# Patient Record
Sex: FEMALE | Race: WHITE | NOT HISPANIC OR LATINO | Employment: OTHER | URBAN - METROPOLITAN AREA
[De-identification: names, ages, dates, MRNs, and addresses within clinical notes are randomized per-mention and may not be internally consistent; named-entity substitution may affect disease eponyms.]

---

## 2017-01-05 ENCOUNTER — COMMUNICATION - HEALTHEAST (OUTPATIENT)
Dept: FAMILY MEDICINE | Facility: CLINIC | Age: 62
End: 2017-01-05

## 2017-01-23 ENCOUNTER — COMMUNICATION - HEALTHEAST (OUTPATIENT)
Dept: FAMILY MEDICINE | Facility: CLINIC | Age: 62
End: 2017-01-23

## 2017-01-23 DIAGNOSIS — F32.A DEPRESSION: ICD-10-CM

## 2017-03-01 ENCOUNTER — COMMUNICATION - HEALTHEAST (OUTPATIENT)
Dept: FAMILY MEDICINE | Facility: CLINIC | Age: 62
End: 2017-03-01

## 2017-03-01 DIAGNOSIS — F32.A DEPRESSION: ICD-10-CM

## 2017-03-13 ENCOUNTER — OFFICE VISIT - HEALTHEAST (OUTPATIENT)
Dept: FAMILY MEDICINE | Facility: CLINIC | Age: 62
End: 2017-03-13

## 2017-03-13 DIAGNOSIS — H02.59 LAXITY OF EYELID: ICD-10-CM

## 2017-03-13 DIAGNOSIS — E78.5 HYPERLIPIDEMIA: ICD-10-CM

## 2017-03-13 DIAGNOSIS — Z01.818 PREOP EXAMINATION: ICD-10-CM

## 2017-03-13 LAB
CHOLEST SERPL-MCNC: 293 MG/DL
FASTING STATUS PATIENT QL REPORTED: NO
HDLC SERPL-MCNC: 67 MG/DL
LDLC SERPL CALC-MCNC: 170 MG/DL
TRIGL SERPL-MCNC: 280 MG/DL

## 2017-03-13 ASSESSMENT — MIFFLIN-ST. JEOR: SCORE: 1174.11

## 2017-03-15 ENCOUNTER — COMMUNICATION - HEALTHEAST (OUTPATIENT)
Dept: FAMILY MEDICINE | Facility: CLINIC | Age: 62
End: 2017-03-15

## 2017-04-05 ENCOUNTER — COMMUNICATION - HEALTHEAST (OUTPATIENT)
Dept: FAMILY MEDICINE | Facility: CLINIC | Age: 62
End: 2017-04-05

## 2017-04-11 ENCOUNTER — COMMUNICATION - HEALTHEAST (OUTPATIENT)
Dept: FAMILY MEDICINE | Facility: CLINIC | Age: 62
End: 2017-04-11

## 2017-04-11 DIAGNOSIS — I10 ESSENTIAL HYPERTENSION, BENIGN: ICD-10-CM

## 2017-04-11 DIAGNOSIS — F32.A DEPRESSION: ICD-10-CM

## 2017-04-24 ENCOUNTER — OFFICE VISIT - HEALTHEAST (OUTPATIENT)
Dept: FAMILY MEDICINE | Facility: CLINIC | Age: 62
End: 2017-04-24

## 2017-04-24 DIAGNOSIS — G25.0 TREMOR, ESSENTIAL: ICD-10-CM

## 2017-04-24 DIAGNOSIS — E78.2 MIXED HYPERLIPIDEMIA: ICD-10-CM

## 2017-08-24 ENCOUNTER — HOSPITAL ENCOUNTER (OUTPATIENT)
Dept: MAMMOGRAPHY | Facility: HOSPITAL | Age: 62
Discharge: HOME | End: 2017-08-24
Attending: FAMILY MEDICINE

## 2017-08-24 DIAGNOSIS — Z12.31 VISIT FOR SCREENING MAMMOGRAM: ICD-10-CM

## 2017-09-12 ENCOUNTER — COMMUNICATION - HEALTHEAST (OUTPATIENT)
Dept: FAMILY MEDICINE | Facility: CLINIC | Age: 62
End: 2017-09-12

## 2017-09-12 DIAGNOSIS — I10 ESSENTIAL HYPERTENSION, BENIGN: ICD-10-CM

## 2017-09-29 ENCOUNTER — COMMUNICATION - HEALTHEAST (OUTPATIENT)
Dept: FAMILY MEDICINE | Facility: CLINIC | Age: 62
End: 2017-09-29

## 2017-09-29 DIAGNOSIS — F32.A DEPRESSION: ICD-10-CM

## 2017-10-02 ENCOUNTER — RECORDS - HEALTHEAST (OUTPATIENT)
Dept: ADMINISTRATIVE | Facility: OTHER | Age: 62
End: 2017-10-02

## 2017-10-09 ENCOUNTER — RECORDS - HEALTHEAST (OUTPATIENT)
Dept: ADMINISTRATIVE | Facility: OTHER | Age: 62
End: 2017-10-09

## 2017-11-09 ENCOUNTER — COMMUNICATION - HEALTHEAST (OUTPATIENT)
Dept: FAMILY MEDICINE | Facility: CLINIC | Age: 62
End: 2017-11-09

## 2017-11-09 DIAGNOSIS — K29.70 GASTRITIS: ICD-10-CM

## 2017-11-22 ENCOUNTER — COMMUNICATION - HEALTHEAST (OUTPATIENT)
Dept: FAMILY MEDICINE | Facility: CLINIC | Age: 62
End: 2017-11-22

## 2018-01-03 ENCOUNTER — OFFICE VISIT - HEALTHEAST (OUTPATIENT)
Dept: FAMILY MEDICINE | Facility: CLINIC | Age: 63
End: 2018-01-03

## 2018-01-03 DIAGNOSIS — I10 ESSENTIAL HYPERTENSION, BENIGN: ICD-10-CM

## 2018-01-03 DIAGNOSIS — E78.5 HYPERLIPIDEMIA: ICD-10-CM

## 2018-01-03 LAB
ALBUMIN SERPL-MCNC: 3.9 G/DL (ref 3.5–5)
ALP SERPL-CCNC: 89 U/L (ref 45–120)
ALT SERPL W P-5'-P-CCNC: 44 U/L (ref 0–45)
ANION GAP SERPL CALCULATED.3IONS-SCNC: 11 MMOL/L (ref 5–18)
AST SERPL W P-5'-P-CCNC: 36 U/L (ref 0–40)
BILIRUB SERPL-MCNC: 0.6 MG/DL (ref 0–1)
BUN SERPL-MCNC: 16 MG/DL (ref 8–22)
CALCIUM SERPL-MCNC: 9.4 MG/DL (ref 8.5–10.5)
CHLORIDE BLD-SCNC: 103 MMOL/L (ref 98–107)
CHOLEST SERPL-MCNC: 213 MG/DL
CO2 SERPL-SCNC: 25 MMOL/L (ref 22–31)
CREAT SERPL-MCNC: 0.71 MG/DL (ref 0.6–1.1)
FASTING STATUS PATIENT QL REPORTED: YES
GFR SERPL CREATININE-BSD FRML MDRD: >60 ML/MIN/1.73M2
GLUCOSE BLD-MCNC: 106 MG/DL (ref 70–125)
HDLC SERPL-MCNC: 82 MG/DL
LDLC SERPL CALC-MCNC: 102 MG/DL
POTASSIUM BLD-SCNC: 4.6 MMOL/L (ref 3.5–5)
PROT SERPL-MCNC: 6.7 G/DL (ref 6–8)
SODIUM SERPL-SCNC: 139 MMOL/L (ref 136–145)
TRIGL SERPL-MCNC: 146 MG/DL

## 2018-01-03 ASSESSMENT — MIFFLIN-ST. JEOR: SCORE: 1142.36

## 2018-01-09 ENCOUNTER — COMMUNICATION - HEALTHEAST (OUTPATIENT)
Dept: FAMILY MEDICINE | Facility: CLINIC | Age: 63
End: 2018-01-09

## 2018-02-28 ENCOUNTER — OFFICE VISIT - HEALTHEAST (OUTPATIENT)
Dept: FAMILY MEDICINE | Facility: CLINIC | Age: 63
End: 2018-02-28

## 2018-02-28 DIAGNOSIS — E78.2 MIXED HYPERLIPIDEMIA: ICD-10-CM

## 2018-02-28 DIAGNOSIS — Z01.818 PREOP EXAMINATION: ICD-10-CM

## 2018-02-28 DIAGNOSIS — I10 ESSENTIAL HYPERTENSION, BENIGN: ICD-10-CM

## 2018-02-28 DIAGNOSIS — E66.9 OBESITY: ICD-10-CM

## 2018-02-28 LAB
ANION GAP SERPL CALCULATED.3IONS-SCNC: 9 MMOL/L (ref 5–18)
ATRIAL RATE - MUSE: 53 BPM
BUN SERPL-MCNC: 17 MG/DL (ref 8–22)
CALCIUM SERPL-MCNC: 9.5 MG/DL (ref 8.5–10.5)
CHLORIDE BLD-SCNC: 103 MMOL/L (ref 98–107)
CO2 SERPL-SCNC: 27 MMOL/L (ref 22–31)
CREAT SERPL-MCNC: 0.7 MG/DL (ref 0.6–1.1)
DIASTOLIC BLOOD PRESSURE - MUSE: NORMAL MMHG
ERYTHROCYTE [DISTWIDTH] IN BLOOD BY AUTOMATED COUNT: 12.5 % (ref 11–14.5)
GFR SERPL CREATININE-BSD FRML MDRD: >60 ML/MIN/1.73M2
GLUCOSE BLD-MCNC: 97 MG/DL (ref 70–125)
HCT VFR BLD AUTO: 40 % (ref 35–47)
HGB BLD-MCNC: 13.4 G/DL (ref 12–16)
INTERPRETATION ECG - MUSE: NORMAL
MCH RBC QN AUTO: 29.6 PG (ref 27–34)
MCHC RBC AUTO-ENTMCNC: 33.5 G/DL (ref 32–36)
MCV RBC AUTO: 88 FL (ref 80–100)
P AXIS - MUSE: 46 DEGREES
PLATELET # BLD AUTO: 303 THOU/UL (ref 140–440)
PMV BLD AUTO: 7.3 FL (ref 7–10)
POTASSIUM BLD-SCNC: 4.2 MMOL/L (ref 3.5–5)
PR INTERVAL - MUSE: 186 MS
QRS DURATION - MUSE: 94 MS
QT - MUSE: 494 MS
QTC - MUSE: 463 MS
R AXIS - MUSE: 12 DEGREES
RBC # BLD AUTO: 4.53 MILL/UL (ref 3.8–5.4)
SODIUM SERPL-SCNC: 139 MMOL/L (ref 136–145)
SYSTOLIC BLOOD PRESSURE - MUSE: NORMAL MMHG
T AXIS - MUSE: 6 DEGREES
VENTRICULAR RATE- MUSE: 53 BPM
WBC: 6.1 THOU/UL (ref 4–11)

## 2018-02-28 ASSESSMENT — MIFFLIN-ST. JEOR: SCORE: 1137.82

## 2018-05-08 ENCOUNTER — COMMUNICATION - HEALTHEAST (OUTPATIENT)
Dept: FAMILY MEDICINE | Facility: CLINIC | Age: 63
End: 2018-05-08

## 2018-05-08 DIAGNOSIS — K29.70 GASTRITIS: ICD-10-CM

## 2018-06-20 ENCOUNTER — OFFICE VISIT - HEALTHEAST (OUTPATIENT)
Dept: FAMILY MEDICINE | Facility: CLINIC | Age: 63
End: 2018-06-20

## 2018-06-20 DIAGNOSIS — R53.82 CHRONIC FATIGUE: ICD-10-CM

## 2018-06-20 LAB
ALBUMIN SERPL-MCNC: 4 G/DL (ref 3.5–5)
ALP SERPL-CCNC: 77 U/L (ref 45–120)
ALT SERPL W P-5'-P-CCNC: 45 U/L (ref 0–45)
ANION GAP SERPL CALCULATED.3IONS-SCNC: 14 MMOL/L (ref 5–18)
AST SERPL W P-5'-P-CCNC: 43 U/L (ref 0–40)
BILIRUB SERPL-MCNC: 0.3 MG/DL (ref 0–1)
BUN SERPL-MCNC: 15 MG/DL (ref 8–22)
CALCIUM SERPL-MCNC: 10.3 MG/DL (ref 8.5–10.5)
CHLORIDE BLD-SCNC: 106 MMOL/L (ref 98–107)
CO2 SERPL-SCNC: 22 MMOL/L (ref 22–31)
CREAT SERPL-MCNC: 0.73 MG/DL (ref 0.6–1.1)
ERYTHROCYTE [DISTWIDTH] IN BLOOD BY AUTOMATED COUNT: 11.2 % (ref 11–14.5)
GFR SERPL CREATININE-BSD FRML MDRD: >60 ML/MIN/1.73M2
GLUCOSE BLD-MCNC: 124 MG/DL (ref 70–125)
HCT VFR BLD AUTO: 42.3 % (ref 35–47)
HGB BLD-MCNC: 13.9 G/DL (ref 12–16)
MCH RBC QN AUTO: 29.4 PG (ref 27–34)
MCHC RBC AUTO-ENTMCNC: 32.9 G/DL (ref 32–36)
MCV RBC AUTO: 89 FL (ref 80–100)
PLATELET # BLD AUTO: 279 THOU/UL (ref 140–440)
PMV BLD AUTO: 7.4 FL (ref 7–10)
POTASSIUM BLD-SCNC: 4.2 MMOL/L (ref 3.5–5)
PROT SERPL-MCNC: 6.7 G/DL (ref 6–8)
RBC # BLD AUTO: 4.73 MILL/UL (ref 3.8–5.4)
SODIUM SERPL-SCNC: 142 MMOL/L (ref 136–145)
TSH SERPL DL<=0.005 MIU/L-ACNC: 1.45 UIU/ML (ref 0.3–5)
VIT B12 SERPL-MCNC: 628 PG/ML (ref 213–816)
WBC: 5.8 THOU/UL (ref 4–11)

## 2018-06-20 ASSESSMENT — MIFFLIN-ST. JEOR: SCORE: 1128.75

## 2018-06-21 LAB — 25(OH)D3 SERPL-MCNC: 29.7 NG/ML (ref 30–80)

## 2018-06-25 ENCOUNTER — COMMUNICATION - HEALTHEAST (OUTPATIENT)
Dept: FAMILY MEDICINE | Facility: CLINIC | Age: 63
End: 2018-06-25

## 2018-07-11 ENCOUNTER — HOSPITAL ENCOUNTER (OUTPATIENT)
Dept: MAMMOGRAPHY | Facility: CLINIC | Age: 63
Discharge: HOME | End: 2018-07-11
Attending: FAMILY MEDICINE

## 2018-07-11 DIAGNOSIS — Z12.31 SCREENING MAMMOGRAM, ENCOUNTER FOR: ICD-10-CM

## 2018-07-20 ENCOUNTER — RECORDS - HEALTHEAST (OUTPATIENT)
Dept: ADMINISTRATIVE | Facility: OTHER | Age: 63
End: 2018-07-20

## 2018-08-10 ENCOUNTER — COMMUNICATION - HEALTHEAST (OUTPATIENT)
Dept: FAMILY MEDICINE | Facility: CLINIC | Age: 63
End: 2018-08-10

## 2018-08-10 DIAGNOSIS — I10 ESSENTIAL HYPERTENSION, BENIGN: ICD-10-CM

## 2018-10-30 ENCOUNTER — MEDICAL CORRESPONDENCE (OUTPATIENT)
Dept: HEALTH INFORMATION MANAGEMENT | Facility: CLINIC | Age: 63
End: 2018-10-30

## 2018-10-30 ENCOUNTER — TRANSFERRED RECORDS (OUTPATIENT)
Dept: HEALTH INFORMATION MANAGEMENT | Facility: CLINIC | Age: 63
End: 2018-10-30

## 2018-10-30 ENCOUNTER — OFFICE VISIT - HEALTHEAST (OUTPATIENT)
Dept: FAMILY MEDICINE | Facility: CLINIC | Age: 63
End: 2018-10-30

## 2018-10-30 DIAGNOSIS — M19.90 ARTHRITIS: ICD-10-CM

## 2018-10-30 LAB
CHOLEST SERPL-MCNC: 195 MG/DL
CHOLEST SERPL-MCNC: 195 MG/DL
ERYTHROCYTE [SEDIMENTATION RATE] IN BLOOD BY WESTERGREN METHOD: 6 MM/HR (ref 0–20)
FASTING STATUS PATIENT QL REPORTED: NO
HDLC SERPL-MCNC: 67 MG/DL
HDLC SERPL-MCNC: 67 MG/DL
LDLC SERPL CALC-MCNC: 76 MG/DL
LDLC SERPL CALC-MCNC: 76 MG/DL
RHEUMATOID FACT SERPL-ACNC: <15 IU/ML (ref 0–30)
TRIGL SERPL-MCNC: 262 MG/DL
TRIGL SERPL-MCNC: 262 MG/DL

## 2018-10-30 ASSESSMENT — MIFFLIN-ST. JEOR: SCORE: 1124.22

## 2018-10-31 ENCOUNTER — TRANSFERRED RECORDS (OUTPATIENT)
Dept: HEALTH INFORMATION MANAGEMENT | Facility: CLINIC | Age: 63
End: 2018-10-31

## 2018-10-31 LAB — B BURGDOR IGG+IGM SER QL: 0.07 INDEX VALUE

## 2018-11-01 LAB — ANA SER QL: 0.4 U

## 2018-11-02 ENCOUNTER — TRANSFERRED RECORDS (OUTPATIENT)
Dept: HEALTH INFORMATION MANAGEMENT | Facility: CLINIC | Age: 63
End: 2018-11-02

## 2018-11-04 ENCOUNTER — COMMUNICATION - HEALTHEAST (OUTPATIENT)
Dept: FAMILY MEDICINE | Facility: CLINIC | Age: 63
End: 2018-11-04

## 2018-11-04 DIAGNOSIS — K29.70 GASTRITIS: ICD-10-CM

## 2018-11-06 ENCOUNTER — COMMUNICATION - HEALTHEAST (OUTPATIENT)
Dept: FAMILY MEDICINE | Facility: CLINIC | Age: 63
End: 2018-11-06

## 2019-01-01 ENCOUNTER — COMMUNICATION - HEALTHEAST (OUTPATIENT)
Dept: FAMILY MEDICINE | Facility: CLINIC | Age: 64
End: 2019-01-01

## 2019-01-01 DIAGNOSIS — F32.A DEPRESSION: ICD-10-CM

## 2019-01-25 ENCOUNTER — COMMUNICATION - HEALTHEAST (OUTPATIENT)
Dept: FAMILY MEDICINE | Facility: CLINIC | Age: 64
End: 2019-01-25

## 2019-01-28 ENCOUNTER — COMMUNICATION - HEALTHEAST (OUTPATIENT)
Dept: FAMILY MEDICINE | Facility: CLINIC | Age: 64
End: 2019-01-28

## 2019-01-28 DIAGNOSIS — E78.2 MIXED HYPERLIPIDEMIA: ICD-10-CM

## 2019-02-07 ENCOUNTER — OFFICE VISIT - HEALTHEAST (OUTPATIENT)
Dept: FAMILY MEDICINE | Facility: CLINIC | Age: 64
End: 2019-02-07

## 2019-02-07 DIAGNOSIS — F32.1 CURRENT MODERATE EPISODE OF MAJOR DEPRESSIVE DISORDER WITHOUT PRIOR EPISODE (H): ICD-10-CM

## 2019-02-07 DIAGNOSIS — F41.9 ANXIETY: ICD-10-CM

## 2019-02-07 DIAGNOSIS — I10 ESSENTIAL HYPERTENSION, BENIGN: ICD-10-CM

## 2019-02-07 DIAGNOSIS — E78.2 MIXED HYPERLIPIDEMIA: ICD-10-CM

## 2019-02-07 DIAGNOSIS — G25.0 BENIGN FAMILIAL TREMOR: ICD-10-CM

## 2019-02-07 DIAGNOSIS — Z01.818 PRE-OP EXAM: ICD-10-CM

## 2019-02-07 ASSESSMENT — MIFFLIN-ST. JEOR: SCORE: 1136.69

## 2019-02-08 ENCOUNTER — COMMUNICATION - HEALTHEAST (OUTPATIENT)
Dept: FAMILY MEDICINE | Facility: CLINIC | Age: 64
End: 2019-02-08

## 2019-04-22 ENCOUNTER — COMMUNICATION - HEALTHEAST (OUTPATIENT)
Dept: FAMILY MEDICINE | Facility: CLINIC | Age: 64
End: 2019-04-22

## 2019-04-22 DIAGNOSIS — G25.0 BENIGN FAMILIAL TREMOR: ICD-10-CM

## 2019-05-21 ENCOUNTER — COMMUNICATION - HEALTHEAST (OUTPATIENT)
Dept: FAMILY MEDICINE | Facility: CLINIC | Age: 64
End: 2019-05-21

## 2019-05-21 DIAGNOSIS — I10 ESSENTIAL HYPERTENSION, BENIGN: ICD-10-CM

## 2019-05-21 DIAGNOSIS — E78.2 MIXED HYPERLIPIDEMIA: ICD-10-CM

## 2019-08-22 ENCOUNTER — COMMUNICATION - HEALTHEAST (OUTPATIENT)
Dept: FAMILY MEDICINE | Facility: CLINIC | Age: 64
End: 2019-08-22

## 2019-08-22 DIAGNOSIS — F32.A DEPRESSION: ICD-10-CM

## 2019-10-02 ENCOUNTER — OFFICE VISIT - HEALTHEAST (OUTPATIENT)
Dept: FAMILY MEDICINE | Facility: CLINIC | Age: 64
End: 2019-10-02

## 2019-10-02 DIAGNOSIS — Z12.11 SCREEN FOR COLON CANCER: ICD-10-CM

## 2019-10-02 DIAGNOSIS — I10 ESSENTIAL HYPERTENSION, BENIGN: ICD-10-CM

## 2019-10-02 DIAGNOSIS — F32.1 CURRENT MODERATE EPISODE OF MAJOR DEPRESSIVE DISORDER WITHOUT PRIOR EPISODE (H): ICD-10-CM

## 2019-10-02 DIAGNOSIS — Z79.899 ENCOUNTER FOR MONITORING ACE-INHIBITOR THERAPY: ICD-10-CM

## 2019-10-02 DIAGNOSIS — Z01.818 PRE-OP EXAM: ICD-10-CM

## 2019-10-02 DIAGNOSIS — Z51.81 ENCOUNTER FOR MONITORING ACE-INHIBITOR THERAPY: ICD-10-CM

## 2019-10-02 DIAGNOSIS — Z23 NEED FOR IMMUNIZATION AGAINST INFLUENZA: ICD-10-CM

## 2019-10-02 DIAGNOSIS — F41.9 ANXIETY: ICD-10-CM

## 2019-10-02 DIAGNOSIS — R53.82 CHRONIC FATIGUE: ICD-10-CM

## 2019-10-02 LAB
ANION GAP SERPL CALCULATED.3IONS-SCNC: 10 MMOL/L (ref 5–18)
BUN SERPL-MCNC: 21 MG/DL (ref 8–22)
CALCIUM SERPL-MCNC: 9.3 MG/DL (ref 8.5–10.5)
CHLORIDE BLD-SCNC: 108 MMOL/L (ref 98–107)
CO2 SERPL-SCNC: 22 MMOL/L (ref 22–31)
CREAT SERPL-MCNC: 0.71 MG/DL (ref 0.6–1.1)
ERYTHROCYTE [DISTWIDTH] IN BLOOD BY AUTOMATED COUNT: 10.9 % (ref 11–14.5)
GFR SERPL CREATININE-BSD FRML MDRD: >60 ML/MIN/1.73M2
GLUCOSE BLD-MCNC: 104 MG/DL (ref 70–125)
HCT VFR BLD AUTO: 39.2 % (ref 35–47)
HGB BLD-MCNC: 13 G/DL (ref 12–16)
MCH RBC QN AUTO: 30.3 PG (ref 27–34)
MCHC RBC AUTO-ENTMCNC: 33.3 G/DL (ref 32–36)
MCV RBC AUTO: 91 FL (ref 80–100)
PLATELET # BLD AUTO: 266 THOU/UL (ref 140–440)
PMV BLD AUTO: 7.7 FL (ref 7–10)
POTASSIUM BLD-SCNC: 4.5 MMOL/L (ref 3.5–5)
RBC # BLD AUTO: 4.29 MILL/UL (ref 3.8–5.4)
SODIUM SERPL-SCNC: 140 MMOL/L (ref 136–145)
TSH SERPL DL<=0.005 MIU/L-ACNC: 0.85 UIU/ML (ref 0.3–5)
WBC: 5 THOU/UL (ref 4–11)

## 2019-10-02 ASSESSMENT — MIFFLIN-ST. JEOR: SCORE: 1140.09

## 2019-10-02 ASSESSMENT — PATIENT HEALTH QUESTIONNAIRE - PHQ9: SUM OF ALL RESPONSES TO PHQ QUESTIONS 1-9: 3

## 2019-10-14 ENCOUNTER — COMMUNICATION - HEALTHEAST (OUTPATIENT)
Dept: FAMILY MEDICINE | Facility: CLINIC | Age: 64
End: 2019-10-14

## 2019-10-14 DIAGNOSIS — I10 ESSENTIAL HYPERTENSION, BENIGN: ICD-10-CM

## 2019-10-23 ENCOUNTER — RECORDS - HEALTHEAST (OUTPATIENT)
Dept: ADMINISTRATIVE | Facility: OTHER | Age: 64
End: 2019-10-23

## 2019-10-23 ENCOUNTER — HOSPITAL ENCOUNTER (OUTPATIENT)
Dept: MAMMOGRAPHY | Facility: CLINIC | Age: 64
Discharge: HOME | End: 2019-10-23
Attending: FAMILY MEDICINE

## 2019-10-23 DIAGNOSIS — Z12.31 VISIT FOR SCREENING MAMMOGRAM: ICD-10-CM

## 2019-11-03 ENCOUNTER — COMMUNICATION - HEALTHEAST (OUTPATIENT)
Dept: FAMILY MEDICINE | Facility: CLINIC | Age: 64
End: 2019-11-03

## 2019-11-03 DIAGNOSIS — K29.70 GASTRITIS: ICD-10-CM

## 2019-12-04 ENCOUNTER — COMMUNICATION - HEALTHEAST (OUTPATIENT)
Dept: FAMILY MEDICINE | Facility: CLINIC | Age: 64
End: 2019-12-04

## 2019-12-04 DIAGNOSIS — I10 ESSENTIAL HYPERTENSION, BENIGN: ICD-10-CM

## 2019-12-12 ENCOUNTER — OFFICE VISIT - HEALTHEAST (OUTPATIENT)
Dept: FAMILY MEDICINE | Facility: CLINIC | Age: 64
End: 2019-12-12

## 2019-12-12 DIAGNOSIS — Z82.49 FAMILY HISTORY OF ISCHEMIC HEART DISEASE: ICD-10-CM

## 2019-12-12 DIAGNOSIS — R23.2 FLUSHING: ICD-10-CM

## 2019-12-12 DIAGNOSIS — R00.2 PALPITATIONS: ICD-10-CM

## 2019-12-12 DIAGNOSIS — I10 ESSENTIAL HYPERTENSION, BENIGN: ICD-10-CM

## 2019-12-12 ASSESSMENT — MIFFLIN-ST. JEOR: SCORE: 1143.49

## 2020-01-03 ENCOUNTER — HOSPITAL ENCOUNTER (OUTPATIENT)
Dept: CARDIOLOGY | Facility: HOSPITAL | Age: 65
Discharge: HOME | End: 2020-01-03
Attending: FAMILY MEDICINE

## 2020-01-03 DIAGNOSIS — R00.2 PALPITATIONS: ICD-10-CM

## 2020-01-03 LAB
CV STRESS CURRENT BP HE: NORMAL
CV STRESS CURRENT HR HE: 104
CV STRESS CURRENT HR HE: 108
CV STRESS CURRENT HR HE: 109
CV STRESS CURRENT HR HE: 110
CV STRESS CURRENT HR HE: 112
CV STRESS CURRENT HR HE: 124
CV STRESS CURRENT HR HE: 125
CV STRESS CURRENT HR HE: 132
CV STRESS CURRENT HR HE: 133
CV STRESS CURRENT HR HE: 133
CV STRESS CURRENT HR HE: 134
CV STRESS CURRENT HR HE: 135
CV STRESS CURRENT HR HE: 149
CV STRESS CURRENT HR HE: 153
CV STRESS CURRENT HR HE: 71
CV STRESS CURRENT HR HE: 72
CV STRESS CURRENT HR HE: 75
CV STRESS CURRENT HR HE: 79
CV STRESS CURRENT HR HE: 85
CV STRESS CURRENT HR HE: 88
CV STRESS CURRENT HR HE: 90
CV STRESS CURRENT HR HE: 97
CV STRESS DEVIATION TIME HE: NORMAL
CV STRESS ECHO PERCENT HR HE: NORMAL
CV STRESS EXERCISE STAGE HE: NORMAL
CV STRESS FINAL RESTING BP HE: NORMAL
CV STRESS FINAL RESTING HR HE: 75
CV STRESS MAX HR HE: 155
CV STRESS MAX TREADMILL GRADE HE: 14
CV STRESS MAX TREADMILL SPEED HE: 3.4
CV STRESS PEAK DIA BP HE: NORMAL
CV STRESS PEAK SYS BP HE: NORMAL
CV STRESS PHASE HE: NORMAL
CV STRESS PROTOCOL HE: NORMAL
CV STRESS RESTING PT POSITION HE: NORMAL
CV STRESS RESTING PT POSITION HE: NORMAL
CV STRESS ST DEVIATION AMOUNT HE: NORMAL
CV STRESS ST DEVIATION ELEVATION HE: NORMAL
CV STRESS ST EVELATION AMOUNT HE: NORMAL
CV STRESS TEST TYPE HE: NORMAL
CV STRESS TOTAL STAGE TIME MIN 1 HE: NORMAL
ECHO EJECTION FRACTION ESTIMATED: 55 %
RATE PRESSURE PRODUCT: NORMAL
STRESS ECHO BASELINE DIASTOLIC HE: 80
STRESS ECHO BASELINE HR: 72
STRESS ECHO BASELINE SYSTOLIC BP: 140
STRESS ECHO CALCULATED PERCENT HR: 99 %
STRESS ECHO LAST STRESS DIASTOLIC BP: 80
STRESS ECHO LAST STRESS HR: 153
STRESS ECHO LAST STRESS SYSTOLIC BP: 170
STRESS ECHO POST ESTIMATED WORKLOAD: 8.7
STRESS ECHO POST EXERCISE DUR MIN: 7
STRESS ECHO POST EXERCISE DUR SEC: 14
STRESS ECHO TARGET HR: 157

## 2020-01-06 ENCOUNTER — COMMUNICATION - HEALTHEAST (OUTPATIENT)
Dept: FAMILY MEDICINE | Facility: CLINIC | Age: 65
End: 2020-01-06

## 2020-01-15 ENCOUNTER — RECORDS - HEALTHEAST (OUTPATIENT)
Dept: ADMINISTRATIVE | Facility: OTHER | Age: 65
End: 2020-01-15

## 2020-01-17 ENCOUNTER — RECORDS - HEALTHEAST (OUTPATIENT)
Dept: ADMINISTRATIVE | Facility: OTHER | Age: 65
End: 2020-01-17

## 2020-02-03 ENCOUNTER — COMMUNICATION - HEALTHEAST (OUTPATIENT)
Dept: FAMILY MEDICINE | Facility: CLINIC | Age: 65
End: 2020-02-03

## 2020-02-03 DIAGNOSIS — E78.2 MIXED HYPERLIPIDEMIA: ICD-10-CM

## 2020-05-18 ENCOUNTER — OFFICE VISIT - HEALTHEAST (OUTPATIENT)
Dept: FAMILY MEDICINE | Facility: CLINIC | Age: 65
End: 2020-05-18

## 2020-05-18 DIAGNOSIS — I10 ESSENTIAL HYPERTENSION: ICD-10-CM

## 2020-05-18 DIAGNOSIS — M79.89 ARM SWELLING: ICD-10-CM

## 2020-05-22 ENCOUNTER — COMMUNICATION - HEALTHEAST (OUTPATIENT)
Dept: FAMILY MEDICINE | Facility: CLINIC | Age: 65
End: 2020-05-22

## 2020-05-27 ENCOUNTER — OFFICE VISIT - HEALTHEAST (OUTPATIENT)
Dept: FAMILY MEDICINE | Facility: CLINIC | Age: 65
End: 2020-05-27

## 2020-05-27 DIAGNOSIS — I10 ESSENTIAL HYPERTENSION, BENIGN: ICD-10-CM

## 2020-05-27 DIAGNOSIS — K21.9 GASTROESOPHAGEAL REFLUX DISEASE, ESOPHAGITIS PRESENCE NOT SPECIFIED: ICD-10-CM

## 2020-06-12 ENCOUNTER — OFFICE VISIT - HEALTHEAST (OUTPATIENT)
Dept: FAMILY MEDICINE | Facility: CLINIC | Age: 65
End: 2020-06-12

## 2020-06-12 DIAGNOSIS — R13.10 SWALLOWING DYSFUNCTION: ICD-10-CM

## 2020-06-16 ENCOUNTER — RECORDS - HEALTHEAST (OUTPATIENT)
Dept: ADMINISTRATIVE | Facility: OTHER | Age: 65
End: 2020-06-16

## 2020-06-22 ENCOUNTER — HOSPITAL ENCOUNTER (OUTPATIENT)
Dept: RADIOLOGY | Facility: HOSPITAL | Age: 65
Discharge: HOME | End: 2020-06-22
Attending: OTOLARYNGOLOGY

## 2020-06-22 ENCOUNTER — COMMUNICATION - HEALTHEAST (OUTPATIENT)
Dept: FAMILY MEDICINE | Facility: CLINIC | Age: 65
End: 2020-06-22

## 2020-06-22 DIAGNOSIS — K21.9 ESOPHAGEAL REFLUX: ICD-10-CM

## 2020-06-22 DIAGNOSIS — R13.10 DYSPHAGIA, UNSPECIFIED TYPE: ICD-10-CM

## 2020-06-22 DIAGNOSIS — R09.A2 FEELING OF FOREIGN BODY IN THROAT: ICD-10-CM

## 2020-06-22 DIAGNOSIS — R07.0 PAIN IN THROAT: ICD-10-CM

## 2020-06-22 DIAGNOSIS — I10 ESSENTIAL HYPERTENSION: ICD-10-CM

## 2020-07-16 ENCOUNTER — RECORDS - HEALTHEAST (OUTPATIENT)
Dept: ADMINISTRATIVE | Facility: OTHER | Age: 65
End: 2020-07-16

## 2020-07-24 ENCOUNTER — RECORDS - HEALTHEAST (OUTPATIENT)
Dept: ADMINISTRATIVE | Facility: OTHER | Age: 65
End: 2020-07-24

## 2020-08-14 ENCOUNTER — RECORDS - HEALTHEAST (OUTPATIENT)
Dept: ADMINISTRATIVE | Facility: OTHER | Age: 65
End: 2020-08-14

## 2020-08-26 ENCOUNTER — COMMUNICATION - HEALTHEAST (OUTPATIENT)
Dept: FAMILY MEDICINE | Facility: CLINIC | Age: 65
End: 2020-08-26

## 2020-08-26 DIAGNOSIS — F32.1 CURRENT MODERATE EPISODE OF MAJOR DEPRESSIVE DISORDER WITHOUT PRIOR EPISODE (H): ICD-10-CM

## 2020-09-03 ENCOUNTER — OFFICE VISIT - HEALTHEAST (OUTPATIENT)
Dept: INTERNAL MEDICINE | Facility: CLINIC | Age: 65
End: 2020-09-03

## 2020-09-03 DIAGNOSIS — E78.2 MIXED HYPERLIPIDEMIA: ICD-10-CM

## 2020-09-03 DIAGNOSIS — Z00.00 HEALTHCARE MAINTENANCE: ICD-10-CM

## 2020-09-03 DIAGNOSIS — Z00.00 HEALTH CARE MAINTENANCE: ICD-10-CM

## 2020-09-03 DIAGNOSIS — F41.9 ANXIETY: ICD-10-CM

## 2020-09-03 DIAGNOSIS — G25.0 BENIGN FAMILIAL TREMOR: ICD-10-CM

## 2020-09-03 DIAGNOSIS — M54.12 BRACHIAL NEURITIS OR RADICULITIS: ICD-10-CM

## 2020-09-03 DIAGNOSIS — I10 ESSENTIAL HYPERTENSION, BENIGN: ICD-10-CM

## 2020-09-03 DIAGNOSIS — E04.1 THYROID NODULE: ICD-10-CM

## 2020-09-03 DIAGNOSIS — Z00.00 ROUTINE HISTORY AND PHYSICAL EXAMINATION OF ADULT: ICD-10-CM

## 2020-09-03 DIAGNOSIS — H81.10 BENIGN PAROXYSMAL POSITIONAL VERTIGO, UNSPECIFIED LATERALITY: ICD-10-CM

## 2020-09-03 LAB
ALBUMIN SERPL-MCNC: 4.4 G/DL (ref 3.5–5)
ALP SERPL-CCNC: 74 U/L (ref 45–120)
ALT SERPL W P-5'-P-CCNC: 41 U/L (ref 0–45)
ANION GAP SERPL CALCULATED.3IONS-SCNC: 14 MMOL/L (ref 5–18)
AST SERPL W P-5'-P-CCNC: 34 U/L (ref 0–40)
BILIRUB SERPL-MCNC: 0.4 MG/DL (ref 0–1)
BUN SERPL-MCNC: 19 MG/DL (ref 8–22)
CALCIUM SERPL-MCNC: 9.8 MG/DL (ref 8.5–10.5)
CHLORIDE BLD-SCNC: 103 MMOL/L (ref 98–107)
CHOLEST SERPL-MCNC: 239 MG/DL
CO2 SERPL-SCNC: 23 MMOL/L (ref 22–31)
CREAT SERPL-MCNC: 0.72 MG/DL (ref 0.6–1.1)
FASTING STATUS PATIENT QL REPORTED: NO
GFR SERPL CREATININE-BSD FRML MDRD: >60 ML/MIN/1.73M2
GLUCOSE BLD-MCNC: 92 MG/DL (ref 70–125)
HDLC SERPL-MCNC: 70 MG/DL
LDLC SERPL CALC-MCNC: 106 MG/DL
POTASSIUM BLD-SCNC: 4.8 MMOL/L (ref 3.5–5)
PROT SERPL-MCNC: 7.1 G/DL (ref 6–8)
SODIUM SERPL-SCNC: 140 MMOL/L (ref 136–145)
TRIGL SERPL-MCNC: 316 MG/DL
TSH SERPL DL<=0.005 MIU/L-ACNC: 1.13 UIU/ML (ref 0.3–5)

## 2020-09-03 RX ORDER — OMEPRAZOLE 40 MG/1
40 CAPSULE, DELAYED RELEASE ORAL
Status: SHIPPED | COMMUNITY
Start: 2020-09-03

## 2020-09-03 RX ORDER — PRIMIDONE 50 MG/1
50 TABLET ORAL AT BEDTIME
Qty: 90 TABLET | Refills: 3 | Status: SHIPPED | OUTPATIENT
Start: 2020-09-03 | End: 2021-10-15

## 2020-09-03 ASSESSMENT — MIFFLIN-ST. JEOR: SCORE: 1124.22

## 2020-09-04 ENCOUNTER — COMMUNICATION - HEALTHEAST (OUTPATIENT)
Dept: INTERNAL MEDICINE | Facility: CLINIC | Age: 65
End: 2020-09-04

## 2020-09-04 DIAGNOSIS — F41.9 ANXIETY: ICD-10-CM

## 2020-09-04 LAB
25(OH)D3 SERPL-MCNC: 32.9 NG/ML (ref 30–80)
25(OH)D3 SERPL-MCNC: 32.9 NG/ML (ref 30–80)

## 2020-09-04 RX ORDER — ALPRAZOLAM 0.25 MG
TABLET ORAL
Qty: 5 TABLET | Refills: 1 | Status: SHIPPED | OUTPATIENT
Start: 2020-09-04 | End: 2022-10-26

## 2020-09-07 ENCOUNTER — APPOINTMENT (OUTPATIENT)
Dept: GENERAL RADIOLOGY | Facility: CLINIC | Age: 65
End: 2020-09-07
Attending: PHYSICIAN ASSISTANT
Payer: COMMERCIAL

## 2020-09-07 ENCOUNTER — RECORDS - HEALTHEAST (OUTPATIENT)
Dept: ADMINISTRATIVE | Facility: OTHER | Age: 65
End: 2020-09-07

## 2020-09-07 ENCOUNTER — HOSPITAL ENCOUNTER (EMERGENCY)
Facility: CLINIC | Age: 65
Discharge: HOME | End: 2020-09-07
Attending: PHYSICIAN ASSISTANT | Admitting: PHYSICIAN ASSISTANT
Payer: COMMERCIAL

## 2020-09-07 VITALS
TEMPERATURE: 98 F | OXYGEN SATURATION: 99 % | SYSTOLIC BLOOD PRESSURE: 134 MMHG | HEART RATE: 71 BPM | DIASTOLIC BLOOD PRESSURE: 82 MMHG | RESPIRATION RATE: 16 BRPM | WEIGHT: 145 LBS

## 2020-09-07 DIAGNOSIS — M25.571 PAIN IN JOINT INVOLVING ANKLE AND FOOT, RIGHT: ICD-10-CM

## 2020-09-07 PROCEDURE — G0463 HOSPITAL OUTPT CLINIC VISIT: HCPCS | Performed by: PHYSICIAN ASSISTANT

## 2020-09-07 PROCEDURE — 73610 X-RAY EXAM OF ANKLE: CPT | Mod: RT

## 2020-09-07 PROCEDURE — 99213 OFFICE O/P EST LOW 20 MIN: CPT | Mod: Z6 | Performed by: PHYSICIAN ASSISTANT

## 2020-09-07 PROCEDURE — 73630 X-RAY EXAM OF FOOT: CPT | Mod: RT

## 2020-09-07 ASSESSMENT — ENCOUNTER SYMPTOMS: CONSTITUTIONAL NEGATIVE: 1

## 2020-09-07 NOTE — ED AVS SNAPSHOT
[facility] Emergency Department  [address] [address]  Phone:  [phone]  Fax:  [phone]                                    Lakisha Cooper   MRN: [id number]    Department:  [facility] Emergency Department   Date of Visit:  9/7/2020           After Visit Summary Signature Page    I have received my discharge instructions, and my questions have been answered. I have discussed any challenges I see with this plan with the nurse or doctor.    ..........................................................................................................................................  Patient/Patient Representative Signature      ..........................................................................................................................................  Patient Representative Print Name and Relationship to Patient    ..................................................               ................................................  Date                                   Time    ..........................................................................................................................................  Reviewed by Signature/Title    ...................................................              ..............................................  Date                                               Time          22EPIC Rev 08/18

## 2020-09-07 NOTE — ED PROVIDER NOTES
History     Chief Complaint   Patient presents with     Ankle Pain     right ankle misstepped on stairs. Painful.      HPI  Lakisha Cooper is a 65 year old female who presents with complaints of right foot and ankle pain since she misstepped on the stairs last night.  Patient describes persistent pain throughout her anterior foot and ankle since that time that is worse with weightbearing.  She has not noticed any overlying skin changes of the area nor any swelling.      Allergies:  No Known Allergies    Problem List:    There are no active problems to display for this patient.       Past Medical History:    No past medical history on file.    Past Surgical History:    No past surgical history on file.    Family History:    No family history on file.    Social History:  Marital Status:   [2]  Social History     Tobacco Use     Smoking status: Not on file   Substance Use Topics     Alcohol use: Not on file     Drug use: Not on file        Medications:    No current outpatient medications on file.        Review of Systems   Constitutional: Negative.    Musculoskeletal:        Right foot and ankle pain   Skin: Negative.    All other systems reviewed and are negative.      Physical Exam   BP: 134/82  Pulse: 71  Temp: 98  F (36.7  C)  Resp: 16  Weight: 65.8 kg (145 lb)  SpO2: 99 %      Physical Exam  Constitutional:       General: She is not in acute distress.     Appearance: Normal appearance. She is not ill-appearing, toxic-appearing or diaphoretic.   HENT:      Head: Normocephalic and atraumatic.   Cardiovascular:      Pulses: Normal pulses.   Pulmonary:      Effort: Pulmonary effort is normal.   Musculoskeletal:      Right ankle: She exhibits normal range of motion, no swelling, no ecchymosis, no deformity, no laceration and normal pulse. Tenderness. AITFL tenderness found. No lateral malleolus, no medial malleolus, no CF ligament, no posterior TFL, no head of 5th metatarsal and no proximal fibula  tenderness found.      Right foot: Normal range of motion and normal capillary refill. Tenderness present. No swelling.   Skin:     General: Skin is warm and dry.      Capillary Refill: Capillary refill takes less than 2 seconds.   Neurological:      General: No focal deficit present.      Mental Status: She is alert.      Sensory: Sensation is intact.      Motor: Motor function is intact.         ED Course        Procedures      Results for orders placed or performed during the hospital encounter of 09/07/20 (from the past 24 hour(s))   Ankle XR, G/E 3 views, right    Narrative    EXAM: XR ANKLE RT G/E 3 VW, XR FOOT RT G/E 3 VW  LOCATION: [facility]  DATE/TIME: 9/7/2020 12:19 PM    INDICATION: Pain, injury.  COMPARISON: None.      Impression    IMPRESSION: No foot or ankle fracture. Joint spaces are maintained. Ankle mortise is intact.   Foot  XR, G/E 3 views, right    Narrative    EXAM: XR ANKLE RT G/E 3 VW, XR FOOT RT G/E 3 VW  LOCATION: [facility]  DATE/TIME: 9/7/2020 12:19 PM    INDICATION: Pain, injury.  COMPARISON: None.      Impression    IMPRESSION: No foot or ankle fracture. Joint spaces are maintained. Ankle mortise is intact.       Medications - No data to display    Assessments & Plan (with Medical Decision Making)     Pt is a 65 year old female who presents with complaints of right foot and ankle pain since she misstepped on the stairs last night.  Patient describes persistent pain throughout her anterior foot and ankle since that time that is worse with weightbearing.      Pt is afebrile on arrival.  Exam as above.  X-rays of right foot and ankle were negative for fracture or acute pathology.  Discussed results with patient.  Encouraged symptomatic treatments at home.  Return precautions were reviewed.  Hand-outs were provided.    Patient was instructed to follow-up with PCP if no improvement in 3-5 days for continued care and management or sooner if new or worsening  symptoms.  She is to return to the ED for persistent and/or worsening symptoms.  Patient expressed understanding of the diagnosis and plan and was discharged home in good condition.    I have reviewed the nursing notes.    I have reviewed the findings, diagnosis, plan and need for follow up with the patient.    There are no discharge medications for this patient.      Final diagnoses:   Pain in joint involving ankle and foot, right       9/7/2020   [facility] EMERGENCY DEPARTMENT      Disclaimer:  This note consists of symbols derived from keyboarding, dictation and/or voice recognition software.  As a result, there may be errors in the script that have gone undetected.  Please consider this when interpreting information found in this chart.     Chio Geller PA-C  09/07/20 [id number]

## 2020-09-10 ENCOUNTER — HOSPITAL ENCOUNTER (OUTPATIENT)
Dept: ULTRASOUND IMAGING | Facility: HOSPITAL | Age: 65
Discharge: HOME | End: 2020-09-10
Attending: INTERNAL MEDICINE

## 2020-09-10 DIAGNOSIS — E04.1 THYROID NODULE: ICD-10-CM

## 2020-09-24 ENCOUNTER — VIRTUAL VISIT (OUTPATIENT)
Dept: FAMILY MEDICINE | Facility: OTHER | Age: 65
End: 2020-09-24

## 2020-09-24 DIAGNOSIS — Z20.822 SUSPECTED 2019 NOVEL CORONAVIRUS INFECTION: Primary | ICD-10-CM

## 2020-09-24 NOTE — PROGRESS NOTES
"Date: 2020 12:38:29  Clinician: Anoop Jean  Clinician NPI: [id number]  Patient: Lakisha Cooper  Patient : 1955  Patient Address: [address] [place] [address]  Patient Phone: [phone]  Visit Protocol: URI  Patient Summary:  Lakisha is a 65 year old ( : 1955 ) female who initiated a [facility] Visit for COVID-19 (Coronavirus) evaluation and screening. When asked the question \"Please sign me up to receive news, health information and promotions. \", Lakisha responded \"No\".    Lakisha states her symptoms started gradually 3-4 days ago.   Her symptoms consist of a cough, myalgia, malaise, and a sore throat.   Symptom details     Cough: Lakisha coughs a few times an hour and her cough is not more bothersome at night. Phlegm does not come into her throat when she coughs. She does not believe her cough is caused by post-nasal drip.     Sore throat: Lakisha reports having mild throat pain (1-3 on a 10 point pain scale), does not have exudate on her tonsils, and can swallow liquids. The lymph nodes in her neck are not enlarged. A rash has not appeared on the skin since the sore throat started.      Lakisha denies having nasal congestion, headache, ear pain, anosmia, vomiting, rhinitis, nausea, wheezing, enlarged lymph nodes, facial pain or pressure, fever, chills, teeth pain, ageusia, and diarrhea. She also denies double sickening (worsening symptoms after initial improvement), taking antibiotic medication in the past month, and having recent facial or sinus surgery in the past 60 days. She is not experiencing dyspnea.   Precipitating events  Within the past week, Lakisha has not been exposed to someone with strep throat. She has not recently been exposed to someone with influenza. Lakisha has been in close contact with the following high risk individuals: adults 65 or older, people with asthma, heart disease or diabetes, and immunocompromised people.   Pertinent COVID-19 (Coronavirus) " information  In the past 14 days, Lakisha has not worked in a congregate living setting.   She either works or volunteers as a healthcare worker or a , or works or volunteers in a healthcare facility. 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 She lives with a healthcare worker.   Lakisha has not had a close contact with a laboratory-confirmed COVID-19 patient within 14 days of symptom onset.   Since December 2019, Lakisha and has not had upper respiratory infection or influenza-like illness. Has not been diagnosed with lab-confirmed COVID-19 test   Pertinent medical history  Lakisha does not get yeast infections when she takes antibiotics.   Lakisha does not need a return to work/school note.   Weight: 145 lbs   Lakisha does not smoke or use smokeless tobacco.   Weight: 145 lbs    MEDICATIONS: lisinopril oral, ALLERGIES: NKDA  Clinician Response:  Dear Lakisha,   Your symptoms show that you may have coronavirus (COVID-19). This illness can cause fever, cough and trouble breathing. Many people get a mild case and get better on their own. Some people can get very sick.  What should I do?  We would like to test you for this virus.   1. Please call [phone] to schedule your visit. Explain that you were [facility] to have a COVID-19 test. Be ready to share your [facility] visit ID number.  The following will serve as your written order for this COVID Test, ordered by me, for the indication of suspected COVID [Z20.828]: The test will be ordered in [employer], our electronic health record, after you are scheduled. It will show as ordered and authorized by Luiz Granado MD.  Order: COVID-19 (Coronavirus) PCR for SYMPTOMATIC testing from [facility].      2. When it's time for your COVID test:  Stay at least 6 feet away  "from others. (If someone will drive you to your test, stay in the backseat, as far away from the  as you can.)   Cover your mouth and nose with a mask, tissue or washcloth.  Go straight to the testing site. Don't make any stops on the way there or back.      3.Starting now: Stay home and away from others (self-isolate) until:   You've had no fever---and no medicine that reduces fever---for one full day (24 hours). And...   Your other symptoms have gotten better. For example, your cough or breathing has improved. And...   At least 10 days have passed since your symptoms started.       During this time, don't leave the house except for testing or medical care.   Stay in your own room, even for meals. Use your own bathroom if you can.   Stay away from others in your home. No hugging, kissing or shaking hands. No visitors.  Don't go to work, school or anywhere else.    Clean \"high touch\" surfaces often (doorknobs, counters, handles, etc.). Use a household cleaning spray or wipes. You'll find a full list of  on the EPA website: www.epa.gov/pesticide-registration/list-n-disinfectants-use-against-sars-cov-2.   Cover your mouth and nose with a mask, tissue or washcloth to avoid spreading germs.  Wash your hands and face often. Use soap and water.  Caregivers in these groups are at risk for severe illness due to COVID-19:  o People 65 years and older  o People who live in a nursing home or long-term care facility  o People with chronic disease (lung, heart, cancer, diabetes, kidney, liver, immunologic)  o People who have a weakened immune system, including those who:   Are in cancer treatment  Take medicine that weakens the immune system, such as corticosteroids  Had a bone marrow or organ transplant  Have an immune deficiency  Have poorly controlled HIV or AIDS  Are obese (body mass index of 40 or higher)  Smoke regularly   o Caregivers should wear gloves while washing dishes, handling laundry and cleaning " bedrooms and bathrooms.  o Use caution when washing and drying laundry: Don't shake dirty laundry, and use the warmest water setting that you can.  o For more tips, go to www.cdc.gov/coronavirus/2019-ncov/downloads/10Things.pdf.    4.Sign up for [employer]. We know it's scary to hear that you might have COVID-19. We want to track your symptoms to make sure you're okay over the next 2 weeks. Please look for an email from [employer]---this is a free, online program that we'll use to keep in touch. To sign up, follow the link in the email. Learn more at [email or web address]  How can I take care of myself?   Get lots of rest. Drink extra fluids (unless a doctor has told you not to).   Take Tylenol (acetaminophen) for fever or pain. If you have liver or kidney problems, ask your family doctor if it's okay to take Tylenol.   Adults can take either:    650 mg (two 325 mg pills) every 4 to 6 hours, or...   1,000 mg (two 500 mg pills) every 8 hours as needed.    Note: Don't take more than 3,000 mg in one day. Acetaminophen is found in many medicines (both prescribed and over-the-counter medicines). Read all labels to be sure you don't take too much.   For children, check the Tylenol bottle for the right dose. The dose is based on the child's age or weight.    If you have other health problems (like cancer, heart failure, an organ transplant or severe kidney disease): Call your specialty clinic if you don't feel better in the next 2 days.       Know when to call 911. Emergency warning signs include:    Trouble breathing or shortness of breath Pain or pressure in the chest that doesn't go away Feeling confused like you haven't felt before, or not being able to wake up Bluish-colored lips or face.  Where can I get more information?    [facility] -- About COVID-19: [email or web address]   CDC -- What to Do If You're Sick: www.cdc.gov/coronavirus/2019-ncov/about/steps-when-sick.html   [facility] --  Ending Home Isolation: www.cdc.gov/coronavirus/2019-ncov/hcp/disposition-in-home-patients.html   CDC -- Caring for Someone: www.cdc.gov/coronavirus/2019-ncov/if-you-are-sick/care-for-someone.html   [facility] -- Interim Guidance for Hospital Discharge to Home: www.[facility].[place]./diseases/coronavirus/hcp/hospdischarge.pdf   [facility] clinical trials (COVID-19 research studies): clinicalaffairs.[facility]/n-clinical-trials    Below are the COVID-19 hotlines at the [place] Department of Health ([facility]). Interpreters are available.    For health questions: Call 039-102-5816 or 1-198.964.7377 (7 a.m. to 7 p.m.) For questions about schools and childcare: Call 635-453-5311 or 1-321.836.3838 (7 a.m. to 7 p.m.)    Diagnosis: Other malaise  Diagnosis ICD: R53.81

## 2020-09-25 DIAGNOSIS — Z20.822 SUSPECTED 2019 NOVEL CORONAVIRUS INFECTION: ICD-10-CM

## 2020-09-25 PROCEDURE — U0003 INFECTIOUS AGENT DETECTION BY NUCLEIC ACID (DNA OR RNA); SEVERE ACUTE RESPIRATORY SYNDROME CORONAVIRUS 2 (SARS-COV-2) (CORONAVIRUS DISEASE [COVID-19]), AMPLIFIED PROBE TECHNIQUE, MAKING USE OF HIGH THROUGHPUT TECHNOLOGIES AS DESCRIBED BY CMS-2020-01-R: HCPCS | Performed by: FAMILY MEDICINE

## 2020-09-26 LAB
SARS-COV-2 RNA SPEC QL NAA+PROBE: NOT DETECTED
SPECIMEN SOURCE: NORMAL

## 2020-11-12 ENCOUNTER — HOSPITAL ENCOUNTER (OUTPATIENT)
Dept: MAMMOGRAPHY | Facility: CLINIC | Age: 65
Discharge: HOME | End: 2020-11-12
Attending: INTERNAL MEDICINE

## 2020-11-12 DIAGNOSIS — Z12.31 VISIT FOR SCREENING MAMMOGRAM: ICD-10-CM

## 2020-11-23 ENCOUNTER — COMMUNICATION - HEALTHEAST (OUTPATIENT)
Dept: FAMILY MEDICINE | Facility: CLINIC | Age: 65
End: 2020-11-23

## 2020-11-23 DIAGNOSIS — I10 ESSENTIAL HYPERTENSION, BENIGN: ICD-10-CM

## 2020-11-23 DIAGNOSIS — I10 ESSENTIAL HYPERTENSION: ICD-10-CM

## 2020-11-23 RX ORDER — LISINOPRIL 10 MG/1
20 TABLET ORAL DAILY
Qty: 180 TABLET | Refills: 2 | Status: SHIPPED | OUTPATIENT
Start: 2020-11-23 | End: 2021-08-17

## 2020-11-23 RX ORDER — HYDROCHLOROTHIAZIDE 12.5 MG/1
12.5 TABLET ORAL DAILY
Qty: 90 TABLET | Refills: 2 | Status: SHIPPED | OUTPATIENT
Start: 2020-11-23 | End: 2021-08-17

## 2021-01-03 ENCOUNTER — HEALTH MAINTENANCE LETTER (OUTPATIENT)
Age: 66
End: 2021-01-03

## 2021-01-21 ENCOUNTER — RECORDS - HEALTHEAST (OUTPATIENT)
Dept: ADMINISTRATIVE | Facility: OTHER | Age: 66
End: 2021-01-21

## 2021-01-21 ENCOUNTER — COMMUNICATION - HEALTHEAST (OUTPATIENT)
Dept: FAMILY MEDICINE | Facility: CLINIC | Age: 66
End: 2021-01-21

## 2021-01-21 DIAGNOSIS — E78.2 MIXED HYPERLIPIDEMIA: ICD-10-CM

## 2021-01-22 RX ORDER — ATORVASTATIN CALCIUM 20 MG/1
20 TABLET, FILM COATED ORAL DAILY
Qty: 30 TABLET | Refills: 5 | Status: SHIPPED | OUTPATIENT
Start: 2021-01-22 | End: 2021-09-26

## 2021-02-10 ENCOUNTER — RECORDS - HEALTHEAST (OUTPATIENT)
Dept: ADMINISTRATIVE | Facility: OTHER | Age: 66
End: 2021-02-10

## 2021-05-21 ENCOUNTER — COMMUNICATION - HEALTHEAST (OUTPATIENT)
Dept: FAMILY MEDICINE | Facility: CLINIC | Age: 66
End: 2021-05-21

## 2021-05-21 DIAGNOSIS — F32.1 CURRENT MODERATE EPISODE OF MAJOR DEPRESSIVE DISORDER WITHOUT PRIOR EPISODE (H): ICD-10-CM

## 2021-05-23 RX ORDER — FLUOXETINE 20 MG/1
40 TABLET, FILM COATED ORAL DAILY
Qty: 180 TABLET | Refills: 0 | Status: SHIPPED | OUTPATIENT
Start: 2021-05-23 | End: 2022-10-26

## 2021-05-24 ENCOUNTER — RECORDS - HEALTHEAST (OUTPATIENT)
Dept: ADMINISTRATIVE | Facility: CLINIC | Age: 66
End: 2021-05-24

## 2021-05-25 ENCOUNTER — RECORDS - HEALTHEAST (OUTPATIENT)
Dept: ADMINISTRATIVE | Facility: CLINIC | Age: 66
End: 2021-05-25

## 2021-05-26 ENCOUNTER — RECORDS - HEALTHEAST (OUTPATIENT)
Dept: ADMINISTRATIVE | Facility: CLINIC | Age: 66
End: 2021-05-26

## 2021-05-26 ASSESSMENT — PATIENT HEALTH QUESTIONNAIRE - PHQ9: SUM OF ALL RESPONSES TO PHQ QUESTIONS 1-9: 3

## 2021-05-27 ENCOUNTER — RECORDS - HEALTHEAST (OUTPATIENT)
Dept: ADMINISTRATIVE | Facility: CLINIC | Age: 66
End: 2021-05-27

## 2021-05-27 VITALS — SYSTOLIC BLOOD PRESSURE: 136 MMHG | DIASTOLIC BLOOD PRESSURE: 86 MMHG

## 2021-05-27 VITALS — DIASTOLIC BLOOD PRESSURE: 101 MMHG | SYSTOLIC BLOOD PRESSURE: 166 MMHG

## 2021-05-28 ENCOUNTER — RECORDS - HEALTHEAST (OUTPATIENT)
Dept: ADMINISTRATIVE | Facility: CLINIC | Age: 66
End: 2021-05-28

## 2021-05-28 NOTE — TELEPHONE ENCOUNTER
RN cannot approve Refill Request    RN can NOT refill this medication med is not covered by policy/route to provider.    Дмитрий Caldwell, [facility] Triage/Med Refill 4/23/2019    Requested Prescriptions   Pending Prescriptions Disp Refills     primidone (MYSOLINE) 50 MG tablet [Pharmacy Med Name: PRIMIDONE 50 MG TABLET] 90 tablet 0     Sig: Take 1 tablet (50 mg total) by mouth at bedtime.       There is no refill protocol information for this order

## 2021-05-29 NOTE — TELEPHONE ENCOUNTER
Refill Approved    Rx renewed per Medication Renewal Policy. Medication was last renewed on 2/7/19.    Lucrecia Dhillon, [facility] Connection Triage/Med Refill 5/22/2019     Requested Prescriptions   Pending Prescriptions Disp Refills     atorvastatin (LIPITOR) 10 MG tablet [Pharmacy Med Name: ATORVASTATIN CALCIUM 10 MG TABLET] 90 tablet 0     Sig: Take 1 tablet (10 mg total) by mouth at bedtime.       Statins Refill Protocol (Hmg CoA Reductase Inhibitors) Passed - 5/21/2019 11:15 AM        Passed - PCP or prescribing provider visit in past 12 months      Last office visit with prescriber/PCP: Visit date not found OR same dept: 10/30/2018 Boris Sabillon MD OR same specialty: 10/30/2018 Boris Sabillon MD  Last physical: 2/7/2019 Last MTM visit: Visit date not found   Next visit within 3 mo: Visit date not found  Next physical within 3 mo: Visit date not found  Prescriber OR PCP: Comfort Zeng MD  Last diagnosis associated with med order: 1. Mixed hyperlipidemia  - atorvastatin (LIPITOR) 10 MG tablet [Pharmacy Med Name: ATORVASTATIN CALCIUM 10 MG TABLET]; Take 1 tablet (10 mg total) by mouth at bedtime.  Dispense: 90 tablet; Refill: 0    2. Benign Essential Hypertension  - metoprolol succinate (TOPROL-XL) 25 MG [Pharmacy Med Name: METOPROLOL SUCCINATE 25 MG TAB ER 24H]; Take 1 tablet (25 mg total) by mouth daily.  Dispense: 90 tablet; Refill: 2    If protocol passes may refill for 12 months if within 3 months of last provider visit (or a total of 15 months).             metoprolol succinate (TOPROL-XL) 25 MG [Pharmacy Med Name: METOPROLOL SUCCINATE 25 MG TAB ER 24H] 90 tablet 2     Sig: Take 1 tablet (25 mg total) by mouth daily.       Beta-Blockers Refill Protocol Passed - 5/21/2019 11:15 AM        Passed - PCP or prescribing provider visit in past 12 months or next 3 months     Last office visit with prescriber/PCP: Visit date not found OR same dept: 10/30/2018 Boris Sabillon MD OR same specialty: 10/30/2018  Boris Sabillon MD  Last physical: 2/7/2019 Last MTM visit: Visit date not found   Next visit within 3 mo: Visit date not found  Next physical within 3 mo: Visit date not found  Prescriber OR PCP: Comfort Zeng MD  Last diagnosis associated with med order: 1. Mixed hyperlipidemia  - atorvastatin (LIPITOR) 10 MG tablet [Pharmacy Med Name: ATORVASTATIN CALCIUM 10 MG TABLET]; Take 1 tablet (10 mg total) by mouth at bedtime.  Dispense: 90 tablet; Refill: 0    2. Benign Essential Hypertension  - metoprolol succinate (TOPROL-XL) 25 MG [Pharmacy Med Name: METOPROLOL SUCCINATE 25 MG TAB ER 24H]; Take 1 tablet (25 mg total) by mouth daily.  Dispense: 90 tablet; Refill: 2    If protocol passes may refill for 12 months if within 3 months of last provider visit (or a total of 15 months).             Passed - Blood pressure filed in past 12 months     BP Readings from Last 1 Encounters:   02/07/19 126/74

## 2021-05-30 ENCOUNTER — RECORDS - HEALTHEAST (OUTPATIENT)
Dept: ADMINISTRATIVE | Facility: CLINIC | Age: 66
End: 2021-05-30

## 2021-05-30 VITALS — BODY MASS INDEX: 30.47 KG/M2 | HEIGHT: 60 IN

## 2021-05-30 VITALS — HEIGHT: 60 IN | WEIGHT: 156 LBS | BODY MASS INDEX: 30.63 KG/M2

## 2021-05-31 ENCOUNTER — RECORDS - HEALTHEAST (OUTPATIENT)
Dept: ADMINISTRATIVE | Facility: CLINIC | Age: 66
End: 2021-05-31

## 2021-05-31 VITALS — WEIGHT: 149 LBS | BODY MASS INDEX: 29.25 KG/M2 | HEIGHT: 60 IN

## 2021-05-31 NOTE — TELEPHONE ENCOUNTER
RN cannot approve Refill Request    RN can NOT refill this medication pt stated is taking 20 mg daily.     Lucrecia Dhillon, [facility] Connection Triage/Med Refill 8/22/2019    Requested Prescriptions   Pending Prescriptions Disp Refills     FLUoxetine (PROZAC) 20 MG capsule 180 capsule 1     Sig: TAKE 1 CAPSULE BY MOUTH TWICE A DAY       SSRI Refill Protocol  Passed - 8/22/2019  9:45 AM        Passed - PCP or prescribing provider visit in last year     Last office visit with prescriber/PCP: Visit date not found OR same dept: 10/30/2018 Boris Sabillon MD OR same specialty: 10/30/2018 Boris Sabillon MD  Last physical: 2/7/2019 Last MTM visit: Visit date not found   Next visit within 3 mo: Visit date not found  Next physical within 3 mo: Visit date not found  Prescriber OR PCP: Comfort Zeng MD  Last diagnosis associated with med order: 1. Depression  - FLUoxetine (PROZAC) 20 MG capsule; TAKE 1 CAPSULE BY MOUTH TWICE A DAY  Dispense: 180 capsule; Refill: 1    If protocol passes may refill for 12 months if within 3 months of last provider visit (or a total of 15 months).

## 2021-06-01 ENCOUNTER — RECORDS - HEALTHEAST (OUTPATIENT)
Dept: ADMINISTRATIVE | Facility: CLINIC | Age: 66
End: 2021-06-01

## 2021-06-01 VITALS — WEIGHT: 146 LBS | HEIGHT: 60 IN | BODY MASS INDEX: 28.66 KG/M2

## 2021-06-01 VITALS — HEIGHT: 60 IN | WEIGHT: 148 LBS | BODY MASS INDEX: 29.06 KG/M2

## 2021-06-01 NOTE — PROGRESS NOTES
Preoperative Exam    Scheduled Procedure: Cataract lens removal and replacement of right eye  Surgery Date:  10/14/19  Surgery Location: [facility], [place], fax [phone]    Surgeon:  Dr Lucrecia Calvo    Assessment/Plan:     Lakisha was seen today for pre-op exam.    Pre-op exam    Surgical Procedure Risk: Low (reported cardiac risk generally < 1%)  Have you had prior anesthesia?: No  Have you or any family members had a previous anesthesia reaction:  No  Do you or any family members have a history of a clotting or bleeding disorder?: No  Cardiac Risk Assessment: no increased risk for major cardiac complications    APPROVAL GIVEN to proceed with proposed procedure, without further diagnostic evaluation    Please Note:  none    Functional Status: Independent  Patient plans to recover at home with family.     Subjective:      Lakisha Cooper is a 64 y.o. female who presents for a preoperative consultation.      All other systems reviewed and are negative, other than those listed in the HPI.    Pertinent History  Do you have difficulty breathing or chest pain after walking up a flight of stairs: No  History of obstructive sleep apnea: no  Steroid use in the last 6 months: No  Frequent Aspirin/NSAID use: No  Prior Blood Transfusion: No  Prior Blood Transfusion Reaction: No  If for some reason prior to, during or after the procedure, if it is medically indicated, would you be willing to have a blood transfusion?:  There is no transfusion refusal.    Current Outpatient Medications   Medication Sig Dispense Refill     ALPRAZolam (XANAX) 0.25 MG tablet TAKE ONE TABLET BY MOUTH ONE TIME DAILY AT BEDTIME IF NEEDED FOR SLEEP 30 tablet 0     artificial tears,hypromellose, (ISOPTO TEARS) 0.5 % ophthalmic solution Apply 1 drop to eye.       atorvastatin (LIPITOR) 10 MG tablet Take 1 tablet (10 mg total) by mouth at bedtime. 90 tablet 2     FLUoxetine (PROZAC) 20 MG capsule TAKE 1 CAPSULE BY MOUTH DAILY 90 capsule  3     ibuprofen (ADVIL,MOTRIN) 600 MG tablet        metoprolol succinate (TOPROL XL) 50 MG 24 hr tablet Take 1 tablet (50 mg total) by mouth daily. 90 tablet 3     pantoprazole (PROTONIX) 40 MG tablet TAKE 1 TABLET DAILY 90 tablet 3     primidone (MYSOLINE) 50 MG tablet Take 1 tablet (50 mg total) by mouth at bedtime. 90 tablet 2     No current facility-administered medications for this visit.         No Known Allergies    Patient Active Problem List   Diagnosis     Hyperlipidemia     Osteoarthritis Of The Knee     Cervical Radiculopathy     Current moderate episode of major depressive disorder without prior episode (H)     Benign Essential Hypertension     Benign familial tremor     Gastritis     Anxiety       Past Medical History:   Diagnosis Date     Anxiety and depression        Past Surgical History:   Procedure Laterality Date     BREAST BIOPSY Right     yrs ago  benign     BREAST BIOPSY Right      HYSTERECTOMY       OOPHORECTOMY       IL EXCIS THYROID DUCT CYST/SINUS      Description: Thyroglossal Duct Cyst Excision;  Recorded: 09/07/2008;     IL OPEN RX DISTAL RADIUS FX, EXTRA-ARTICULAR      Description: Open Treatment Of Fracture Of Distal Radius;  Recorded: 09/07/2008;     REDUCTION MAMMAPLASTY  2012       Social History     Socioeconomic History     Marital status:      Spouse name: Not on file     Number of children: Not on file     Years of education: Not on file     Highest education level: Not on file   Occupational History     Not on file   Social Needs     Financial resource strain: Not on file     Food insecurity:     Worry: Not on file     Inability: Not on file     Transportation needs:     Medical: Not on file     Non-medical: Not on file   Tobacco Use     Smoking status: Never Smoker     Smokeless tobacco: Never Used   Substance and Sexual Activity     Alcohol use: Not on file     Drug use: Not on file     Sexual activity: Not on file   Lifestyle     Physical activity:     Days per week:  Not on file     Minutes per session: Not on file     Stress: Not on file   Relationships     Social connections:     Talks on phone: Not on file     Gets together: Not on file     Attends Rastafarian service: Not on file     Active member of club or organization: Not on file     Attends meetings of clubs or organizations: Not on file     Relationship status: Not on file     Intimate partner violence:     Fear of current or ex partner: Not on file     Emotionally abused: Not on file     Physically abused: Not on file     Forced sexual activity: Not on file   Other Topics Concern     Not on file   Social History Narrative     Not on file       Patient Care Team:  Comfort Zeng MD as PCP - General (Family Medicine)  Comfort Zeng MD as Assigned PCP          Objective:     Vitals:    10/02/19 0935   BP: 126/80   Pulse: 64   Resp: 16   Temp: 97.9  F (36.6  C)   TempSrc: Oral   SpO2: 95%   Weight: 148 lb 8 oz (67.4 kg)   Height: 5' (1.524 m)         Physical Exam:  Gen: The patient appears well, in NAD.    HEENT: PERRL, EOMI. Oral mucosa is moist and pink, normal dentition.    Neck: supple. No adenopathy or thyromegaly.    Resp: Lungs are clear, good air entry, no wheezes, rhonchi or rales.    CV: S1 and S2 normal, no murmurs, regular rate and rhythm.    Musculoskeletal: Normal gait and strength.     Neuro: no gross focal deficits, alert and oriented    Psych: affect is bright and appropriate      There are no Patient Instructions on file for this visit.      Labs:  Recent Results (from the past 48 hour(s))   Basic Metabolic Panel    Collection Time: 10/02/19 10:14 AM   Result Value Ref Range    Sodium 140 136 - 145 mmol/L    Potassium 4.5 3.5 - 5.0 mmol/L    Chloride 108 (H) 98 - 107 mmol/L    CO2 22 22 - 31 mmol/L    Anion Gap, Calculation 10 5 - 18 mmol/L    Glucose 104 70 - 125 mg/dL    Calcium 9.3 8.5 - 10.5 mg/dL    BUN 21 8 - 22 mg/dL    Creatinine 0.71 0.60 - 1.10 mg/dL    GFR MDRD Af Amer >60 >60 mL/min/1.73m2    GFR  MDRD Non Af Amer >60 >60 mL/min/1.73m2   HM2(CBC w/o Differential)    Collection Time: 10/02/19 10:14 AM   Result Value Ref Range    WBC 5.0 4.0 - 11.0 thou/uL    RBC 4.29 3.80 - 5.40 mill/uL    Hemoglobin 13.0 12.0 - 16.0 g/dL    Hematocrit 39.2 35.0 - 47.0 %    MCV 91 80 - 100 fL    MCH 30.3 27.0 - 34.0 pg    MCHC 33.3 32.0 - 36.0 g/dL    RDW 10.9 (L) 11.0 - 14.5 %    Platelets 266 140 - 440 thou/uL    MPV 7.7 7.0 - 10.0 fL   Thyroid Stimulating Hormone (TSH)    Collection Time: 10/02/19 10:14 AM   Result Value Ref Range    TSH 0.85 0.30 - 5.00 uIU/mL        Immunization History   Administered Date(s) Administered     DT (pediatric) 06/13/2005     Hep A, Adult IM (19yr & older) 06/13/2005     Hep A, historic 01/19/2010     Influenza, inj, historic,unspecified 10/01/2013, 10/01/2018     Influenza,D6C4-32,Pandemic,split,IM 12/03/2009     Td,adult,historic,unspecified 06/13/2005     Tdap 01/08/2015     Typhoid, Inj, Inactive 01/08/2015           Electronically signed by Comfort Zeng MD 10/02/19 9:38 AM

## 2021-06-01 NOTE — PATIENT INSTRUCTIONS - HE
Start lisinopril 10 mg/day.     Decrease metoprolol to 25 mg per day for the next week. Then, stop it.     Come back for a blood pressure check in 2 weeks.

## 2021-06-02 VITALS — HEIGHT: 60 IN | BODY MASS INDEX: 28.47 KG/M2 | WEIGHT: 145 LBS

## 2021-06-02 VITALS — WEIGHT: 147.75 LBS | HEIGHT: 60 IN | BODY MASS INDEX: 29.01 KG/M2

## 2021-06-02 NOTE — TELEPHONE ENCOUNTER
Refill Approved    Rx renewed per Medication Renewal Policy. Medication was last renewed on 11/5/18.    Patricia Avitia, [facility] Connection Triage/Med Refill 11/3/2019     Requested Prescriptions   Pending Prescriptions Disp Refills     pantoprazole (PROTONIX) 40 MG tablet 90 tablet 3     Sig: TAKE 1 TABLET DAILY       GI Medications Refill Protocol Passed - 11/3/2019  7:29 PM        Passed - PCP or prescribing provider visit in last 12 or next 3 months.     Last office visit with prescriber/PCP: Visit date not found OR same dept: Visit date not found OR same specialty: 10/30/2018 Boris Sabillon MD  Last physical: 10/2/2019 Last MTM visit: Visit date not found   Next visit within 3 mo: Visit date not found  Next physical within 3 mo: Visit date not found  Prescriber OR PCP: Comfort Zeng MD  Last diagnosis associated with med order: 1. Gastritis  - pantoprazole (PROTONIX) 40 MG tablet; TAKE 1 TABLET DAILY  Dispense: 90 tablet; Refill: 3    If protocol passes may refill for 12 months if within 3 months of last provider visit (or a total of 15 months).

## 2021-06-02 NOTE — PROGRESS NOTES
"KEIKO Cooper is a 64 y.o. female here for a pre-op exam for cataract surgery (see separate note), but has a few other concerns to bring up as well.    She has felt fatigued recently, almost falling asleep while stopped at a stoplight at times.  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  She is wondering if her metoprolol is making it too low and if she can change to a different blood pressure medication.    Her depression is stable, though she is dealing with the stress of her mother passing away at the end of August and caring for her father who has dementia, as well as stress at work.  Additionally, her dog  last weekend. She feels anxious frequently. She would like a small refill of her 0.25 mg Xanax tabs. She takes \"a bite\" every so often. States she goes through about 4 0.25 mg Xanax pills every 6 months.     She has been told in the past by people she slept in the same room with that she seemed to stop breathing. She does not snore, as far as she knows. She had a sleep study ordered by [facility] but it wasn't covered by her insurance so she never had it.      Past Medical History:   Diagnosis Date     Anxiety and depression      Current Outpatient Medications on File Prior to Visit   Medication Sig Dispense Refill     artificial tears,hypromellose, (ISOPTO TEARS) 0.5 % ophthalmic solution Apply 1 drop to eye.       atorvastatin (LIPITOR) 10 MG tablet Take 1 tablet (10 mg total) by mouth at bedtime. 90 tablet 2     FLUoxetine (PROZAC) 20 MG capsule TAKE 1 CAPSULE BY MOUTH DAILY 90 capsule 3     ibuprofen (ADVIL,MOTRIN) 600 MG tablet        metoprolol succinate (TOPROL XL) 50 MG 24 hr tablet Take 1 tablet (50 mg total) by mouth daily. 90 tablet 3     pantoprazole (PROTONIX) 40 MG tablet TAKE 1 TABLET DAILY 90 tablet 3     primidone (MYSOLINE) 50 MG tablet Take 1 tablet (50 mg total) by mouth at bedtime. 90 tablet 2     No current " facility-administered medications on file prior to visit.        Past medical and social history reviewed with no changes.   ?  ?  O  /80   Pulse 64   Temp 97.9  F (36.6  C) (Oral)   Resp 16   Ht 5' (1.524 m)   Wt 148 lb 8 oz (67.4 kg)   SpO2 95% Comment: ra  BMI 29.00 kg/m     Vitals reviewed. Nursing note reviewed.  General Appearance: Pleasant and alert, in no acute distress  HEENT: mucous membranes moist  CV: RRR, no murmur, rubs, gallops  Resp: No respiratory distress. Clear to auscultation bilaterally. No wheezes, rales, rhonchi  Abd: Soft, nontender, nondistended, bowel sounds present. No masses.  Ext: No peripheral edema, good distal perfusion  Skin: warm, dry, intact, no rash noted  Neuro: no focal deficits, CNs II-XII normal.   Psych: mood and affect are normal.    A/P  Lakisha was seen today for pre-op exam.    Diagnoses and all orders for this visit:    Pre-op exam: see separate note from today's encounter.     Screen for colon cancer: referred for colonoscopy    Need for immunization against influenza  -     Influenza, Seasonal Quad, PF =/> 6months    Current moderate episode of major depressive disorder without prior episode (H): refilled fluoxetine  -     PHQ9 Depression Screen  -     FLUoxetine (PROZAC) 20 MG tablet; Take 2 tablets (40 mg total) by mouth daily.    Benign Essential Hypertension: because she is bradycardic often (though not today), and fatigued, we will try tapering off metoprolol, decreasing to 25 mg/day for the next week and then stopping. Will add lisinopril 10 mg and see her back in 2 weeks to check BP. Increase dose if needed.   -     lisinopril (PRINIVIL,ZESTRIL) 10 MG tablet; Take 1 tablet (10 mg total) by mouth daily.    Encounter for monitoring ACE-inhibitor therapy: getting baseline creatinine  -     Basic Metabolic Panel    Chronic fatigue  -     HM2(CBC w/o Differential)  -     Thyroid Stimulating Hormone (TSH)    Anxiety: prescribed #5 0.25 mg Xanax pills.   -      ALPRAZolam (XANAX) 0.25 MG tablet; TAKE 1/2 TABLET BY MOUTH daily as needed.         Return in about 2 weeks (around 10/16/2019) for blood pressure check.    Options for treatment and follow-up care were reviewed with the patient and/or guardian. Lakisha Cooper and/or guardian engaged in the decision making process and verbalized understanding of the options discussed and agreed with the final plan.    Comfort Zeng MD

## 2021-06-02 NOTE — TELEPHONE ENCOUNTER
RN cannot approve Refill Request    RN can NOT refill this medication new medication prescribed 10/2/2019 with POC to follow-up in 2 weeks. Pt is due for follow-up visit.     Last office visit: 10/2/2019      Buzz Cota, [facility] Connection Triage/Med Refill 10/15/2019    Requested Prescriptions   Pending Prescriptions Disp Refills     lisinopril (PRINIVIL,ZESTRIL) 10 MG tablet [Pharmacy Med Name: LISINOPRIL 10 MG TABLET] 30 tablet 0     Sig: Take 1 tablet (10 mg total) by mouth daily.       Ace Inhibitors Refill Protocol Passed - 10/14/2019  1:10 PM        Passed - PCP or prescribing provider visit in past 12 months       Last office visit with prescriber/PCP: Visit date not found OR same dept: 10/30/2018 Boris Sabillon MD OR same specialty: 10/30/2018 Boris Sabillon MD  Last physical: 10/2/2019 Last MTM visit: Visit date not found   Next visit within 3 mo: Visit date not found  Next physical within 3 mo: Visit date not found  Prescriber OR PCP: Comfort Zeng MD  Last diagnosis associated with med order: 1. Benign Essential Hypertension  - lisinopril (PRINIVIL,ZESTRIL) 10 MG tablet [Pharmacy Med Name: LISINOPRIL 10 MG TABLET]; Take 1 tablet (10 mg total) by mouth daily.  Dispense: 30 tablet; Refill: 0    If protocol passes may refill for 12 months if within 3 months of last provider visit (or a total of 15 months).             Passed - Serum Potassium in past 12 months     Lab Results   Component Value Date    Potassium 4.5 10/02/2019             Passed - Blood pressure filed in past 12 months     BP Readings from Last 1 Encounters:   10/02/19 126/80             Passed - Serum Creatinine in past 12 months     Creatinine   Date Value Ref Range Status   10/02/2019 0.71 0.60 - 1.10 mg/dL Final

## 2021-06-03 VITALS
SYSTOLIC BLOOD PRESSURE: 126 MMHG | OXYGEN SATURATION: 95 % | RESPIRATION RATE: 16 BRPM | WEIGHT: 148.5 LBS | DIASTOLIC BLOOD PRESSURE: 80 MMHG | HEIGHT: 60 IN | TEMPERATURE: 97.9 F | HEART RATE: 64 BPM | BODY MASS INDEX: 29.16 KG/M2

## 2021-06-04 ENCOUNTER — RECORDS - HEALTHEAST (OUTPATIENT)
Dept: ADMINISTRATIVE | Facility: CLINIC | Age: 66
End: 2021-06-04

## 2021-06-04 VITALS
BODY MASS INDEX: 29.3 KG/M2 | TEMPERATURE: 97.9 F | HEIGHT: 60 IN | SYSTOLIC BLOOD PRESSURE: 126 MMHG | WEIGHT: 149.25 LBS | OXYGEN SATURATION: 95 % | HEART RATE: 75 BPM | DIASTOLIC BLOOD PRESSURE: 82 MMHG | RESPIRATION RATE: 16 BRPM

## 2021-06-04 VITALS
OXYGEN SATURATION: 97 % | HEIGHT: 60 IN | DIASTOLIC BLOOD PRESSURE: 80 MMHG | WEIGHT: 145 LBS | SYSTOLIC BLOOD PRESSURE: 132 MMHG | BODY MASS INDEX: 28.47 KG/M2 | HEART RATE: 87 BPM

## 2021-06-04 NOTE — TELEPHONE ENCOUNTER
Refill Approved    Rx renewed per Medication Renewal Policy. Medication was last renewed on 10/1/19.    Beth Lundberg, [facility] Connection Triage/Med Refill 12/4/2019     Requested Prescriptions   Pending Prescriptions Disp Refills     lisinopril (PRINIVIL,ZESTRIL) 10 MG tablet [Pharmacy Med Name: LISINOPRIL 10 MG TABLET] 30 tablet 0     Sig: Take 1 tablet (10 mg total) by mouth daily.       Ace Inhibitors Refill Protocol Passed - 12/4/2019  8:00 AM        Passed - PCP or prescribing provider visit in past 12 months       Last office visit with prescriber/PCP: Visit date not found OR same dept: Visit date not found OR same specialty: 10/30/2018 Boris Sabillon MD  Last physical: 10/2/2019 Last MTM visit: Visit date not found   Next visit within 3 mo: Visit date not found  Next physical within 3 mo: Visit date not found  Prescriber OR PCP: Comfort Zeng MD  Last diagnosis associated with med order: 1. Benign Essential Hypertension  - lisinopril (PRINIVIL,ZESTRIL) 10 MG tablet [Pharmacy Med Name: LISINOPRIL 10 MG TABLET]; Take 1 tablet (10 mg total) by mouth daily.  Dispense: 30 tablet; Refill: 0    If protocol passes may refill for 12 months if within 3 months of last provider visit (or a total of 15 months).             Passed - Serum Potassium in past 12 months     Lab Results   Component Value Date    Potassium 4.5 10/02/2019             Passed - Blood pressure filed in past 12 months     BP Readings from Last 1 Encounters:   10/02/19 126/80             Passed - Serum Creatinine in past 12 months     Creatinine   Date Value Ref Range Status   10/02/2019 0.71 0.60 - 1.10 mg/dL Final

## 2021-06-04 NOTE — PROGRESS NOTES
KEIKO Cooper is a 64 y.o. female here for follow up on blood pressure. She had been on metoprolol for her BP and essential hand tremors but felt that it was making her heart rate quite low (40s-50s) and making her fatigued.     She tapered off the metoprolol and started lisinopril 10 mg/day. Her BP does run high sometimes- has had some systolic readings in the 150s-160s/90s.  She often either takes it at work or when she is at her father's house caring for him. She thinks the BP is higher when she is stressed. She does feel less fatigued being off the metoprolol but sometimes her hand tremors are worse than they used to be.     Taking primidone in the AM. This and CBD oil help her tremor, she thinks.     She has another concern about excessive sweating, just of her face and neck. This seems to come out of nowhere, not necessarily with exertion.  She worries if this has to do with her heart because she had a history of heart disease with her mother and her mother's relatives.   Past Medical History:   Diagnosis Date     Anxiety and depression      Current Outpatient Medications on File Prior to Visit   Medication Sig Dispense Refill     ALPRAZolam (XANAX) 0.25 MG tablet TAKE 1/2 TABLET BY MOUTH daily as needed. 5 tablet 0     artificial tears,hypromellose, (ISOPTO TEARS) 0.5 % ophthalmic solution Apply 1 drop to eye.       atorvastatin (LIPITOR) 10 MG tablet Take 1 tablet (10 mg total) by mouth at bedtime. 90 tablet 2     FLUoxetine (PROZAC) 20 MG tablet Take 2 tablets (40 mg total) by mouth daily. 180 tablet 3     ibuprofen (ADVIL,MOTRIN) 600 MG tablet        ketorolac (ACULAR) 0.5 % ophthalmic solution Instill 1 drop into right eye four times a day  2     lisinopril (PRINIVIL,ZESTRIL) 10 MG tablet Take 1 tablet (10 mg total) by mouth daily. 90 tablet 3     moxifloxacin (VIGAMOX) 0.5 % ophthalmic solution 1 drop Right Eye 4 times a day Start 1 day prior to surgery and use until bottle is finished  1      "pantoprazole (PROTONIX) 40 MG tablet TAKE 1 TABLET DAILY 90 tablet 3     prednisoLONE acetate (PRED-FORTE) 1 % ophthalmic suspension 1 drop Right Eye 4 times a day  2     primidone (MYSOLINE) 50 MG tablet Take 1 tablet (50 mg total) by mouth at bedtime. 90 tablet 2     [DISCONTINUED] FLUoxetine (PROZAC) 20 MG capsule TAKE 1 CAPSULE BY MOUTH DAILY 90 capsule 3     metoprolol succinate (TOPROL XL) 50 MG 24 hr tablet Take 1 tablet (50 mg total) by mouth daily. 90 tablet 3     No current facility-administered medications on file prior to visit.        Past medical and social history reviewed with no changes.   ?  ROS:  CV: No chest pain or shortness of breath.  She does experience palpitations sometimes.  Psych: feels \"Stressed\" but stable.   ?  O  /82   Pulse 75   Temp 97.9  F (36.6  C) (Oral)   Resp 16   Ht 5' (1.524 m)   Wt 149 lb 4 oz (67.7 kg)   SpO2 95% Comment: ra  BMI 29.15 kg/m     Vitals reviewed. Nursing note reviewed.  General Appearance: Pleasant and alert, in no acute distress  HEENT: mucous membranes moist. No bruits heard.   CV: RRR, no murmur, rubs, gallops  Resp: No respiratory distress. Clear to auscultation bilaterally. No wheezes, rales, rhonchi  Skin: warm, dry, intact, no rash noted  Neuro: no focal deficits, CNs II-XII normal.   Psych: mood and affect are normal.    A/P  Lakisha was seen today for follow-up.    Diagnoses and all orders for this visit:    Benign Essential Hypertension: she will try increasing her lisinopril to 20 mg/day to see if this improves her blood pressure readings.  Her blood pressure is at goal today but she does have higher readings, as above.   -     lisinopril (PRINIVIL,ZESTRIL) 10 MG tablet; Take 2 tablets (20 mg total) by mouth daily.    Flushing: She has never checked her blood pressure during periods where she feels very hot and I encouraged her to do so.  She had a hysterectomy and oophorectomy years ago and was on Climara for 10 years but did not have " hot flashes when she stopped it A few years ago.    Palpitations: will check stress Echo given these symptoms and her family history of heart disease.   -     Echo Stress Exercise; Future    Family history of ischemic heart disease         Return in about 10 months (around 10/12/2020) for Annual physical.    Options for treatment and follow-up care were reviewed with the patient and/or guardian. Lakisha Cooper and/or guardian engaged in the decision making process and verbalized understanding of the options discussed and agreed with the final plan.    Comfort Zeng MD

## 2021-06-05 NOTE — TELEPHONE ENCOUNTER
Refill Approved    Rx renewed per Medication Renewal Policy. Medication was last renewed on 5/22/19.    Beth Lundberg, [facility] Triage/Med Refill 2/3/2020     Requested Prescriptions   Pending Prescriptions Disp Refills     atorvastatin (LIPITOR) 10 MG tablet [Pharmacy Med Name: ATORVASTATIN CALCIUM 10 MG TABLET] 30 tablet 0     Sig: TAKE 1 TABLET BY MOUTH AT BEDTIME       Statins Refill Protocol (Hmg CoA Reductase Inhibitors) Passed - 2/3/2020  8:00 AM        Passed - PCP or prescribing provider visit in past 12 months      Last office visit with prescriber/PCP: 12/12/2019 Comfort Zeng MD OR same dept: 12/12/2019 Comfort Zeng MD OR same specialty: 12/12/2019 Comfort Zeng MD  Last physical: 10/2/2019 Last MTM visit: Visit date not found   Next visit within 3 mo: Visit date not found  Next physical within 3 mo: Visit date not found  Prescriber OR PCP: Comfort Zeng MD  Last diagnosis associated with med order: 1. Mixed hyperlipidemia  - atorvastatin (LIPITOR) 10 MG tablet [Pharmacy Med Name: ATORVASTATIN CALCIUM 10 MG TABLET]; TAKE 1 TABLET BY MOUTH AT BEDTIME  Dispense: 30 tablet; Refill: 0    If protocol passes may refill for 12 months if within 3 months of last provider visit (or a total of 15 months).

## 2021-06-08 NOTE — TELEPHONE ENCOUNTER
Pt is scheduled with me on 5/26 but my schedule at [facility] is incredibly full as the face-to-face OB provider and I am DB'd at the time of her visit.     Please switch her back to Dr. Polanco's schedule for next week to do another virtual visit. She did see Dr. Polanco on 5/18 so Dr. Polanco knows her history.     Thank you.     Comfort Zeng MD

## 2021-06-08 NOTE — PROGRESS NOTES
"Lakisha Cooper is a 65 y.o. female who is being evaluated via a billable telephone visit.      Assessment and plan  Lakisha was seen today for dysphagia.    Diagnoses and all orders for this visit:    Swallowing dysfunction  Failed trial of PPI.  Symptoms are unusual, sounds like it is beyond where we can visualize, patient is concerned that she has esophageal diverticuli.  See note below for more details.  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  Denies any palpable masses in or around her neck or throat.  With symptoms of excess salivation, metallic taste, acid reflux, difficulty swallowing, sensation of something being stuck in her throat, frequent regurgitation (sometimes whole pieces of food hours after she eats) that have been happening for over a month and failure of PPI for GERD, I recommend that she follow-up with her ENT for this new symptom.  She has been following with [facility] in [place], will place referral in case she needs them but she does not think that she will.  If ENT finds a negative work-up, will consider sending her to GI.  -     Ambulatory referral to ENT    Follow-up with PCP in 2-3 months for annual physical.    =============================================================    The patient has been notified of following:     \"This telephone visit will be conducted via a call between you and your physician/provider. We have found that certain health care needs can be provided without the need for a physical exam.  This service lets us provide the care you need with a short phone conversation.  If a prescription is necessary we can send it directly to your pharmacy.  If lab work is needed we can place an order for that and you can then stop by our lab to have the test done at a later time.    Telephone visits are billed at different rates depending on your insurance coverage. During this emergency period, for some insurers they " "may be billed the same as an in-person visit.  Please reach out to your insurance provider with any questions.    If during the course of the call the physician/provider feels a telephone visit is not appropriate, you will not be charged for this service.\"    Patient has given verbal consent to a Telephone visit? Yes    What phone number would you like to be contacted at?  [phone]    Patient would like to receive their AVS by AVS Preference: Karine.    Additional provider notes:     For over a month, patient has been having excess salivation and sensation of having tissue or something interfering with her ability to swallow. She often gags on her food or regurgitates whole pieces of food after a few hours. Metallic taste in her mouth that has been happening for over a month, failed trial of PPI.  Symptoms have been getting worse despite being on omeprazole twice per day.    Patient has a family history of thyroid issues, her TSH was normal in October 2019.  However she does state that her mom had normal thyroid function test but had a large goiter or mass on her thyroid prior to her death.  She herself denies any palpable masses in or around her neck/throat.  She has a history of a thyroglossal duct cyst, follows with otolaryngologist, has not seen in a few months, her doctor did retire.  We discussed that this would be the first step in evaluation, ENT work-up is negative we can consider sending her to GI for further work-up.    Phone call start time 10:42 AM  Phone call end time 11:00 AM  Phone call duration: 18 minutes    Bailey Polanco MD    "

## 2021-06-08 NOTE — PROGRESS NOTES
"Lakisha Cooper is a 65 y.o. female who is being evaluated via a billable telephone visit.      Assessment & Plan  Lakisha was seen today for hypertension.    Diagnoses and all orders for this visit:    Benign Essential Hypertension, controlled  Patient was on lisinopril 20 mg daily, started on hydrochlorothiazide 12.5 mg at last visit.  Blood pressure now controlled at 136/86.  With her other symptoms, see note, she is worried that the hydrochlorothiazide could be causing the metallic taste in her mouth.  She wonders if she should stop hydrochlorothiazide, but will trial omeprazole before making medication changes.  If the metallic taste continues despite control of her acid reflux, can consider discontinuing hydrochlorothiazide and replacing another medication.  Denies any symptoms of hypokalemia, declines potassium supplement.    Gastroesophageal reflux disease, esophagitis presence not specified  Patient has pantoprazole on her medication list, would like to switch to omeprazole that she bought over-the-counter.  Helping with her symptoms, will trial this for 1 to 3 months to see if it helps before making changes in her medications.    Follow-up in 1 month if symptoms persist, blood pressure check, medication check.    ==============================================================    The patient has been notified of following:     \"This telephone visit will be conducted via a call between you and your physician/provider. We have found that certain health care needs can be provided without the need for a physical exam.  This service lets us provide the care you need with a short phone conversation.  If a prescription is necessary we can send it directly to your pharmacy.  If lab work is needed we can place an order for that and you can then stop by our lab to have the test done at a later time.    Telephone visits are billed at different rates depending on your insurance coverage. During this emergency period, " "for some insurers they may be billed the same as an in-person visit.  Please reach out to your insurance provider with any questions.    If during the course of the call the physician/provider feels a telephone visit is not appropriate, you will not be charged for this service.\"    Patient has given verbal consent to a Telephone visit? Yes    What phone number would you like to be contacted at?  [phone]    Patient would like to receive their AVS by AVS Preference: Karine.    Additional provider notes:     HTN.   Blood pressure not well controlled on lisinopril 20 mg, discussed with patient that we could increase her lisinopril or start another medication.  We did discuss that with her blood pressure being so high, would likely need another medication.  She is currently on metoprolol just for blood pressure and not for heart disease or any history of heart attack.  We can consider increasing both lisinopril and hydrochlorothiazide, discontinuing metoprolol in the future.    Hand swelling.   Improved, thinks maybe it's her arthritis.  She has been off work a week and symptoms are improving.    Tingling tongue, metallic taste  She has had tongue tingling over past month and symptoms of increasing acid reflux.  She does have pantoprazole on her medication list and says she has been on this for a long time.  She started taking omeprazole once daily and that seems to help with the symptoms.  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  She has been off work for 1 week and still has same symptoms.  She started having a metallic taste in her mouth over the weekend and was concerned that it could be hydrochlorothiazide side effect because that is the only thing she has changed with her medications.  She has a really dry mouth to the point where she coughs and retches, otherwise feeling well.  She does not think this is COVID, denies any other symptoms " of shortness of breath, chest pain, nausea, diarrhea, fevers.  We did discuss that pantoprazole and omeprazole are similar medications and to simplify her medication regimen, she should choose just 1 and take it twice per day.  She will continue omeprazole twice daily.      Phone call start time: 8:20 AM  Phone call end time: 8:45 aM  Phone call duration: 25 minutes    Bailey Polanco MD

## 2021-06-08 NOTE — PROGRESS NOTES
"Lakisha Cooper is a 65 y.o. female who is being evaluated via a billable telephone visit.      Assessment and plan    Lakisha was seen today for hypertension and arm swelling.    Diagnoses and all orders for this visit:    Essential hypertension  Patient was bradycardic on metoprolol, his wean down to that and taking 50 mg daily, lisinopril 20 mg daily.  Blood pressure is not well controlled, discussed options with patient including maxing out on her other medications first but with blood pressure systolic in the 160s to 180s, will likely need an additional medication.  Also can consider weaning off of metoprolol and replacing with other medications, patient does not have any heart disease in her history but she does have tremors.  We will start hydrochlorothiazide, follow-up in 1 week for blood pressure recheck.  If improving or needing dose adjustment, can consider using combination pill once patient's blood pressure stabilized on a regimen.  -     hydroCHLOROthiazide (HYDRODIURIL) 12.5 MG tablet; Take 1 tablet (12.5 mg total) by mouth daily.    Arm swelling  Swelling after a night of sleep, resolves after she wakes up.  Compression is highest on differential, patient's blood pressure is also not well controlled.  Differential does include clot but unlikely with the intermittent symptoms; lymph obstruction.  She also has a history of cervical radiculopathy.  Recommend using a wrist brace at night to prevent compression, follow-up in 1 week.  If no improvement, can consider imaging.    Follow-up in 1 week for blood pressure recheck.  Started diuretic, consider BMP check or supplementing with potassium if symptomatic or concern for hypokalemia.  ===========================================    The patient has been notified of following:     \"This telephone visit will be conducted via a call between you and your physician/provider. We have found that certain health care needs can be provided without the need for a " "physical exam.  This service lets us provide the care you need with a short phone conversation.  If a prescription is necessary we can send it directly to your pharmacy.  If lab work is needed we can place an order for that and you can then stop by our lab to have the test done at a later time.    Telephone visits are billed at different rates depending on your insurance coverage. During this emergency period, for some insurers they may be billed the same as an in-person visit.  Please reach out to your insurance provider with any questions.    If during the course of the call the physician/provider feels a telephone visit is not appropriate, you will not be charged for this service.\"    Patient has given verbal consent to a Telephone visit? Yes      Patient would like to receive their AVS by AVS Preference: Karine.    Additional provider notes:     Arm swelling  Patient noticed swelling of her right hand about a week ago.  Happens when she wakes up, resolves in the morning.  She tried sleeping with her hand elevated but has not tried using a wrist brace.  Is high her hand sometimes feels tingly, never has any numbness or weakness.  Swelling always resolves after she wakes up.  She has some swelling beyond the wrist but always starts in her hand, feels like it swollen to the point where it is hard to make a complete fist.  Denies any history of clotting disorder.  She did start painting around her house a few weeks ago and thought possibly an overuse injury, but took about a week off from work and that did not show any improvement in symptoms.  She is right-handed.  Sometimes blood pressure is higher in her right arm when she is having the swelling, same pressures later in the day when the swelling has resolved.    Blood pressure.  Patient weaned to 50 mg of metoprolol daily, was having symptomatic bradycardia on higher doses.  She does not have a known history of heart disease but she does have tremors.  Started " lisinopril, now on 20 mg daily.  Her blood pressure systolic today is 166, diastolic 110.  This is the highest her blood pressure has ever been.  Denies any headaches, changes in vision, chest pain, shortness of breath.  Said her sister started on a medication that controlled her blood pressure very well, unsure what it is.  Patient has taking Lasix in the past before when she had high blood pressure with good results.  She requested to trial a diuretic if possible.    Phone call start time 10:08 AM  Phone call end time 10:30 AM    Phone call duration: 22 minutes    Bailey Polanco MD

## 2021-06-09 NOTE — TELEPHONE ENCOUNTER
Refill Approved    Rx renewed per Medication Renewal Policy. Medication was last renewed on 5/18/2020.    Malia Tamez, [facility] Connection Triage/Med Refill 6/22/2020     Requested Prescriptions   Pending Prescriptions Disp Refills     hydroCHLOROthiazide (HYDRODIURIL) 12.5 MG tablet [Pharmacy Med Name: hydrochlorothiazide 12.5 mg tablet] 12 tablet 1     Sig: Take 1 tablet (12.5 mg total) by mouth daily.       Diuretics/Combination Diuretics Refill Protocol  Passed - 6/22/2020  7:57 AM        Passed - Visit with PCP or prescribing provider visit in past 12 months     Last office visit with prescriber/PCP: Visit date not found OR same dept: 12/12/2019 Comfort Zeng MD OR same specialty: 12/12/2019 Comfort Zeng MD  Last physical: Visit date not found Last MTM visit: Visit date not found   Next visit within 3 mo: Visit date not found  Next physical within 3 mo: Visit date not found  Prescriber OR PCP: Bailey Polanco MD  Last diagnosis associated with med order: 1. Essential hypertension  - hydroCHLOROthiazide (HYDRODIURIL) 12.5 MG tablet [Pharmacy Med Name: hydrochlorothiazide 12.5 mg tablet]; Take 1 tablet (12.5 mg total) by mouth daily.  Dispense: 12 tablet; Refill: 1    If protocol passes may refill for 12 months if within 3 months of last provider visit (or a total of 15 months).             Passed - Serum Potassium in past 12 months      Lab Results   Component Value Date    Potassium 4.5 10/02/2019             Passed - Serum Sodium in past 12 months      Lab Results   Component Value Date    Sodium 140 10/02/2019             Passed - Blood pressure on file in past 12 months     BP Readings from Last 1 Encounters:   05/27/20 136/86             Passed - Serum Creatinine in past 12 months      Creatinine   Date Value Ref Range Status   10/02/2019 0.71 0.60 - 1.10 mg/dL Final

## 2021-06-09 NOTE — PROGRESS NOTES
"Speech Language/Pathology  Videofluoroscopic Swallow Study     Problem:  Patient Active Problem List   Diagnosis     Hyperlipidemia     Osteoarthritis Of The Knee     Cervical Radiculopathy     Current moderate episode of major depressive disorder without prior episode (H)     Benign Essential Hypertension     Benign familial tremor     Anxiety       Onset Date: 6/16/2020 (order date)  Reason for Evaluation: Assess for oropharyngeal dysphagia  Pertinent History: Suspected GERD. Patient reports that she has been taking Prilosec 2x/day for the past three weeks.  Current Diet: Regular textures and thin liquids  Baseline Diet: Regular textures and thin liquids    Assessment:    Patient demonstrated no oral dysphagia and no significant pharyngeal dysphagia. Patient's swallow function appears WNL for her age.    Patient is at minimal aspiration risk with all intake.    Rehab potential: N/A    Recommendations:    Plan: Continue current diet of Regular textures and thin liquids    Strategies: Patient to follow standard GERD precautions. She was provided with verbal and written education on these.    Speech Therapy is not indicated at this time.    Referrals: Patient to contact [facility] for any additional recommendations. She may benefit from a GI consult.    Reviewed history of swallow problem with patient and verbally explained roles of SLP and radiologist. Verbally explained process of VFSS prior to administration of barium. Verbally explained results and recommendations to patient. SLP answered questions and stated that a copy of this report will be faxed to patient's referring provider, Dr. Balderas of [facility]. Patient verbalized understanding.    Subjective:    Patient is a 64 y/o female referred due to concerns of possible oropharyngeal and/or esophageal dysphagia. Patient reports excessive gagging, retching, and \"mouth watering\" for the past two months. This occurs in the absence of oral intake. She also states, " "\"I'll eat something and, an hour later, a piece of food with come back up. I feel like there's a pocket in there [my throat].\" Patient has also noted extensive coughing after eating or drinking. She estimates that this has been an issue for the past week or so. Patient also participated in an esophagram during this visit. Please refer to separately dictated report for details.    Patient presents as pleasant and cooperative during this evaluation.   An  was not applicable    Patient was given thin and pureed consistencies of barium.    Objective:    Dentition/Oral hygiene: Patient's dentition is grossly intact. She appears to have a lower dental partial. Oral hygiene was good.    Oral motor function was not impaired.    Bolus prep and oral control were not impaired.     Anterior-Posterior transit was not impaired.    Premature spill beyond the base of the tongue into the valleculae did not occur with either consistency.    Tongue base retraction was not impaired.    Trace oral stasis was observed with puree. This appeared related to the sticky nature of the barium. It easily cleared with a spontaneous dry swallow.    Aspiration did not occur with either consistency.     Laryngeal penetration did not occur with either consistency.    Swallow response was mildly delayed with thin liquid. This resulted in pourover past the epiglottis and nearly to the pyriform sinuses.    Epiglottic movement was complete consistently across texture trials.    Pharyngeal stasis did not occur with either consistency.    Pharyngeal constriction was not impaired.    Hyolaryngeal elevation was not impaired. Hyolaryngeal excursion was not impaired.    Cricopharyngeal function was not impaired. Patient has a mildly prominent cricopharyngeus muscle. Cervical esophageal function was not impaired.    17 dysphagia minutes    KymberlyLINDSAY Steward MA, CCC-SLP  "

## 2021-06-09 NOTE — PROGRESS NOTES
Assessment/Plan:      Visit for Preoperative Exam.   Bilateral lid leg upper eyelids  Hyperlipidemia  Hypertension  Essential tremor    Patient approved for surgery with general or local anesthesia. Labs will be done as indicated. Above recommendations were reviewed with the patient. Copy of the pre-op was given to the patient to bring along on the day of surgery. Follow up as needed. Proceed with proposed surgery without additional clinical clarifications. No Cardiology consultation or non-invasive testing. Low Risk Surgery. No active cardiac conditions. BMP and hemogram are done today.  Results will be faxed to surgery prior to this procedure.  There are no contraindications to this procedure per my examination today.    Subjective:   The patient has been having noticeable crinkling of the upper lids bilaterally.  She's noticed some sagging of the lid as well.  She is now scheduled for repair of this.    Scheduled Procedure: Blepharoplasty upper eyelids  Surgery Date:  3/20/17  Surgery Location:  [facility]  Surgeon:  Dr. Romel Weems    Current Outpatient Prescriptions   Medication Sig Dispense Refill     ALPRAZolam (XANAX) 0.25 MG tablet TAKE ONE TABLET BY MOUTH DAILY AT BEDTIME IF NEEDED FOR SLEEP  30 tablet 0     cephalexin (KEFLEX) 500 MG capsule        estradiol (CLIMARA) 0.05 mg/24 hr        FLUoxetine (PROZAC) 20 MG capsule TAKE ONE CAPSULE BY MOUTH ONE TIME DAILY **due for physical and labs** 30 capsule 0     FLUoxetine (PROZAC) 20 MG tablet Take 1 tablet (20 mg total) by mouth daily. 90 tablet 0     ibuprofen (ADVIL,MOTRIN) 600 MG tablet        metoprolol succinate (TOPROL-XL) 25 MG TAKE 1 TABLET BY MOUTH DAILY. 30 tablet 2     oxyCODONE-acetaminophen (PERCOCET) 5-325 mg per tablet        pantoprazole (PROTONIX) 40 MG tablet TAKE 1 TABLET DAILY 90 tablet 3     promethazine (PHENERGAN) 12.5 MG tablet        No current facility-administered medications for this visit.        No Known  Allergies    Immunization History   Administered Date(s) Administered     DT (pediatric) 06/13/2005     Hep A, historic 01/19/2010     Influenza, inj, historic 10/01/2013     Td, historic 06/13/2005       Patient Active Problem List   Diagnosis     Hyperlipidemia     Osteoarthritis Of The Knee     Cervical Radiculopathy     Depression     Benign Essential Hypertension     Tremor     Gastritis       No past medical history on file.    Social History     Social History     Marital status:      Spouse name: N/A     Number of children: N/A     Years of education: N/A     Occupational History     Not on file.     Social History Main Topics     Smoking status: Never Smoker     Smokeless tobacco: Never Used     Alcohol use Not on file     Drug use: Not on file     Sexual activity: Not on file     Other Topics Concern     Not on file     Social History Narrative       Past Surgical History:   Procedure Laterality Date     BREAST BIOPSY Right      HYSTERECTOMY       IA EXCIS THYROID DUCT CYST/SINUS      Description: Thyroglossal Duct Cyst Excision;  Recorded: 09/07/2008;     IA OPEN RX DISTAL RADIUS FX, EXTRA-ARTICULAR      Description: Open Treatment Of Fracture Of Distal Radius;  Recorded: 09/07/2008;     REDUCTION MAMMAPLASTY  2012       History of Present Illness  Recent Health  Fever: no  Chills: no  Fatigue: no  Chest Pain: no  Cough: no  Dyspnea: no  Urinary Frequency: no  Nausea: no  Vomiting: no  Diarrhea: no  Abdominal Pain: no  Easy Bruising: no  Lower Extremity Swelling: no    Pertinent History  Prior Anesthesia: yes  Previous Anesthesia Reaction:  no  Diabetes: no  Cardiovascular Disease: no  Pulmonary Disease: no  Renal Disease: no  GI Disease: no  Sleep Apnea: no  Thromboembolic Problems: no  Clotting Disorder: no  Bleeding Disorder: no  Transfusion Reaction: no    Social history of there are no concerns regarding care after surgery    After surgery, the patient plans to recover at home with  family.    Review of Systems  Review of Systems   Constitutional: Negative.  Negative for fatigue and fever.   HENT: Negative.  Negative for congestion.    Eyes: Negative.  Negative for visual disturbance.   Respiratory: Negative.  Negative for cough and shortness of breath.    Cardiovascular: Negative.    Gastrointestinal: Negative.    Endocrine: Negative.    Genitourinary: Negative.    Musculoskeletal:        Complains of right knee and upper leg pain   Skin: Negative.    Allergic/Immunologic: Negative.    Neurological: Negative.    Hematological: Negative.    Psychiatric/Behavioral: Negative.              Objective:         Vitals:    03/13/17 1521   BP: 112/78   Pulse: (!) 58   Resp: 16   Temp: 97.6  F (36.4  C)   SpO2: 96%   Weight: 156 lb (70.8 kg)   Height: 5' (1.524 m)       Physical Exam:  Physical Exam   Constitutional: She is oriented to person, place, and time. She appears well-developed and well-nourished. No distress.   HENT:   Right Ear: External ear normal.   Left Ear: External ear normal.   Mouth/Throat: Oropharynx is clear and moist.   Eyes: Conjunctivae and EOM are normal. Pupils are equal, round, and reactive to light.   Neck: Normal range of motion. Neck supple. No JVD present. No thyromegaly present.   Cardiovascular: Normal rate, regular rhythm and normal heart sounds.    No murmur heard.  Pulmonary/Chest: Effort normal and breath sounds normal. No respiratory distress.   Abdominal: Soft. Bowel sounds are normal. She exhibits no mass. There is no tenderness.   Musculoskeletal: Normal range of motion. She exhibits no edema.   Mild tenderness noted over the femoral tendon of the right leg.   Lymphadenopathy:     She has no cervical adenopathy.   Neurological: She is alert and oriented to person, place, and time. She has normal reflexes.   Skin: Skin is warm and dry. No rash noted.   Psychiatric: She has a normal mood and affect.

## 2021-06-10 NOTE — PROGRESS NOTES
Subjective:   Lakisha comes in with 2 concerns.  She wants to discuss cholesterol levels.  She does have a history of hyperlipidemia.  She also has a strong family history of coronary artery disease.  Presently she denies any chest pains or shortness of breath.  She tries to eat a good, well-balanced, low-cholesterol diet.  She also is here for discussion of tremors.  She has a significant tremor in her hands.  This bothers her quite a bit.  There is a strong family history of this as well.  She has been taking metoprolol for control of heart rhythms.  This really does not help her tremor too much.  She had been on propranolol in the past but it caused significant bradycardia and she had to discontinue it.  She sometimes has a hard time writing her name due to the tremor.  Sometimes the tremor gets very significantly noticeable.  She denies extremity weakness.  She denies double vision.      Objective:  HEENT: Pupils equally round and reactive to light.  Pharynx clear.  Neck: No bruits heard.  Cardiac: There is a regular rhythm present.  No ectopy present.  No murmur present today.  Lungs: Lungs are totally clear.  Neurologic: Cranial nerves all are intact.  Motor strength appears normal in upper and lower extremities.  Gait was stable.  The patient did have a resting tremor in both hands with arms outstretched.  Pulses are strong in the upper extremities.  Sensory exam totally normal in hands and feet.      Assessment:  1.  Essential tremor.  2.  Hyperlipidemia      Plan:  The patient will be started on atorvastatin 10 mg daily.  She was instructed and side effects of this.  We will recheck a cholesterol in 3 months.  She will continue on a good low-cholesterol diet.  We also will start gabapentin 300 mg daily for 3 days then increase by 300 mg every 3 days until she is taking 300 mg 3 times daily.  If this is of no benefit the patient will see the neurologist for evaluation.

## 2021-06-11 NOTE — TELEPHONE ENCOUNTER
Refill Approved    Rx renewed per Medication Renewal Policy. Medication was last renewed on 10/02/2019.  Last office visit was 06/12/2020 with PCP office.    Rosanna Daly, [facility] Triage/Med Refill 8/29/2020     Requested Prescriptions   Pending Prescriptions Disp Refills     FLUoxetine (PROZAC) 20 MG tablet [Pharmacy Med Name: fluoxetine 20 mg tablet] 180 tablet 3     Sig: Take 2 tablets (40 mg total) by mouth daily.       SSRI Refill Protocol  Passed - 8/26/2020  8:00 AM        Passed - PCP or prescribing provider visit in last year     Last office visit with prescriber/PCP: 12/12/2019 Comfort Zeng MD OR same dept: 12/12/2019 Comfort Zeng MD OR same specialty: 12/12/2019 Comfort Zeng MD  Last physical: 10/2/2019 Last MTM visit: Visit date not found   Next visit within 3 mo: Visit date not found  Next physical within 3 mo: Visit date not found  Prescriber OR PCP: Comfort Zeng MD  Last diagnosis associated with med order: 1. Current moderate episode of major depressive disorder without prior episode (H)  - FLUoxetine (PROZAC) 20 MG tablet [Pharmacy Med Name: fluoxetine 20 mg tablet]; Take 2 tablets (40 mg total) by mouth daily.  Dispense: 180 tablet; Refill: 3    If protocol passes may refill for 12 months if within 3 months of last provider visit (or a total of 15 months).

## 2021-06-11 NOTE — PROGRESS NOTES
Office Visit -[facility]    Lakisha Cooper   65 y.o. female    Date of Visit: 9/3/2020         Assessment and Plan   1. Benign familial tremor  Inherited . Father also has it   - primidone (MYSOLINE) 50 MG tablet; Take 1 tablet (50 mg total) by mouth at bedtime.  Dispense: 90 tablet; Refill: 3    2. Mixed hyperlipidemia  On a statin    3. Anxiety  Under a lot of situational stress because of her work . I do feel at this point no dose adjustment at this point . If she continues to have breakthrough anxiety with physical symptoms then consider switching to another medication .   - ALPRAZolam (XANAX) 0.25 MG tablet; TAKE 1/2 TABLET BY MOUTH daily as needed.  Dispense: 5 tablet; Refill: 0    4. Benign Essential Hypertension  Well controlled     5. Cervical Radiculopathy      6. Healthcare maintenance  Reviewed and up to date . Colon 2020 clear next in ten years .  dexa due at age 66  Immunization shingrix due   Pap ( has had a hysterectomy )  mammo annual       7. Benign paroxysmal positional vertigo, unspecified laterality  Went to vertigo clinic . Now doing much better     8. Routine history and physical examination of adult  Is due for annual labs   - Lipid [place] FASTING  - Thyroid Stimulating Hormone (TSH)  - Vitamin D, Total (25-Hydroxy)  - Comprehensive Metabolic Panel  Colon 2020 next ten years   Mammogram annual   Pap s/p hysterecomty .                History of Present Illness   This 65 y.o. old   Chief Complaint   Patient presents with     [facility]     New pt - Wants to have just 1 physicain - check up    has had a flare up of her anxiety . She attributes to her work . And even though was planning to retire later is now taking earlier MCC . Her work atmosphere has changed even though she likes her colleagues . This has lead to lack of sleep and ruminations and physical symptoms of anxiety . She has no symptoms of suicidal ideation .     Review of Systems: A comprehensive review of  systems was negative except as noted.     Medications, Allergies and Problem List   Reviewed, reconciled and updated  Patient Active Problem List   Diagnosis     Hyperlipidemia     Osteoarthritis Of The Knee     Cervical Radiculopathy     Current moderate episode of major depressive disorder without prior episode (H)     Benign Essential Hypertension     Benign familial tremor     Anxiety        Physical Exam   General Appearance:       /80 (Patient Site: Left Arm, Patient Position: Sitting, Cuff Size: Adult Large)   Pulse 87   Ht 5' (1.524 m)   Wt 145 lb (65.8 kg)   SpO2 97%   BMI 28.32 kg/m      General appearance - alert, well appearing, and in no distress  Mental status - alert, oriented to person, place, and time  Neck - supple, no significant adenopathy  Lymphatics - no palpable lymphadenopathy, no hepatosplenomegaly  Chest - clear to auscultation, no wheezes, rales or rhonchi, symmetric air entry  Heart - normal rate, regular rhythm, normal S1, S2, no murmurs, rubs, clicks or gallops  Abdomen - soft, nontender, nondistended, no masses or organomegaly  Breasts - breasts appear normal, no suspicious masses, no skin or nipple changes or axillary nodes  Musculoskeletal - no joint tenderness, deformity or swelling  Extremities - peripheral pulses normal, no pedal edema, no clubbing or cyanosis  Skin - normal coloration and turgor, no rashes, no suspicious skin lesions noted                                                                                       Additional Information   Current Outpatient Medications   Medication Sig Dispense Refill     ALPRAZolam (XANAX) 0.25 MG tablet TAKE 1/2 TABLET BY MOUTH daily as needed. 5 tablet 0     artificial tears,hypromellose, (ISOPTO TEARS) 0.5 % ophthalmic solution Apply 1 drop to eye.       atorvastatin (LIPITOR) 10 MG tablet Take 1 tablet (10 mg total) by mouth at bedtime. (Patient taking differently: Take 20 mg by mouth at bedtime. ) 90 tablet 3      FLUoxetine (PROZAC) 20 MG tablet Take 2 tablets (40 mg total) by mouth daily. 180 tablet 2     hydroCHLOROthiazide (HYDRODIURIL) 12.5 MG tablet Take 1 tablet (12.5 mg total) by mouth daily. 30 tablet 5     lisinopril (PRINIVIL,ZESTRIL) 10 MG tablet Take 2 tablets (20 mg total) by mouth daily. 180 tablet 3     omeprazole (PRILOSEC) 40 MG capsule Take 40 mg by mouth 2 (two) times a day before meals.       primidone (MYSOLINE) 50 MG tablet Take 1 tablet (50 mg total) by mouth at bedtime. 90 tablet 2     No current facility-administered medications for this visit.      No Known Allergies  Social History     Social History Narrative     Not on file      reports that she has never smoked. She has never used smokeless tobacco. No history on file for alcohol and drug.   Past Medical History:   Diagnosis Date     Anxiety and depression      Bilateral sensorineural hearing loss 5/11/2017     Gastritis 6/22/2015     Left cervical radiculopathy 10/22/2012           Time:      Stephanie Allen MD

## 2021-06-11 NOTE — PATIENT INSTRUCTIONS - HE
shingrix booster has to be given at the pharmacy . 90 % effective .    Health maintenance :   Colonoscopy due 2030  Mammogram annual  dexa scan 2021   Pneumonia boosters start at age 65

## 2021-06-13 NOTE — TELEPHONE ENCOUNTER
Refill Approved    Rx renewed per Medication Renewal Policy. Medication was last renewed on 6/22/20.    Lucrecia Dhillon, [facility] Triage/Med Refill 11/23/2020     Requested Prescriptions   Pending Prescriptions Disp Refills     hydroCHLOROthiazide (HYDRODIURIL) 12.5 MG tablet [Pharmacy Med Name: hydrochlorothiazide 12.5 mg tablet] 30 tablet 5     Sig: Take 1 tablet (12.5 mg total) by mouth daily.       Diuretics/Combination Diuretics Refill Protocol  Passed - 11/23/2020  8:00 AM        Passed - Visit with PCP or prescribing provider visit in past 12 months     Last office visit with prescriber/PCP: Visit date not found OR same dept: 12/12/2019 Comfort Zeng MD OR same specialty: 12/12/2019 Comfort Zeng MD  Last physical: Visit date not found Last MTM visit: Visit date not found   Next visit within 3 mo: Visit date not found  Next physical within 3 mo: Visit date not found  Prescriber OR PCP: Bailey Polanco MD  Last diagnosis associated with med order: 1. Essential hypertension  - hydroCHLOROthiazide (HYDRODIURIL) 12.5 MG tablet [Pharmacy Med Name: hydrochlorothiazide 12.5 mg tablet]; Take 1 tablet (12.5 mg total) by mouth daily.  Dispense: 30 tablet; Refill: 5    If protocol passes may refill for 12 months if within 3 months of last provider visit (or a total of 15 months).             Passed - Serum Potassium in past 12 months      Lab Results   Component Value Date    Potassium 4.8 09/03/2020             Passed - Serum Sodium in past 12 months      Lab Results   Component Value Date    Sodium 140 09/03/2020             Passed - Blood pressure on file in past 12 months     BP Readings from Last 1 Encounters:   09/03/20 132/80             Passed - Serum Creatinine in past 12 months      Creatinine   Date Value Ref Range Status   09/03/2020 0.72 0.60 - 1.10 mg/dL Final

## 2021-06-14 NOTE — TELEPHONE ENCOUNTER
RN cannot approve Refill Request    RN can NOT refill this medication PCP to clarify dose. Last office visit: 12/12/2019 Comfort Zeng MD Last Physical: 10/2/2019 Last MTM visit: Visit date not found Last visit same specialty: 12/12/2019 Comfort Zeng MD.  Next visit within 3 mo: Visit date not found  Next physical within 3 mo: Visit date not found      Janell Guan, [facility] Triage/Med Refill 1/21/2021    Requested Prescriptions   Pending Prescriptions Disp Refills     atorvastatin (LIPITOR) 10 MG tablet [Pharmacy Med Name: atorvastatin 10 mg tablet] 90 tablet 3     Sig: Take 1 tablet (10 mg total) by mouth at bedtime.       Statins Refill Protocol (Hmg CoA Reductase Inhibitors) Passed - 1/21/2021  8:00 AM        Passed - PCP or prescribing provider visit in past 12 months      Last office visit with prescriber/PCP: 12/12/2019 Comfort Zeng MD OR same dept: Visit date not found OR same specialty: 12/12/2019 Comfort Zeng MD  Last physical: 10/2/2019 Last MTM visit: Visit date not found   Next visit within 3 mo: Visit date not found  Next physical within 3 mo: Visit date not found  Prescriber OR PCP: Comfort Zeng MD  Last diagnosis associated with med order: 1. Mixed hyperlipidemia  - atorvastatin (LIPITOR) 10 MG tablet [Pharmacy Med Name: atorvastatin 10 mg tablet]; Take 1 tablet (10 mg total) by mouth at bedtime.  Dispense: 90 tablet; Refill: 3    If protocol passes may refill for 12 months if within 3 months of last provider visit (or a total of 15 months).

## 2021-06-14 NOTE — TELEPHONE ENCOUNTER
Spoke to Lakisha.  She would agree to the increase of atorvastatin to 20 mg.  Please send new medication for her.

## 2021-06-15 NOTE — PROGRESS NOTES
Subjective:   Lakisha comes in today for check of her blood pressure and cholesterol.  She is feeling fairly well but has noticed that her blood pressures been higher when she checks it at home.  She has been under a lot more stress recently as they just sold their home in by a cabin which needs remodeling.  She also is caring for her elderly parents which is stressful.  She denies chest pain.  She denies dizziness or headaches.  She seems to follow a pretty good diet in terms of low cholesterol.  She has no side effects to any of her medications.  She is on primidone for her tremor and that seems to help her quite a bit.        Objective:  HEENT: Fundi are benign.  Pharynx clear.  Neck: No increased JVD noted.  No bruits heard.  Lungs: Lungs are clear.  Patient is in no respiratory distress.  Cardiac: There is a regular rhythm present.  No ectopy heard.  No murmur present.  Extremities: No edema noted today.        Assessment:  1.  Hypertension which appears to be in good control today.  2.  Hyperlipidemia  3.  Familial tremor      Plan:  Continue all present medications.  Comprehensive metabolic panel and lipid cascade are done today.  Patient will be called with abnormalities.  Follow-up here as needed.

## 2021-06-16 PROBLEM — Z00.00 HEALTH CARE MAINTENANCE: Status: ACTIVE | Noted: 2020-09-03

## 2021-06-16 PROBLEM — F41.9 ANXIETY: Status: ACTIVE | Noted: 2019-02-08

## 2021-06-16 NOTE — PROGRESS NOTES
Preoperative Exam    Scheduled Procedure: Liposuction    Surgery Date:  3/5/2018  Surgery Location: [facility]     Surgeon:  Dr. Romel Weems    Assessment/Plan:     1.  Preop H&P for liposuction surgery  2.  Hyperlipidemia  3.  History of hypertension  4.  Osteoarthritis knees    Surgical Procedure Risk: Low (reported cardiac risk generally < 1%)  Have you had prior anesthesia?: Yes  Have you or any family members had a previous anesthesia reaction:  No  Do you or any family members have a history of a clotting or bleeding disorder?: No  Cardiac Risk Assessment: no increased risk for major cardiac complications    Patient approved for surgery with general or local anesthesia.      Functional Status: Independent  Patient plans to recover at home with family.     Subjective:      Lakisha Cooper is a 62 y.o. female who presents for a preoperative consultation.  She has had concerns with fat deposition in her thighs.  She is now scheduled for liposuction of this area to remove this.  She does not complain of any recent fevers.  She has no significant congestion or cough.  She has never reacted to any anesthesia in the past.    All other systems reviewed and are negative, other than those listed in the HPI.    Pertinent History  Do you have difficulty breathing or chest pain after walking up a flight of stairs: No  History of obstructive sleep apnea: No  Steroid use in the last 6 months: No  Frequent Aspirin/NSAID use: No  Prior Blood Transfusion: No  Prior Blood Transfusion Reaction: No  If for some reason prior to, during or after the procedure, if it is medically indicated, would you be willing to have a blood transfusion?:  There is no transfusion refusal.    Current Outpatient Prescriptions   Medication Sig Dispense Refill     ALPRAZolam (XANAX) 0.25 MG tablet TAKE ONE TABLET BY MOUTH ONE TIME DAILY AT BEDTIME IF NEEDED FOR SLEEP 30 tablet 0     atorvastatin (LIPITOR) 10 MG tablet Take 1 tablet (10  mg total) by mouth at bedtime. 90 tablet 3     cephalexin (KEFLEX) 500 MG capsule        ibuprofen (ADVIL,MOTRIN) 600 MG tablet        metoprolol succinate (TOPROL-XL) 25 MG TAKE 1 TABLET BY MOUTH DAILY 90 tablet 1     pantoprazole (PROTONIX) 40 MG tablet TAKE 1 TABLET DAILY 90 tablet 1     primidone (MYSOLINE) 50 MG tablet Take 1 tablet (50 mg total) by mouth at bedtime. 90 tablet 3     estradiol (CLIMARA) 0.05 mg/24 hr        FLUoxetine (PROZAC) 20 MG capsule TAKE 1 CAPSULE BY MOUTH TWICE DAILY 60 capsule 5     gabapentin (NEURONTIN) 300 MG capsule Take 1 capsule (300 mg total) by mouth 3 (three) times a day. 90 capsule 3     oxyCODONE-acetaminophen (PERCOCET) 5-325 mg per tablet        promethazine (PHENERGAN) 12.5 MG tablet        No current facility-administered medications for this visit.         No Known Allergies    Patient Active Problem List   Diagnosis     Hyperlipidemia     Osteoarthritis Of The Knee     Cervical Radiculopathy     Depression     Benign Essential Hypertension     Tremor     Gastritis       No past medical history on file.    Past Surgical History:   Procedure Laterality Date     BREAST BIOPSY Right      HYSTERECTOMY       OOPHORECTOMY       DE EXCIS THYROID DUCT CYST/SINUS      Description: Thyroglossal Duct Cyst Excision;  Recorded: 09/07/2008;     DE OPEN RX DISTAL RADIUS FX, EXTRA-ARTICULAR      Description: Open Treatment Of Fracture Of Distal Radius;  Recorded: 09/07/2008;     REDUCTION MAMMAPLASTY  2012       Social History     Social History     Marital status:      Spouse name: N/A     Number of children: N/A     Years of education: N/A     Occupational History     Not on file.     Social History Main Topics     Smoking status: Never Smoker     Smokeless tobacco: Never Used     Alcohol use Not on file     Drug use: Not on file     Sexual activity: Not on file     Other Topics Concern     Not on file     Social History Narrative       Patient Care Team:  Boris Sabillon,  MD as PCP - General          Objective:     Vitals:    02/28/18 1425   BP: 138/80   Pulse: (!) 51   Resp: 16   Temp: 97.6  F (36.4  C)   TempSrc: Oral   SpO2: 94%   Weight: 148 lb (67.1 kg)   Height: 5' (1.524 m)         Physical Exam:  HEENT: Pupils equally round and reactive to light.  Fundi are benign.  Conjunctiva clear.  Both TMs are gray.  Nasal mucosa clear.  Sinuses nontender.  Pharynx is clear.  Neck: No lymphadenopathy or thyromegaly present.  No increased JVD noted.  Lungs: Lungs today are clear bilaterally.  Patient is in no respiratory distress.  Cardiac: There is a regular rhythm present.  No ectopy heard.  No murmur present.  Abdomen: Abdomen is soft and nontender.  Bowel sounds are active.  No hepatomegaly present.  No rebound, guarding or rigidity present.  Musculoskeletal: Lipomatous tissue noted over the lateral thighs bilaterally.  No edema noted in the legs.  Pulses strong in upper and lower extremities.    There are no Patient Instructions on file for this visit.    EKG: Please see EKG copy.  No acute changes noted on EKG.  No ischemia or injury noted.    Labs:  Labs pending at this time.  Results will be reviewed when available.    Immunization History   Administered Date(s) Administered     DT (pediatric) 06/13/2005     Hep A, Adult IM (19yr & older) 06/13/2005     Hep A, historic 01/19/2010     Influenza, inj, historic,unspecified 10/01/2013     Td,adult,historic,unspecified 06/13/2005     Tdap 01/08/2015     Typhoid, Inj, Inactive 01/08/2015           Electronically signed by Boris Sabillon MD 02/28/18 2:28 PM

## 2021-06-17 NOTE — TELEPHONE ENCOUNTER
Refill Approved    Rx renewed per Medication Renewal Policy. Medication was last renewed on 8/29/20, last OV 9/3/20.    Jenn Fine, [facility] Triage/Med Refill 5/23/2021     Requested Prescriptions   Pending Prescriptions Disp Refills     FLUoxetine (PROZAC) 20 MG tablet [Pharmacy Med Name: fluoxetine 20 mg tablet] 180 tablet 2     Sig: Take 2 tablets (40 mg total) by mouth daily.       SSRI Refill Protocol  Passed - 5/21/2021  8:00 AM        Passed - PCP or prescribing provider visit in last year     Last office visit with prescriber/PCP: 12/12/2019 Comfort Zeng MD OR same dept: Visit date not found OR same specialty: 12/12/2019 Comfort Zeng MD  Last physical: 10/2/2019 Last MTM visit: Visit date not found   Next visit within 3 mo: Visit date not found  Next physical within 3 mo: Visit date not found  Prescriber OR PCP: Comfort Zeng MD  Last diagnosis associated with med order: 1. Current moderate episode of major depressive disorder without prior episode (H)  - FLUoxetine (PROZAC) 20 MG tablet [Pharmacy Med Name: fluoxetine 20 mg tablet]; Take 2 tablets (40 mg total) by mouth daily.  Dispense: 180 tablet; Refill: 2    If protocol passes may refill for 12 months if within 3 months of last provider visit (or a total of 15 months).

## 2021-06-18 NOTE — PROGRESS NOTES
Subjective:   Lakisha comes in today with excessive tiredness.  Over the last 6 weeks she has noticed a lot of fatigue.  If she sits to read or watch a movie she falls right asleep.  She has caught herself falling asleep at her desk at work as well.  She thinks she sleeps well.  She actually has been getting excessive sleep some nights and that does not help her fatigue.  She has not been aerobically active over the last year due to plantar fasciitis.  This has limited her activity.  She denies polyuria or polydipsia.  She denies excessive swelling in her legs.  She has had no bowel or bladder concerns at all.  She denies any blood loss or black stools.  She does not have a history of thyroid problems.  She has had no chest pain or shortness of breath.  She denies fevers, sweats or chills.  Her appetite is been good.  She thinks she follows a good diet.  She does not think she snores much at night but when she has been on work trips and shares a room with a colleague she has been told that she stops breathing at nighttime.      Objective:  HEENT: Pupils equally round and reactive to light.  Conjunctiva clear.  TMs are gray.  Sinuses nontender.  Pharynx is clear.  Neck: No thyromegaly present.  Lungs: Lungs are clear.  Patient is in no respiratory distress.  Cardiac: There is a regular rhythm present.  No ectopy or murmur heard.  Abdomen: No suprapubic tenderness noted.  No hepatomegaly present.  Bowel sounds are active throughout.  Musculoskeletal: No edema noted in the feet or legs today.  Back reveals no CVA tenderness.  Dermatologic: No skin changes noted.      Assessment:  1.  Excessive fatigue.  Rule out metabolic causes such as hypothyroidism or anemia or diabetes.  Rule out sleep apnea      Plan:  Workup for fatigue done today.  If workup is negative patient will schedule a sleep study.  We discussed aerobic activity.  She is unable to walk or run secondary to her foot pain.  She will try bicycling.  She will  be called with abnormal blood workup.  Follow-up here if any new symptoms arise.

## 2021-06-20 ENCOUNTER — HEALTH MAINTENANCE LETTER (OUTPATIENT)
Age: 66
End: 2021-06-20

## 2021-06-21 NOTE — PROGRESS NOTES
Subjective:   Lakisha comes in because of pain in her hands.  She does have a history of arthritis.  She states her father had severe arthritis.  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  Her hands are mainly involved.  She gets pain in the distal joints of the second and third fingers of both hands.  Left hand seems worse than the right.  She denies other trauma to the hands.  Other joints in the hands are not involved that much.  Musculoskeletal: The hands were evaluated.  There were degenerative changes      Objective:  And bony nodules noted over the DIP joints of the second and third fingers of both hands.  PIP joints were less involved.  MCP areas were minimally tender.  Other joints in the body including the elbows, shoulders, hips and knees had minimal pain or tenderness.  No effusions noted.      Assessment:  1.  Chronic arthritis of the hands most consistent with osteoarthritis      Plan:  Patient will have lab work done to rule out other causes of arthritis other than osteoarthritis.  She will be called with abnormalities.  We will have her seen by rheumatology for further evaluation.  Try to limit overuse of the hands.  Use anti-inflammatories as needed.

## 2021-06-22 NOTE — TELEPHONE ENCOUNTER
Refill Approved    Rx renewed per Medication Renewal Policy. Medication was last renewed on 6.20.18.    Pooja Mariano, [facility] Triage/Med Refill 1/1/2019     Requested Prescriptions   Pending Prescriptions Disp Refills     FLUoxetine (PROZAC) 20 MG capsule [Pharmacy Med Name: FLUOXETINE HCL 20 MG CAPSULE] 60 capsule 5     Sig: TAKE 1 CAPSULE BY MOUTH TWICE A DAY    SSRI Refill Protocol  Passed - 1/1/2019  8:52 AM       Passed - PCP or prescribing provider visit in last year    Last office visit with prescriber/PCP: 10/30/2018 Boris Sabillon MD OR same dept: 10/30/2018 Boris Sabillon MD OR same specialty: 10/30/2018 Boris Sabillon MD  Last physical: 2/28/2018 Last MTM visit: Visit date not found   Next visit within 3 mo: Visit date not found  Next physical within 3 mo: Visit date not found  Prescriber OR PCP: Boris Sabillon MD  Last diagnosis associated with med order: There are no diagnoses linked to this encounter.  If protocol passes may refill for 12 months if within 3 months of last provider visit (or a total of 15 months).

## 2021-06-23 NOTE — TELEPHONE ENCOUNTER
Please see request from [facility].  When completed. [facility] will fax to them.  Thanks.

## 2021-06-23 NOTE — TELEPHONE ENCOUNTER
Name of form/paperwork: Other:  Pre op Please add the statement to pre -op that states patient is cleared or ok for surgery.  Then fax .   Have you been seen for this request: Yes:  .  Do we have the form: Yes- in chart  When is form needed by: Today as surgery is 630 am prep for Monday   How would you like the form returned: Fax  Fax Number: [phone]  Patient Notified form requests are processed in 3-5 business days: No  (If patient needs form sooner, please note that in this message.)  Okay to leave a detailed message? Yes [phone]

## 2021-06-23 NOTE — TELEPHONE ENCOUNTER
Refill Approved    Rx renewed per Medication Renewal Policy. Medication was last renewed on 1/3/18.    Lucrecia Dhillon, [facility] Connection Triage/Med Refill 1/28/2019     Requested Prescriptions   Pending Prescriptions Disp Refills     atorvastatin (LIPITOR) 10 MG tablet 90 tablet 3     Sig: Take 1 tablet (10 mg total) by mouth at bedtime.    Statins Refill Protocol (Hmg CoA Reductase Inhibitors) Passed - 1/28/2019 10:29 AM       Passed - PCP or prescribing provider visit in past 12 months     Last office visit with prescriber/PCP: Visit date not found OR same dept: 10/30/2018 Boris Sabillon MD OR same specialty: 10/30/2018 Boris Sabillon MD  Last physical: Visit date not found Last MTM visit: Visit date not found   Next visit within 3 mo: Visit date not found  Next physical within 3 mo: Visit date not found  Prescriber OR PCP: Comfort Zeng MD  Last diagnosis associated with med order: There are no diagnoses linked to this encounter.  If protocol passes may refill for 12 months if within 3 months of last provider visit (or a total of 15 months).

## 2021-06-23 NOTE — PROGRESS NOTES
Preoperative Exam    Scheduled Procedure: Cataract surgery of right eye  Surgery Date:  2/11/19  Surgery Location: [facility], [place], fax [phone]    Surgeon:  Dr Calvo    Assessment/Plan:     Lakisha was seen today for pre-op exam.    Pre-op exam      Surgical Procedure Risk: Low (reported cardiac risk generally < 1%)  Have you had prior anesthesia?: Yes  Have you or any family members had a previous anesthesia reaction:  No  Do you or any family members have a history of a clotting or bleeding disorder?: No  Cardiac Risk Assessment: no increased risk for major cardiac complications    Patient approved for surgery with general or local anesthesia.    Please Note:  none    Functional Status: Independent  Patient plans to recover at home with family.     Subjective:      Lakisha Cooper is a 63 y.o. female who presents for a preoperative consultation for cataract surgery. She is in her normal state of health.    All other systems reviewed and are negative, other than those listed in the HPI.    Pertinent History  Do you have difficulty breathing or chest pain after walking up a flight of stairs: No  History of obstructive sleep apnea: No  Steroid use in the last 6 months: No  Frequent Aspirin/NSAID use: No  Prior Blood Transfusion: No  Prior Blood Transfusion Reaction: No  If for some reason prior to, during or after the procedure, if it is medically indicated, would you be willing to have a blood transfusion?:  There is no transfusion refusal.    Current Outpatient Medications   Medication Sig Dispense Refill     ALPRAZolam (XANAX) 0.25 MG tablet TAKE ONE TABLET BY MOUTH ONE TIME DAILY AT BEDTIME IF NEEDED FOR SLEEP 30 tablet 0     atorvastatin (LIPITOR) 10 MG tablet Take 1 tablet (10 mg total) by mouth at bedtime. 90 tablet 0     FLUoxetine (PROZAC) 20 MG capsule TAKE 1 CAPSULE BY MOUTH TWICE A DAY (Patient taking differently: Take 20 mg by mouth daily. TAKE 1 CAPSULE BY MOUTH TWICE A DAY      )  180 capsule 1     pantoprazole (PROTONIX) 40 MG tablet TAKE 1 TABLET DAILY 90 tablet 3     primidone (MYSOLINE) 50 MG tablet Take 1 tablet (50 mg total) by mouth at bedtime. 90 tablet 0     ibuprofen (ADVIL,MOTRIN) 600 MG tablet        metoprolol succinate (TOPROL XL) 50 MG 24 hr tablet Take 1 tablet (50 mg total) by mouth daily. 90 tablet 3     No current facility-administered medications for this visit.         No Known Allergies    Patient Active Problem List   Diagnosis     Hyperlipidemia     Osteoarthritis Of The Knee     Cervical Radiculopathy     Depression     Benign Essential Hypertension     Tremor     Gastritis       No past medical history on file.    Past Surgical History:   Procedure Laterality Date     BREAST BIOPSY Right     yrs ago  benign     BREAST BIOPSY Right      HYSTERECTOMY       OOPHORECTOMY       OR EXCIS THYROID DUCT CYST/SINUS      Description: Thyroglossal Duct Cyst Excision;  Recorded: 09/07/2008;     OR OPEN RX DISTAL RADIUS FX, EXTRA-ARTICULAR      Description: Open Treatment Of Fracture Of Distal Radius;  Recorded: 09/07/2008;     REDUCTION MAMMAPLASTY  2012       Social History     Socioeconomic History     Marital status:      Spouse name: Not on file     Number of children: Not on file     Years of education: Not on file     Highest education level: Not on file   Social Needs     Financial resource strain: Not on file     Food insecurity - worry: Not on file     Food insecurity - inability: Not on file     Transportation needs - medical: Not on file     Transportation needs - non-medical: Not on file   Occupational History     Not on file   Tobacco Use     Smoking status: Never Smoker     Smokeless tobacco: Never Used   Substance and Sexual Activity     Alcohol use: Not on file     Drug use: Not on file     Sexual activity: Not on file   Other Topics Concern     Not on file   Social History Narrative     Not on file       Patient Care Team:  Comfort Zeng MD as PCP - General  (Family Medicine)          Objective:     Vitals:    02/07/19 1423   BP: 126/74   Pulse: 64   Resp: 20   Temp: 98.3  F (36.8  C)   TempSrc: Oral   SpO2: 96%   Weight: 147 lb 12 oz (67 kg)   Height: 5' (1.524 m)         Physical Exam:  Gen: The patient appears well, in NAD.    HEENT: PERRL, EOMI. Oral mucosa is moist and pink, normal dentition.    Neck: supple. No adenopathy or thyromegaly.    Resp: Lungs are clear, good air entry, no wheezes, rhonchi or rales.    CV: S1 and S2 normal, no murmurs, regular rate and rhythm.    Musculoskeletal: Normal gait and strength.     Neuro: no gross focal deficits, alert and oriented    Psych: affect is bright and appropriate      There are no Patient Instructions on file for this visit.      Labs:  None drawn today- not required for cataract surgery    Immunization History   Administered Date(s) Administered     DT (pediatric) 06/13/2005     Hep A, Adult IM (19yr & older) 06/13/2005     Hep A, historic 01/19/2010     Influenza, inj, historic,unspecified 10/01/2013     Td,adult,historic,unspecified 06/13/2005     Tdap 01/08/2015     Typhoid, Inj, Inactive 01/08/2015           Electronically signed by Comfort Zeng MD 02/07/19 2:27 PM

## 2021-06-23 NOTE — PROGRESS NOTES
"KEIKO Cooper is a 63 y.o. female here for a pre op visit for cataract surgery (see other note), but would also like to discuss her anxiety and depression today.    She is very depressed right now and feels that her job is the trigger.  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 She takes fluoxetine 20 mg at bedtime but thinks she might need more. She also takes \"bites\" of Xanax occasionally at work, maybe once or twice per week. She has 0.25 mg tabs and she will break them in half and take half of that.     Her essential tremor is making her feel bad because she gets shaky hands and can't do things like place IVs. Anxiety makes it worse.  She takes primidone at night but it does not stop the tremor completely.    Checks BP and it's 150-160s for systolic when she's at work. Thinks she needs to increase BP medication.   Past Medical History:   Diagnosis Date     Anxiety and depression      Current Outpatient Medications on File Prior to Visit   Medication Sig Dispense Refill     ALPRAZolam (XANAX) 0.25 MG tablet TAKE ONE TABLET BY MOUTH ONE TIME DAILY AT BEDTIME IF NEEDED FOR SLEEP 30 tablet 0     atorvastatin (LIPITOR) 10 MG tablet Take 1 tablet (10 mg total) by mouth at bedtime. 90 tablet 2     FLUoxetine (PROZAC) 20 MG capsule TAKE 1 CAPSULE BY MOUTH TWICE A DAY (Patient taking differently: Take 20 mg by mouth daily. TAKE 1 CAPSULE BY MOUTH TWICE A DAY      ) 180 capsule 1     metoprolol succinate (TOPROL-XL) 25 MG Take 1 tablet (25 mg total) by mouth daily. 90 tablet 2     pantoprazole (PROTONIX) 40 MG tablet TAKE 1 TABLET DAILY 90 tablet 3     primidone (MYSOLINE) 50 MG tablet Take 1 tablet (50 mg total) by mouth at bedtime. 90 tablet 0     ibuprofen (ADVIL,MOTRIN) 600 MG tablet        [DISCONTINUED] cephalexin (KEFLEX) 500 MG capsule        [DISCONTINUED] estradiol (CLIMARA) 0.05 mg/24 hr        [DISCONTINUED] oxyCODONE-acetaminophen (PERCOCET) 5-325 mg per tablet        [DISCONTINUED] promethazine " (PHENERGAN) 12.5 MG tablet        No current facility-administered medications on file prior to visit.        Past medical and social history reviewed with no changes.   ?  ROS:   Head: No headaches    CV: No chest pain or palpitations.  Resp: No shortness of breath or cough.   ?  O  /74   Pulse 64   Temp 98.3  F (36.8  C) (Oral)   Resp 20   Ht 5' (1.524 m)   Wt 147 lb 12 oz (67 kg)   SpO2 96% Comment: RA  Breastfeeding? No   BMI 28.86 kg/m     Vitals reviewed. Nursing note reviewed.  General Appearance: Pleasant and alert, in no acute distress, though tearful at times when talking about depression and anxiety  HEENT: mucous membranes moist, neck supple, no lymphadenopathy  CV: RRR, no murmur, rubs, gallops  Resp: No respiratory distress. Clear to auscultation bilaterally. No wheezes, rales, rhonchi  Skin: warm, dry, intact, no rash noted  Neuro: no focal deficits, CNs II-XII normal.   A/P  Lakisha was seen today for pre-op exam.    Diagnoses and all orders for this visit:    Pre-op exam: see other note     Current moderate episode of major depressive disorder without prior episode (H): She will increase her Prozac to 40 mg at bedtime.  She feels that once she can retire in about 18 months, her depression and anxiety will be dramatically better.    Anxiety: I explained that do not typically prescribe benzodiazepines due to the risks of dependence and addiction and cognitive slowing over time.  She never takes this when she is not at work and believes that she will not needed when she retires.  She also hopes that when she increases her Prozac she will not need it.  Advised her to try to take it as little as possible.      Benign essential hypertension: Blood pressure is normal today, though she has had many elevated readings when she checks it at work.  We will increase her metoprolol succinate and this will hopefully help with her tremor as well.  -     metoprolol succinate (TOPROL XL) 50 MG 24 hr  tablet; Take 1 tablet (50 mg total) by mouth daily.    Benign familial tremor:     Mixed hyperlipidemia: refilled atorvastatin per pt request.   -     atorvastatin (LIPITOR) 10 MG tablet; Take 1 tablet (10 mg total) by mouth at bedtime.    Return for recheck in 6 weeks.    Options for treatment and follow-up care were reviewed with the patient and/or guardian. Lakisha Cooper and/or guardian engaged in the decision making process and verbalized understanding of the options discussed and agreed with the final plan.    Comfort Zeng MD

## 2021-06-23 NOTE — TELEPHONE ENCOUNTER
RN cannot approve Refill Request    RN can NOT refill this medication med is not covered by policy/route to provider. Last office visit: Visit date not found Last Physical: Visit date not found Last MTM visit: Visit date not found Last visit same specialty: 10/30/2018 Boris Sabillon MD.  Next visit within 3 mo: Visit date not found  Next physical within 3 mo: Visit date not found      Macie Mueller, [facility] Triage/Med Refill 1/25/2019    Requested Prescriptions   Pending Prescriptions Disp Refills     primidone (MYSOLINE) 50 MG tablet 90 tablet 0     Sig: Take 1 tablet (50 mg total) by mouth at bedtime.    There is no refill protocol information for this order

## 2021-07-13 ENCOUNTER — RECORDS - HEALTHEAST (OUTPATIENT)
Dept: ADMINISTRATIVE | Facility: CLINIC | Age: 66
End: 2021-07-13

## 2021-07-21 ENCOUNTER — RECORDS - HEALTHEAST (OUTPATIENT)
Dept: ADMINISTRATIVE | Facility: CLINIC | Age: 66
End: 2021-07-21

## 2021-08-16 ENCOUNTER — OFFICE VISIT (OUTPATIENT)
Dept: INTERNAL MEDICINE | Facility: CLINIC | Age: 66
End: 2021-08-16
Payer: COMMERCIAL

## 2021-08-16 VITALS
HEART RATE: 84 BPM | TEMPERATURE: 97.8 F | SYSTOLIC BLOOD PRESSURE: 118 MMHG | BODY MASS INDEX: 29.25 KG/M2 | WEIGHT: 149 LBS | OXYGEN SATURATION: 97 % | HEIGHT: 60 IN | DIASTOLIC BLOOD PRESSURE: 78 MMHG

## 2021-08-16 DIAGNOSIS — M81.0 AGE-RELATED OSTEOPOROSIS WITHOUT CURRENT PATHOLOGICAL FRACTURE: Primary | ICD-10-CM

## 2021-08-16 DIAGNOSIS — F32.1 CURRENT MODERATE EPISODE OF MAJOR DEPRESSIVE DISORDER WITHOUT PRIOR EPISODE (H): ICD-10-CM

## 2021-08-16 DIAGNOSIS — R42 VERTIGO: ICD-10-CM

## 2021-08-16 PROCEDURE — 99214 OFFICE O/P EST MOD 30 MIN: CPT | Performed by: INTERNAL MEDICINE

## 2021-08-16 RX ORDER — VENLAFAXINE 37.5 MG/1
37.5 TABLET ORAL 2 TIMES DAILY
Qty: 90 TABLET | Refills: 1 | Status: SHIPPED | OUTPATIENT
Start: 2021-08-16 | End: 2021-10-26

## 2021-08-16 RX ORDER — DIAZEPAM 2 MG
2 TABLET ORAL EVERY 6 HOURS PRN
Qty: 18 TABLET | Refills: 1 | Status: SHIPPED | OUTPATIENT
Start: 2021-08-16 | End: 2021-11-23

## 2021-08-16 RX ORDER — NORTRIPTYLINE HCL 10 MG
CAPSULE ORAL
COMMUNITY
Start: 2021-07-27 | End: 2021-08-16

## 2021-08-16 RX ORDER — FLUOXETINE 20 MG/1
40 TABLET ORAL DAILY
COMMUNITY
Start: 2021-05-23 | End: 2021-08-16

## 2021-08-16 ASSESSMENT — MIFFLIN-ST. JEOR: SCORE: 1142.36

## 2021-08-16 NOTE — PROGRESS NOTES
Assess/plan  1. Current moderate episode of major depressive disorder without prior episode (H)      2. Age-related osteoporosis without current pathological fracture    - DEXA HIP/PELVIS/SPINE - Future; Future  - DEXA HIP/PELVIS/SPINE - Future    3. Vertigo  Trial of effexor for dythymic symptoms . Trial of valium for severe vertigo .  Is seeing ENT again . I would be concerned about meniers disease .  - venlafaxine (EFFEXOR) 37.5 MG tablet; Take 1 tablet (37.5 mg) by mouth 2 times daily  Dispense: 90 tablet; Refill: 1  - diazepam (VALIUM) 2 MG tablet; Take 1 tablet (2 mg) by mouth every 6 hours as needed for anxiety  Dispense: 18 tablet; Refill: 1      Helen Banuelos is a 66 year old who presents for the following health issues ongoing vertigo   Recurring for the past few months   Severe episodes she  throws up and cannot  move her head . One episode she called paramedics   Also gets really anxious    retired May 1 and was hoping to feel better but instead is getting more sick . Total of  5 'big' ones and has had some minor ones when she takes meclizine    the only trigger is barometric pressure   The room starts spinning and feels the closet is going round and round .. She cannot function . Lasts 3 hours . He has to close her eye . Sometimes the meclizine helps     Didn't drive for a month . Because she was too scare . 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 Neurologist oput her on nortryptilline   Had brain MRI in jan 2021   Other health maintenance    Colon 2020   mammo annual   Due for pneumovoax     Review of Systems   Constitutional, HEENT, cardiovascular, pulmonary, gi and gu systems are negative, except as otherwise noted.      Objective    /78 (BP Location: Left arm, Patient Position: Sitting, Cuff Size: Adult Small)   Pulse 84   Temp 97.8  F  (36.6  C)   Ht 1.524 m (5')   Wt 67.6 kg (149 lb)   SpO2 97%   BMI 29.10 kg/m    Body mass index is 29.1 kg/m .  Physical Exam   GENERAL: healthy, alert and no distress  NECK: no adenopathy, no asymmetry, masses, or scars and thyroid normal to palpation  RESP: lungs clear to auscultation - no rales, rhonchi or wheezes  CV: regular rate and rhythm, normal S1 S2, no S3 or S4, no murmur, click or rub, no peripheral edema and peripheral pulses strong  ABDOMEN: soft, nontender, no hepatosplenomegaly, no masses and bowel sounds normal  MS: no gross musculoskeletal defects noted, no edema    Neuro:   rombergs active normal power lower extremities upper extremities no past-pointing no tremor no nystagmus reflexes symmetrical.

## 2021-08-17 DIAGNOSIS — I10 ESSENTIAL HYPERTENSION, BENIGN: ICD-10-CM

## 2021-08-17 DIAGNOSIS — I10 ESSENTIAL HYPERTENSION: ICD-10-CM

## 2021-08-17 RX ORDER — LISINOPRIL 10 MG/1
TABLET ORAL
Qty: 180 TABLET | Refills: 2 | Status: SHIPPED | OUTPATIENT
Start: 2021-08-17 | End: 2022-05-05

## 2021-08-17 RX ORDER — HYDROCHLOROTHIAZIDE 12.5 MG/1
TABLET ORAL
Qty: 90 TABLET | Refills: 2 | Status: SHIPPED | OUTPATIENT
Start: 2021-08-17 | End: 2022-05-05

## 2021-09-22 ENCOUNTER — ANCILLARY PROCEDURE (OUTPATIENT)
Dept: BONE DENSITY | Facility: CLINIC | Age: 66
End: 2021-09-22
Attending: INTERNAL MEDICINE
Payer: COMMERCIAL

## 2021-09-22 PROCEDURE — 77080 DXA BONE DENSITY AXIAL: CPT | Mod: 26 | Performed by: INTERNAL MEDICINE

## 2021-09-25 DIAGNOSIS — E78.2 MIXED HYPERLIPIDEMIA: ICD-10-CM

## 2021-09-26 RX ORDER — ATORVASTATIN CALCIUM 20 MG/1
TABLET, FILM COATED ORAL
Qty: 30 TABLET | Refills: 0 | Status: SHIPPED | OUTPATIENT
Start: 2021-09-26 | End: 2021-10-26

## 2021-09-26 NOTE — TELEPHONE ENCOUNTER
"Last Written Prescription Date:  1/22/21  Last Fill Quantity: 30,  # refills: 5   Last office visit provider:  8/16/21     Routing refill request to provider for review/approval because:  Rx was not specifically addressed in most recent OV.  RN unable to renew.          Requested Prescriptions   Pending Prescriptions Disp Refills     atorvastatin (LIPITOR) 20 MG tablet [Pharmacy Med Name: atorvastatin 20 mg tablet] 30 tablet 0     Sig: Take 1 tablet (20 mg total) by mouth daily.       Statins Protocol Failed - 9/25/2021 11:50 AM        Failed - LDL on file in past 12 months     Recent Labs   Lab Test 09/03/20  1339                Passed - No abnormal creatine kinase in past 12 months     No lab results found.             Passed - Recent (12 mo) or future (30 days) visit within the authorizing provider's specialty     Patient has had an office visit with the authorizing provider or a provider within the authorizing providers department within the previous 12 mos or has a future within next 30 days. See \"Patient Info\" tab in inbasket, or \"Choose Columns\" in Meds & Orders section of the refill encounter.              Passed - Medication is active on med list        Passed - Patient is age 18 or older        Passed - No active pregnancy on record        Passed - No positive pregnancy test in past 12 months             Jennifer Briones RN 09/25/21 8:29 PM  "

## 2021-10-10 ENCOUNTER — HEALTH MAINTENANCE LETTER (OUTPATIENT)
Age: 66
End: 2021-10-10

## 2021-10-15 DIAGNOSIS — G25.0 BENIGN FAMILIAL TREMOR: ICD-10-CM

## 2021-10-15 RX ORDER — PRIMIDONE 50 MG/1
50 TABLET ORAL AT BEDTIME
Qty: 90 TABLET | Refills: 3 | Status: SHIPPED | OUTPATIENT
Start: 2021-10-15 | End: 2022-08-25

## 2021-10-15 NOTE — TELEPHONE ENCOUNTER
Pending Prescriptions:                       Disp   Refills    primidone (MYSOLINE) 50 MG tablet         90 tab*3            Sig: Take 1 tablet (50 mg) by mouth At Bedtime    Last office visit with Dr. Allen was 8/16/21.

## 2021-10-15 NOTE — TELEPHONE ENCOUNTER
Reason for Call:  Medication or medication refill:    Do you use a [facility]?  Name of the pharmacy and phone number for the current request:  [facility]updated [facility]    Name of the medication requested:     primidone (MYSOLINE) 50 MG tablet 90 tablet 3 9/3/2020  No   Sig - Route: [PRIMIDONE (MYSOLINE) 50 MG TABLET] Take 1 tablet (50 mg total) by mouth at bedtime. - Oral         Other request: pharm requested about 1 week ago and no response    Can we leave a detailed message on this number? YES    Phone number patient can be reached at: Cell number on file:    Telephone Information:   Mobile [phone]       Best Time: any    Call taken on 10/15/2021 at 9:19 AM by Pam J. Behr

## 2021-10-16 DIAGNOSIS — R42 VERTIGO: ICD-10-CM

## 2021-10-17 RX ORDER — VENLAFAXINE 37.5 MG/1
37.5 TABLET ORAL 2 TIMES DAILY
Qty: 90 TABLET | Refills: 1 | OUTPATIENT
Start: 2021-10-17

## 2021-10-25 DIAGNOSIS — R42 VERTIGO: ICD-10-CM

## 2021-10-25 DIAGNOSIS — E78.2 MIXED HYPERLIPIDEMIA: ICD-10-CM

## 2021-10-26 RX ORDER — ATORVASTATIN CALCIUM 20 MG/1
TABLET, FILM COATED ORAL
Qty: 90 TABLET | Refills: 0 | Status: SHIPPED | OUTPATIENT
Start: 2021-10-26 | End: 2022-01-18

## 2021-10-26 RX ORDER — VENLAFAXINE 37.5 MG/1
37.5 TABLET ORAL 2 TIMES DAILY
Qty: 180 TABLET | Refills: 1 | Status: SHIPPED | OUTPATIENT
Start: 2021-10-26 | End: 2022-03-09

## 2021-10-26 NOTE — TELEPHONE ENCOUNTER
"Routing refill request to provider for review/approval because:  Labs not current:  multiple    Last Written Prescription Date:  9/26/21  Last Fill Quantity: 30,  # refills: 0   Last office visit provider:  8/16/21     Last Written Prescription Date:  8/16/21  Last Fill Quantity: 90,  # refills: 1   Last office visit provider:  8/16/21     Requested Prescriptions   Pending Prescriptions Disp Refills     atorvastatin (LIPITOR) 20 MG tablet [Pharmacy Med Name: atorvastatin 20 mg tablet] 30 tablet 0     Sig: TAKE 1 TABLET BY MOUTH EVERY DAY       Statins Protocol Failed - 10/25/2021  8:59 AM        Failed - LDL on file in past 12 months     Recent Labs   Lab Test 09/03/20  1339                Passed - No abnormal creatine kinase in past 12 months     No lab results found.             Passed - Recent (12 mo) or future (30 days) visit within the authorizing provider's specialty     Patient has had an office visit with the authorizing provider or a provider within the authorizing providers department within the previous 12 mos or has a future within next 30 days. See \"Patient Info\" tab in inbasket, or \"Choose Columns\" in Meds & Orders section of the refill encounter.              Passed - Medication is active on med list        Passed - Patient is age 18 or older        Passed - No active pregnancy on record        Passed - No positive pregnancy test in past 12 months           venlafaxine (EFFEXOR) 37.5 MG tablet [Pharmacy Med Name: venlafaxine 37.5 mg tablet] 90 tablet 1     Sig: Take 1 tablet (37.5 mg) by mouth 2 times daily       Serotonin-Norepinephrine Reuptake Inhibitors  Failed - 10/25/2021  8:59 AM        Failed - Normal serum creatinine on file in past 12 months     Recent Labs   Lab Test 09/03/20  1339   CR 0.72       Ok to refill medication if creatinine is low          Passed - Blood pressure under 140/90 in past 12 months     BP Readings from Last 3 Encounters:   08/16/21 118/78   09/07/20 134/82 " "  09/03/20 132/80                 Passed - Recent (12 mo) or future (30 days) visit within the authorizing provider's specialty     Patient has had an office visit with the authorizing provider or a provider within the authorizing providers department within the previous 12 mos or has a future within next 30 days. See \"Patient Info\" tab in inbasket, or \"Choose Columns\" in Meds & Orders section of the refill encounter.              Passed - Medication is active on med list        Passed - Patient is age 18 or older        Passed - No active pregnancy on record        Passed - No positive pregnancy test in past 12 months             Lakisha Wasserman RN 10/26/21 1:13 PM  "

## 2021-11-22 DIAGNOSIS — R42 VERTIGO: ICD-10-CM

## 2021-11-23 RX ORDER — DIAZEPAM 2 MG
2 TABLET ORAL EVERY 6 HOURS PRN
Qty: 18 TABLET | Refills: 1 | Status: SHIPPED | OUTPATIENT
Start: 2021-11-23 | End: 2022-10-26

## 2021-11-23 NOTE — TELEPHONE ENCOUNTER
Routing refill request to provider for review/approval because:  Drug not on the G refill protocol   diazepam (VALIUM) 2 MG tablet 18 tablet 1 8/16/2021  --   Sig - Route: Take 1 tablet (2 mg) by mouth every 6 hours as needed for anxiety - Oral

## 2022-01-15 DIAGNOSIS — E78.2 MIXED HYPERLIPIDEMIA: ICD-10-CM

## 2022-01-18 RX ORDER — ATORVASTATIN CALCIUM 20 MG/1
TABLET, FILM COATED ORAL
Qty: 90 TABLET | Refills: 0 | Status: SHIPPED | OUTPATIENT
Start: 2022-01-18 | End: 2022-04-12

## 2022-01-18 NOTE — TELEPHONE ENCOUNTER
"Routing refill request to provider for review/approval because:  Labs not current:  LDL    Last Written Prescription Date:  10/26/21  Last Fill Quantity: 90,  # refills: 0   Last office visit provider:  8/16/21     Requested Prescriptions   Pending Prescriptions Disp Refills     atorvastatin (LIPITOR) 20 MG tablet [Pharmacy Med Name: atorvastatin 20 mg tablet] 90 tablet 0     Sig: TAKE 1 TABLET BY MOUTH EVERY DAY       Statins Protocol Failed - 1/15/2022  8:02 AM        Failed - LDL on file in past 12 months     Recent Labs   Lab Test 09/03/20  1339                Passed - No abnormal creatine kinase in past 12 months     No lab results found.             Passed - Recent (12 mo) or future (30 days) visit within the authorizing provider's specialty     Patient has had an office visit with the authorizing provider or a provider within the authorizing providers department within the previous 12 mos or has a future within next 30 days. See \"Patient Info\" tab in inbasket, or \"Choose Columns\" in Meds & Orders section of the refill encounter.              Passed - Medication is active on med list        Passed - Patient is age 18 or older        Passed - No active pregnancy on record        Passed - No positive pregnancy test in past 12 months             Oliverio Kowalski RN 01/18/22 7:30 AM  "

## 2022-03-08 DIAGNOSIS — R42 VERTIGO: ICD-10-CM

## 2022-03-09 RX ORDER — VENLAFAXINE 37.5 MG/1
37.5 TABLET ORAL 2 TIMES DAILY
Qty: 180 TABLET | Refills: 1 | Status: SHIPPED | OUTPATIENT
Start: 2022-03-09 | End: 2022-08-26

## 2022-03-09 NOTE — TELEPHONE ENCOUNTER
"Routing refill request to provider for review/approval because:  Labs not current:  CR    Last Written Prescription Date:  10/26/21  Last Fill Quantity: 180,  # refills: 1   Last office visit provider: 8/16/21       Requested Prescriptions   Pending Prescriptions Disp Refills     venlafaxine (EFFEXOR) 37.5 MG tablet [Pharmacy Med Name: venlafaxine 37.5 mg tablet] 180 tablet 1     Sig: Take 1 tablet (37.5 mg) by mouth 2 times daily       Serotonin-Norepinephrine Reuptake Inhibitors  Failed - 3/8/2022  8:00 AM        Failed - Normal serum creatinine on file in past 12 months     Recent Labs   Lab Test 09/03/20  1339   CR 0.72       Ok to refill medication if creatinine is low          Passed - Blood pressure under 140/90 in past 12 months     BP Readings from Last 3 Encounters:   08/16/21 118/78   09/07/20 134/82   09/03/20 132/80                 Passed - Recent (12 mo) or future (30 days) visit within the authorizing provider's specialty     Patient has had an office visit with the authorizing provider or a provider within the authorizing providers department within the previous 12 mos or has a future within next 30 days. See \"Patient Info\" tab in inbasket, or \"Choose Columns\" in Meds & Orders section of the refill encounter.              Passed - Medication is active on med list        Passed - Patient is age 18 or older        Passed - No active pregnancy on record        Passed - No positive pregnancy test in past 12 months             Keisha Guerrero RN 03/09/22 9:45 AM  "

## 2022-04-09 DIAGNOSIS — E78.2 MIXED HYPERLIPIDEMIA: ICD-10-CM

## 2022-04-11 NOTE — TELEPHONE ENCOUNTER
"Routing refill request to provider for review/approval because:  Labs not current:  LDL    Last Written Prescription Date:  1/18/2022  Last Fill Quantity: 90,  # refills: 0   Last office visit provider:  8/16/2021     Requested Prescriptions   Pending Prescriptions Disp Refills     atorvastatin (LIPITOR) 20 MG tablet [Pharmacy Med Name: atorvastatin 20 mg tablet] 90 tablet 0     Sig: TAKE 1 TABLET BY MOUTH EVERY DAY       Statins Protocol Failed - 4/11/2022  3:21 PM        Failed - LDL on file in past 12 months     Recent Labs   Lab Test 09/03/20  1339                Passed - No abnormal creatine kinase in past 12 months     No lab results found.             Passed - Recent (12 mo) or future (30 days) visit within the authorizing provider's specialty     Patient has had an office visit with the authorizing provider or a provider within the authorizing providers department within the previous 12 mos or has a future within next 30 days. See \"Patient Info\" tab in inbasket, or \"Choose Columns\" in Meds & Orders section of the refill encounter.              Passed - Medication is active on med list        Passed - Patient is age 18 or older        Passed - No active pregnancy on record        Passed - No positive pregnancy test in past 12 months             Marbella Raymond RN 04/11/22 3:21 PM  "

## 2022-04-12 RX ORDER — ATORVASTATIN CALCIUM 20 MG/1
TABLET, FILM COATED ORAL
Qty: 90 TABLET | Refills: 0 | Status: SHIPPED | OUTPATIENT
Start: 2022-04-12 | End: 2022-07-09

## 2022-05-05 DIAGNOSIS — Z76.0 ENCOUNTER FOR MEDICATION REFILL: Primary | ICD-10-CM

## 2022-05-05 DIAGNOSIS — I10 ESSENTIAL HYPERTENSION, BENIGN: ICD-10-CM

## 2022-05-05 DIAGNOSIS — I10 ESSENTIAL HYPERTENSION: ICD-10-CM

## 2022-05-05 RX ORDER — LISINOPRIL 10 MG/1
TABLET ORAL
Qty: 180 TABLET | Refills: 1 | Status: SHIPPED | OUTPATIENT
Start: 2022-05-05 | End: 2022-10-26

## 2022-05-05 RX ORDER — HYDROCHLOROTHIAZIDE 12.5 MG/1
TABLET ORAL
Qty: 90 TABLET | Refills: 1 | Status: SHIPPED | OUTPATIENT
Start: 2022-05-05 | End: 2022-10-26

## 2022-05-18 ENCOUNTER — ANCILLARY PROCEDURE (OUTPATIENT)
Dept: MAMMOGRAPHY | Facility: CLINIC | Age: 67
End: 2022-05-18
Attending: FAMILY MEDICINE
Payer: COMMERCIAL

## 2022-05-18 DIAGNOSIS — Z12.31 VISIT FOR SCREENING MAMMOGRAM: ICD-10-CM

## 2022-05-18 PROCEDURE — 77067 SCR MAMMO BI INCL CAD: CPT

## 2022-08-25 DIAGNOSIS — Z76.0 ENCOUNTER FOR MEDICATION REFILL: Primary | ICD-10-CM

## 2022-08-25 DIAGNOSIS — R42 VERTIGO: ICD-10-CM

## 2022-08-25 DIAGNOSIS — G25.0 BENIGN FAMILIAL TREMOR: ICD-10-CM

## 2022-08-25 RX ORDER — PRIMIDONE 50 MG/1
50 TABLET ORAL AT BEDTIME
Qty: 90 TABLET | Refills: 3 | Status: SHIPPED | OUTPATIENT
Start: 2022-08-25 | End: 2023-08-29

## 2022-08-26 RX ORDER — VENLAFAXINE 37.5 MG/1
37.5 TABLET ORAL 2 TIMES DAILY
Qty: 180 TABLET | Refills: 0 | Status: SHIPPED | OUTPATIENT
Start: 2022-08-26 | End: 2022-10-26

## 2022-08-26 NOTE — TELEPHONE ENCOUNTER
"Routing refill request to provider for review/approval because:  Labs out of range:  Creatinine  Patient needs to be seen because it has been more than 1 year since last office visit.  BP not current    Last Written Prescription Date:  3/9/22  Last Fill Quantity: 180,  # refills: 1   Last office visit provider:  8/16/21     Requested Prescriptions   Pending Prescriptions Disp Refills     venlafaxine (EFFEXOR) 37.5 MG tablet [Pharmacy Med Name: venlafaxine 37.5 mg tablet] 180 tablet 1     Sig: Take 1 tablet (37.5 mg) by mouth 2 times daily       Serotonin-Norepinephrine Reuptake Inhibitors  Failed - 8/26/2022  1:57 PM        Failed - Blood pressure under 140/90 in past 12 months     BP Readings from Last 3 Encounters:   08/16/21 118/78   09/07/20 134/82   09/03/20 132/80                 Failed - Recent (12 mo) or future (30 days) visit within the authorizing provider's specialty     Patient has had an office visit with the authorizing provider or a provider within the authorizing providers department within the previous 12 mos or has a future within next 30 days. See \"Patient Info\" tab in inbasket, or \"Choose Columns\" in Meds & Orders section of the refill encounter.              Failed - Normal serum creatinine on file in past 12 months     Recent Labs   Lab Test 09/03/20  1339   CR 0.72       Ok to refill medication if creatinine is low          Passed - Medication is active on med list        Passed - Patient is age 18 or older        Passed - No active pregnancy on record        Passed - No positive pregnancy test in past 12 months             Oliverio Kowalski RN 08/26/22 1:57 PM  "

## 2022-09-18 ENCOUNTER — HEALTH MAINTENANCE LETTER (OUTPATIENT)
Age: 67
End: 2022-09-18

## 2022-10-25 ASSESSMENT — ENCOUNTER SYMPTOMS
PARESTHESIAS: 0
PALPITATIONS: 0
CHILLS: 0
HEMATOCHEZIA: 0
HEMATURIA: 0
WEAKNESS: 0
SORE THROAT: 0
DIARRHEA: 0
BREAST MASS: 0
DYSURIA: 0
FREQUENCY: 0
ABDOMINAL PAIN: 0
COUGH: 0
EYE PAIN: 0
SHORTNESS OF BREATH: 0
FEVER: 0
JOINT SWELLING: 0
MYALGIAS: 0
NAUSEA: 0
CONSTIPATION: 0
ARTHRALGIAS: 0
DIZZINESS: 0
HEARTBURN: 1
NERVOUS/ANXIOUS: 0
HEADACHES: 0

## 2022-10-25 ASSESSMENT — ACTIVITIES OF DAILY LIVING (ADL): CURRENT_FUNCTION: NO ASSISTANCE NEEDED

## 2022-10-26 ENCOUNTER — OFFICE VISIT (OUTPATIENT)
Dept: INTERNAL MEDICINE | Facility: CLINIC | Age: 67
End: 2022-10-26
Payer: COMMERCIAL

## 2022-10-26 VITALS
WEIGHT: 146.3 LBS | TEMPERATURE: 98.3 F | HEIGHT: 61 IN | SYSTOLIC BLOOD PRESSURE: 121 MMHG | DIASTOLIC BLOOD PRESSURE: 91 MMHG | HEART RATE: 75 BPM | BODY MASS INDEX: 27.62 KG/M2 | OXYGEN SATURATION: 96 %

## 2022-10-26 DIAGNOSIS — Z00.00 ENCOUNTER FOR ANNUAL WELLNESS EXAM IN MEDICARE PATIENT: ICD-10-CM

## 2022-10-26 DIAGNOSIS — Z00.00 HEALTH CARE MAINTENANCE: Primary | ICD-10-CM

## 2022-10-26 DIAGNOSIS — M54.12 BRACHIAL NEURITIS OR RADICULITIS: ICD-10-CM

## 2022-10-26 DIAGNOSIS — G25.0 BENIGN FAMILIAL TREMOR: ICD-10-CM

## 2022-10-26 DIAGNOSIS — F41.9 ANXIETY: ICD-10-CM

## 2022-10-26 DIAGNOSIS — F32.1 CURRENT MODERATE EPISODE OF MAJOR DEPRESSIVE DISORDER WITHOUT PRIOR EPISODE (H): ICD-10-CM

## 2022-10-26 DIAGNOSIS — I10 ESSENTIAL HYPERTENSION: ICD-10-CM

## 2022-10-26 DIAGNOSIS — E78.2 MIXED HYPERLIPIDEMIA: ICD-10-CM

## 2022-10-26 DIAGNOSIS — I10 ESSENTIAL HYPERTENSION, BENIGN: ICD-10-CM

## 2022-10-26 DIAGNOSIS — Z76.0 ENCOUNTER FOR MEDICATION REFILL: Primary | ICD-10-CM

## 2022-10-26 DIAGNOSIS — R79.89 ELEVATED LIVER FUNCTION TESTS: ICD-10-CM

## 2022-10-26 DIAGNOSIS — Z00.00 ENCOUNTER FOR MEDICARE ANNUAL WELLNESS EXAM: ICD-10-CM

## 2022-10-26 DIAGNOSIS — R42 VERTIGO: ICD-10-CM

## 2022-10-26 PROBLEM — H90.3 SENSORINEURAL HEARING LOSS, BILATERAL: Status: ACTIVE | Noted: 2017-05-11

## 2022-10-26 LAB
ALBUMIN SERPL BCG-MCNC: 4.7 G/DL (ref 3.5–5.2)
ALP SERPL-CCNC: 85 U/L (ref 35–104)
ALT SERPL W P-5'-P-CCNC: 72 U/L (ref 10–35)
ANION GAP SERPL CALCULATED.3IONS-SCNC: 14 MMOL/L (ref 7–15)
AST SERPL W P-5'-P-CCNC: 52 U/L (ref 10–35)
BILIRUB SERPL-MCNC: 0.4 MG/DL
BUN SERPL-MCNC: 21.3 MG/DL (ref 8–23)
CALCIUM SERPL-MCNC: 9.8 MG/DL (ref 8.8–10.2)
CHLORIDE SERPL-SCNC: 98 MMOL/L (ref 98–107)
CHOLEST SERPL-MCNC: 212 MG/DL
CREAT SERPL-MCNC: 0.63 MG/DL (ref 0.51–0.95)
DEPRECATED HCO3 PLAS-SCNC: 25 MMOL/L (ref 22–29)
ERYTHROCYTE [DISTWIDTH] IN BLOOD BY AUTOMATED COUNT: 12.9 % (ref 10–15)
GFR SERPL CREATININE-BSD FRML MDRD: >90 ML/MIN/1.73M2
GLUCOSE SERPL-MCNC: 112 MG/DL (ref 70–99)
HCT VFR BLD AUTO: 43.1 % (ref 35–47)
HDLC SERPL-MCNC: 77 MG/DL
HGB BLD-MCNC: 14.1 G/DL (ref 11.7–15.7)
LDLC SERPL CALC-MCNC: 100 MG/DL
MCH RBC QN AUTO: 29.6 PG (ref 26.5–33)
MCHC RBC AUTO-ENTMCNC: 32.7 G/DL (ref 31.5–36.5)
MCV RBC AUTO: 90 FL (ref 78–100)
NONHDLC SERPL-MCNC: 135 MG/DL
PLATELET # BLD AUTO: 296 10E3/UL (ref 150–450)
POTASSIUM SERPL-SCNC: 3.9 MMOL/L (ref 3.4–5.3)
PROT SERPL-MCNC: 7.1 G/DL (ref 6.4–8.3)
RBC # BLD AUTO: 4.77 10E6/UL (ref 3.8–5.2)
SODIUM SERPL-SCNC: 137 MMOL/L (ref 136–145)
TRIGL SERPL-MCNC: 173 MG/DL
TSH SERPL DL<=0.005 MIU/L-ACNC: 2.19 UIU/ML (ref 0.3–4.2)
WBC # BLD AUTO: 6.5 10E3/UL (ref 4–11)

## 2022-10-26 PROCEDURE — 84443 ASSAY THYROID STIM HORMONE: CPT | Performed by: INTERNAL MEDICINE

## 2022-10-26 PROCEDURE — 85027 COMPLETE CBC AUTOMATED: CPT | Performed by: INTERNAL MEDICINE

## 2022-10-26 PROCEDURE — G0008 ADMIN INFLUENZA VIRUS VAC: HCPCS | Performed by: INTERNAL MEDICINE

## 2022-10-26 PROCEDURE — 36415 COLL VENOUS BLD VENIPUNCTURE: CPT | Performed by: INTERNAL MEDICINE

## 2022-10-26 PROCEDURE — 80053 COMPREHEN METABOLIC PANEL: CPT | Performed by: INTERNAL MEDICINE

## 2022-10-26 PROCEDURE — G0438 PPPS, INITIAL VISIT: HCPCS | Performed by: INTERNAL MEDICINE

## 2022-10-26 PROCEDURE — 80061 LIPID PANEL: CPT | Performed by: INTERNAL MEDICINE

## 2022-10-26 PROCEDURE — 90662 IIV NO PRSV INCREASED AG IM: CPT | Performed by: INTERNAL MEDICINE

## 2022-10-26 RX ORDER — HYDROCHLOROTHIAZIDE 12.5 MG/1
TABLET ORAL
Qty: 90 TABLET | Refills: 3 | Status: SHIPPED | OUTPATIENT
Start: 2022-10-26 | End: 2023-11-03

## 2022-10-26 RX ORDER — ALPRAZOLAM 0.25 MG
TABLET ORAL
Qty: 30 TABLET | Refills: 1 | Status: SHIPPED | OUTPATIENT
Start: 2022-10-26 | End: 2023-11-14

## 2022-10-26 RX ORDER — LISINOPRIL 10 MG/1
TABLET ORAL
Qty: 180 TABLET | Refills: 3 | Status: SHIPPED | OUTPATIENT
Start: 2022-10-26 | End: 2023-11-03

## 2022-10-26 ASSESSMENT — ENCOUNTER SYMPTOMS
EYE PAIN: 0
CHILLS: 0
PALPITATIONS: 0
DIARRHEA: 0
HEMATURIA: 0
BREAST MASS: 0
WEAKNESS: 0
ARTHRALGIAS: 0
PARESTHESIAS: 0
CONSTIPATION: 0
MYALGIAS: 0
DYSURIA: 0
COUGH: 0
DIZZINESS: 0
SORE THROAT: 0
FREQUENCY: 0
NAUSEA: 0
HEADACHES: 0
NERVOUS/ANXIOUS: 0
HEMATOCHEZIA: 0
ABDOMINAL PAIN: 0
HEARTBURN: 1
JOINT SWELLING: 0
FEVER: 0
SHORTNESS OF BREATH: 0

## 2022-10-26 ASSESSMENT — PATIENT HEALTH QUESTIONNAIRE - PHQ9
10. IF YOU CHECKED OFF ANY PROBLEMS, HOW DIFFICULT HAVE THESE PROBLEMS MADE IT FOR YOU TO DO YOUR WORK, TAKE CARE OF THINGS AT HOME, OR GET ALONG WITH OTHER PEOPLE: NOT DIFFICULT AT ALL
SUM OF ALL RESPONSES TO PHQ QUESTIONS 1-9: 3
SUM OF ALL RESPONSES TO PHQ QUESTIONS 1-9: 3

## 2022-10-26 ASSESSMENT — ACTIVITIES OF DAILY LIVING (ADL): CURRENT_FUNCTION: NO ASSISTANCE NEEDED

## 2022-10-26 NOTE — PATIENT INSTRUCTIONS
SHOTS due :   PREVNAR 20 and shingles   Patient Education   Personalized Prevention Plan  You are due for the preventive services outlined below.  Your care team is available to assist you in scheduling these services.  If you have already completed any of these items, please share that information with your care team to update in your medical record.  Health Maintenance Due   Topic Date Due    Depression Action Plan  Never done    Hepatitis B Vaccine (1 of 3 - 3-dose series) Never done    Hepatitis C Screening  Never done    Zoster (Shingles) Vaccine (1 of 2) Never done    Pneumococcal Vaccine (1 - PCV) Never done    Annual Wellness Visit  09/03/2021    COVID-19 Vaccine (4 - Booster for Moderna series) 03/14/2022    ANNUAL REVIEW OF HM ORDERS  08/16/2022    Flu Vaccine (1) 09/01/2022       Signs of Hearing Loss      Hearing much better with one ear can be a sign of hearing loss.   Hearing loss is a problem shared by many people. In fact, it is one of the most common health problems, particularly as people age. Most people age 65 and older have some hearing loss. By age 80, almost everyone does. Hearing loss often occurs slowly over the years. So you may not realize your hearing has gotten worse.  Have your hearing checked  Call your healthcare provider if you:  Have to strain to hear normal conversation  Have to watch other people s faces very carefully to follow what they re saying  Need to ask people to repeat what they ve said  Often misunderstand what people are saying  Turn the volume of the television or radio up so high that others complain  Feel that people are mumbling when they re talking to you  Find that the effort to hear leaves you feeling tired and irritated  Notice, when using the phone, that you hear better with one ear than the other  [employer] last reviewed this educational content on 1/1/2020 2000-2021 [employer]. All rights reserved. This information is not intended as a  substitute for professional medical care. Always follow your healthcare professional's instructions.

## 2022-10-26 NOTE — PROGRESS NOTES
"SUBJECTIVE:   Lakisha is a 67 year very pleasant old who presents for Preventive Visit.  Patient Active Problem List   Diagnosis     Hyperlipidemia     Osteoarthritis Of The Knee     Cervical Radiculopathy     Current moderate episode of major depressive disorder without prior episode (H)     Benign Essential Hypertension     Benign familial tremor     Anxiety     Health care maintenance     Current Outpatient Medications   Medication     artificial tears,hypromellose, (ISOPTO TEARS) 0.5 % ophthalmic solution     atorvastatin (LIPITOR) 20 MG tablet     hydrochlorothiazide (HYDRODIURIL) 12.5 MG tablet     lisinopril (ZESTRIL) 10 MG tablet     omeprazole (PRILOSEC) 40 MG capsule     primidone (MYSOLINE) 50 MG tablet     No current facility-administered medications for this visit.         Patient has been advised of split billing requirements and indicates understanding: Yes  Are you in the first 12 months of your Medicare coverage?  No    Healthy Habits:     In general, how would you rate your overall health?  Good    Frequency of exercise:  6-7 days/week    Duration of exercise:  30-45 minutes    Do you usually eat at least 4 servings of fruit and vegetables a day, include whole grains    & fiber and avoid regularly eating high fat or \"junk\" foods?  Yes    Taking medications regularly:  Yes    Medication side effects:  None    Ability to successfully perform activities of daily living:  No assistance needed    Home Safety:  No safety concerns identified    Hearing Impairment:  Difficulty following a conversation in a noisy restaurant or crowded room, difficult to understand a speaker at a public meeting or [employer] service, need to ask people to speak up or repeat themselves and difficulty understanding soft or whispered speech    In the past 6 months, have you been bothered by leaking of urine?  No    In general, how would you rate your overall mental or emotional health?  Excellent      PHQ-2 Total Score: 0    " Additional concerns today:  Yes    Do you feel safe in your environment? Yes    Have you ever done Advance Care Planning? (For example, a Health Directive, POLST, or a discussion with a medical provider or your loved ones about your wishes): Yes, patient states has an Advance Care Planning document and will bring a copy to the clinic.       Fall risk  Fallen 2 or more times in the past year?: No  Any fall with injury in the past year?: No    Cognitive Screening   Six Item Cognitive Impairment Test   (6CIT):      What year is it?                               Correct - 0 points    What month is it?                               Correct - 0 points      Give the patient an address to remember with five components:   [place]is [place]apa ( first and last name - 2 components)   [address]  (number and name of street - 2 components)   [place] ( city - 1 component)      About what time is it (within the hour)? Correct - 0 points    Count backwards from 20 to 1:   Correct - 0 points    Say the months of the year in reverse: Correct - 0 points    Repeat the address phrase:   1 error - 2 points    Total 6CIT Score:      1/28    Interpretation: The 6CIT uses an inverse score and questions are weighted to produce a total out of 28. Scores of 0-7 are considered normal and 8 or more significant.    Advantages The test has high sensitivity without compromising specificity even in mild dementia. It is easy to translate linguistically and culturally.  Disadvantages The main disadvantage is in the scoring and weighting of the test, which is initially confusing, however computer models have simplified this greatly.    Probability Statistics: At the 7/8 cut off: Overall figures sensitivity 90% specificity 100%, in mild dementia sensitivity = 78% , specificity = 100%    Copyright 2000 The [facility]. Courtesy of Dr. Nato Flores      Do you have sleep apnea, excessive snoring or daytime drowsiness?:  no    Reviewed and updated as needed this visit by clinical staff                  Reviewed and updated as needed this visit by Provider                 Social History     Tobacco Use     Smoking status: Never     Smokeless tobacco: Never   Substance Use Topics     Alcohol use: Not on file         Alcohol Use 10/25/2022   Prescreen: >3 drinks/day or >7 drinks/week? No               Current providers sharing in care for this patient include:   Patient Care Team:  Stephanie Allen MD as PCP - General (Internal Medicine)  Stephanie Allen MD as Assigned PCP    The following health maintenance items are reviewed in Epic and correct as of today:  Health Maintenance   Topic Date Due     ADVANCE CARE PLANNING  Never done     DEPRESSION ACTION PLAN  Never done     HEPATITIS B IMMUNIZATION (1 of 3 - 3-dose series) Never done     HEPATITIS C SCREENING  Never done     ZOSTER IMMUNIZATION (1 of 2) Never done     Pneumococcal Vaccine: 65+ Years (1 - PCV) Never done     MEDICARE ANNUAL WELLNESS VISIT  09/03/2021     COVID-19 Vaccine (4 - Booster for Moderna series) 03/14/2022     ANNUAL REVIEW OF HM ORDERS  08/16/2022     INFLUENZA VACCINE (1) 09/01/2022     PHQ-9  04/26/2023     FALL RISK ASSESSMENT  10/26/2023     MAMMO SCREENING  05/18/2024     DTAP/TDAP/TD IMMUNIZATION (2 - Td or Tdap) 01/08/2025     LIPID  09/03/2025     COLORECTAL CANCER SCREENING  01/17/2030     DEXA  09/22/2036     IPV IMMUNIZATION  Aged Out     MENINGITIS IMMUNIZATION  Aged Out           FHS-7:   Breast CA Risk Assessment (FHS-7) 5/18/2022 10/25/2022   Did any of your first-degree relatives have breast or ovarian cancer? No No   Did any of your relatives have bilateral breast cancer? No No   Did any man in your family have breast cancer? No No   Did any woman in your family have breast and ovarian cancer? No No   Did any woman in your family have breast cancer before age 50 y? No No   Do you have 2 or more relatives with breast and/or ovarian cancer? No  "No   Do you have 2 or more relatives with breast and/or bowel cancer? No No         Pertinent mammograms are reviewed under the imaging tab.    Review of Systems   Constitutional: Negative for chills and fever.   HENT: Positive for hearing loss. Negative for congestion, ear pain and sore throat.    Eyes: Negative for pain and visual disturbance.   Respiratory: Negative for cough and shortness of breath.    Cardiovascular: Negative for chest pain, palpitations and peripheral edema.   Gastrointestinal: Positive for heartburn. Negative for abdominal pain, constipation, diarrhea, hematochezia and nausea.   Breasts:  Negative for tenderness, breast mass and discharge.   Genitourinary: Negative for dysuria, frequency, genital sores, hematuria, pelvic pain, urgency, vaginal bleeding and vaginal discharge.   Musculoskeletal: Negative for arthralgias, joint swelling and myalgias.   Skin: Negative for rash.   Neurological: Negative for dizziness, weakness, headaches and paresthesias.   Psychiatric/Behavioral: Negative for mood changes. The patient is not nervous/anxious.          OBJECTIVE:   BP (!) 121/91 (BP Location: Left arm, Patient Position: Sitting, Cuff Size: Adult Large)   Pulse 75   Temp 98.3  F (36.8  C) (Tympanic)   Ht 1.537 m (5' 0.5\")   Wt 66.4 kg (146 lb 4.8 oz)   SpO2 96%   BMI 28.10 kg/m   Estimated body mass index is 29.1 kg/m  as calculated from the following:    Height as of 8/16/21: 1.524 m (5').    Weight as of 8/16/21: 67.6 kg (149 lb).  Physical Exam  GENERAL: healthy, alert and no distress  EYES: Eyes grossly normal to inspection, PERRL and conjunctivae and sclerae normal  HENT: ear canals and TM's normal, nose and mouth without ulcers or lesions  NECK: no adenopathy, no asymmetry, masses, or scars and thyroid normal to palpation  RESP: lungs clear to auscultation - no rales, rhonchi or wheezes  BREAST: normal without masses, tenderness or nipple discharge and no palpable axillary masses or " adenopathy  CV: regular rate and rhythm, normal S1 S2, no S3 or S4, no murmur, click or rub, no peripheral edema and peripheral pulses strong  ABDOMEN: soft, nontender, no hepatosplenomegaly, no masses and bowel sounds normal  MS: no gross musculoskeletal defects noted, no edema  SKIN: no suspicious lesions or rashes  NEURO: Normal strength and tone, mentation intact and speech normal  PSYCH: mentation appears normal, affect normal/bright        ASSESSMENT / PLAN:   (Z00.00) Health care maintenance  (primary encounter diagnosis)  Comment:   Plan: Colon 2020 clear next in ten years .  dexa 2021 normal   Immunization shingrix due   Pap ( has had a hysterectomy )  mammo annual     She is up-to-date immunization updated    (F41.9) Anxiety  Comment:   Plan:     (I10) Benign Essential Hypertension  Comment: Doing much betterPlan:     (Z00.00) Encounter for annual wellness exam in Medicare patient  Comment:   Plan:     (M54.12) Cervical Radiculopathy  Comment:   Plan:     (G25.0) Benign familial tremor  Comment:   Plan:   Also doing much better.  On primidone.  (Z00.00) Encounter for Medicare annual wellness exam  Comment:   Plan:     (F32.1) Current moderate episode of major depressive disorder without prior episode (H)  Comment: weaned off effexor as she is feeling much bettter and her vertiog was the real trigger for her mental health    is back to driving , biking .     (R42) Vertigo  Comment:  WENT TO [facility]   Due to intractable vertigo and finally had gentamicin injected intratympanic  and that was life changing  Now has special microphone hearing aids . No wha sresidual hearing loss and almost has no vertigo left   Plan:   Hypertension is well controlled on lisinopril thiazide and Lipitor for statin    Consider sleep study if her insomnia continues.  She takes alprazolam sparingly not every night and is does not want to try trazodone or mirtazapine at this point          COUNSELING:  Reviewed preventive health  counseling, as reflected in patient instructions    Estimated body mass index is 29.1 kg/m  as calculated from the following:    Height as of 8/16/21: 1.524 m (5').    Weight as of 8/16/21: 67.6 kg (149 lb).        She reports that she has never smoked. She has never used smokeless tobacco.      Appropriate preventive services were discussed with this patient, including applicable screening as appropriate for cardiovascular disease, diabetes, osteopenia/osteoporosis, and glaucoma.  As appropriate for age/gender, discussed screening for colorectal cancer, prostate cancer, breast cancer, and cervical cancer. Checklist reviewing preventive services available has been given to the patient.    Reviewed patients plan of care and provided an AVS. The Basic Care Plan (routine screening as documented in Health Maintenance) for Lakisha meets the Care Plan requirement. This Care Plan has been established and reviewed with the Patient.    Counseling Resources:  ATP IV Guidelines  Pooled Cohorts Equation Calculator  Breast Cancer Risk Calculator  Breast Cancer: Medication to Reduce Risk  FRAX Risk Assessment  ICSI Preventive Guidelines  Dietary Guidelines for Americans, 2010  [employer]'s MyPlate  ASA Prophylaxis  Lung CA Screening    Stephanie Allen MD  [employer]    Identified Health Risks:  Answers for HPI/ROS submitted by the patient on 10/26/2022  If you checked off any problems, how difficult have these problems made it for you to do your work, take care of things at home, or get along with other people?: Not difficult at all  PHQ9 TOTAL SCORE: 3        The patient was provided with written information regarding signs of hearing loss.

## 2022-10-31 DIAGNOSIS — R79.89 ELEVATED LIVER FUNCTION TESTS: Primary | ICD-10-CM

## 2022-11-01 ENCOUNTER — TELEPHONE (OUTPATIENT)
Dept: INTERNAL MEDICINE | Facility: CLINIC | Age: 67
End: 2022-11-01

## 2022-11-01 NOTE — TELEPHONE ENCOUNTER
General Call      Reason for Call:  Pt seen PCP for her checkup -   Pt told her that she seen Dr Service for vertigo and was given an injection    Pt stating her vertigo is back and she is seeing Dr Service on 11/04/2022    This is a FYI    What are your questions or concerns:  n/a    Date of last appointment with provider: n/a    Could we send this information to you in [facility] or would you prefer to receive a phone call?:   Patient would prefer a phone call   Okay to leave a detailed message?: Yes at Cell number on file:    Telephone Information:   Mobile [phone]

## 2022-11-10 ENCOUNTER — TELEPHONE (OUTPATIENT)
Dept: INTERNAL MEDICINE | Facility: CLINIC | Age: 67
End: 2022-11-10

## 2022-11-10 DIAGNOSIS — R79.89 ELEVATED LIVER FUNCTION TESTS: Primary | ICD-10-CM

## 2022-11-10 NOTE — TELEPHONE ENCOUNTER
----- Message from Stephanie Allen MD sent at 11/6/2022  7:29 PM CST -----  Please call pt with mychart result note . She hasnt read it     Kidney function tests are normal.  The liver function tests are slightly elevated.  This typically seen in fatty liver.  I would recommend an ultrasound of the liver.  I also added on some hepatitis screening tests.  Cannot be added on we might need to redraw.  Let Me know and I can order the ultrasound. Thanks Dr Allen

## 2022-11-10 NOTE — TELEPHONE ENCOUNTER
Attempted to contact patient to relay information below from Dr Allen. No answer. Left message to call clinic.

## 2022-11-10 NOTE — TELEPHONE ENCOUNTER
Pt would like the order for Ultrasound added    Please call patient when order has been placed

## 2022-11-14 ENCOUNTER — HOSPITAL ENCOUNTER (OUTPATIENT)
Dept: ULTRASOUND IMAGING | Facility: HOSPITAL | Age: 67
Discharge: HOME | End: 2022-11-14
Attending: INTERNAL MEDICINE
Payer: COMMERCIAL

## 2022-11-14 ENCOUNTER — APPOINTMENT (OUTPATIENT)
Dept: LAB | Facility: HOSPITAL | Age: 67
End: 2022-11-14
Attending: INTERNAL MEDICINE
Payer: COMMERCIAL

## 2022-11-14 DIAGNOSIS — R79.89 ELEVATED LIVER FUNCTION TESTS: ICD-10-CM

## 2022-11-14 LAB
HBV SURFACE AG SERPL QL IA: NONREACTIVE
HCV AB SERPL QL IA: NONREACTIVE

## 2022-11-14 PROCEDURE — 87340 HEPATITIS B SURFACE AG IA: CPT | Performed by: INTERNAL MEDICINE

## 2022-11-14 PROCEDURE — 86803 HEPATITIS C AB TEST: CPT | Performed by: INTERNAL MEDICINE

## 2022-11-14 PROCEDURE — 76705 ECHO EXAM OF ABDOMEN: CPT

## 2022-11-14 PROCEDURE — 36415 COLL VENOUS BLD VENIPUNCTURE: CPT | Performed by: INTERNAL MEDICINE

## 2023-03-22 ENCOUNTER — TRANSFERRED RECORDS (OUTPATIENT)
Dept: HEALTH INFORMATION MANAGEMENT | Facility: CLINIC | Age: 68
End: 2023-03-22

## 2023-07-21 DIAGNOSIS — E78.2 MIXED HYPERLIPIDEMIA: ICD-10-CM

## 2023-07-21 RX ORDER — ATORVASTATIN CALCIUM 20 MG/1
TABLET, FILM COATED ORAL
Qty: 90 TABLET | Refills: 2 | Status: SHIPPED | OUTPATIENT
Start: 2023-07-21 | End: 2024-04-23

## 2023-07-21 NOTE — TELEPHONE ENCOUNTER
"Last Written Prescription Date:  7/9/22  Last Fill Quantity: 90,  # refills: 3   Last office visit provider:  10/26/22     Requested Prescriptions   Pending Prescriptions Disp Refills    atorvastatin (LIPITOR) 20 MG tablet [Pharmacy Med Name: atorvastatin 20 mg tablet] 90 tablet 3     Sig: TAKE 1 TABLET BY MOUTH EVERY DAY       Statins Protocol Passed - 7/21/2023 10:58 AM        Passed - LDL on file in past 12 months     Recent Labs   Lab Test 10/26/22  1407                Passed - No abnormal creatine kinase in past 12 months     No lab results found.             Passed - Recent (12 mo) or future (30 days) visit within the authorizing provider's specialty     Patient has had an office visit with the authorizing provider or a provider within the authorizing providers department within the previous 12 mos or has a future within next 30 days. See \"Patient Info\" tab in inbasket, or \"Choose Columns\" in Meds & Orders section of the refill encounter.              Passed - Medication is active on med list        Passed - Patient is age 18 or older        Passed - No active pregnancy on record        Passed - No positive pregnancy test in past 12 months             Eileen Gaming RN 07/21/23 10:59 AM  "

## 2023-08-29 DIAGNOSIS — Z76.0 ENCOUNTER FOR MEDICATION REFILL: ICD-10-CM

## 2023-08-29 DIAGNOSIS — G25.0 BENIGN FAMILIAL TREMOR: ICD-10-CM

## 2023-08-29 RX ORDER — PRIMIDONE 50 MG/1
50 TABLET ORAL AT BEDTIME
Qty: 90 TABLET | Refills: 3 | Status: SHIPPED | OUTPATIENT
Start: 2023-08-29 | End: 2024-08-26

## 2023-11-03 DIAGNOSIS — I10 ESSENTIAL HYPERTENSION: ICD-10-CM

## 2023-11-03 DIAGNOSIS — I10 ESSENTIAL HYPERTENSION, BENIGN: ICD-10-CM

## 2023-11-03 DIAGNOSIS — Z76.0 ENCOUNTER FOR MEDICATION REFILL: ICD-10-CM

## 2023-11-03 RX ORDER — HYDROCHLOROTHIAZIDE 12.5 MG/1
TABLET ORAL
Qty: 90 TABLET | Refills: 3 | Status: SHIPPED | OUTPATIENT
Start: 2023-11-03

## 2023-11-03 RX ORDER — LISINOPRIL 10 MG/1
TABLET ORAL
Qty: 180 TABLET | Refills: 3 | Status: SHIPPED | OUTPATIENT
Start: 2023-11-03

## 2023-11-13 DIAGNOSIS — F41.9 ANXIETY: ICD-10-CM

## 2023-11-14 RX ORDER — ALPRAZOLAM 0.25 MG
TABLET ORAL
Qty: 30 TABLET | Refills: 1 | Status: SHIPPED | OUTPATIENT
Start: 2023-11-14 | End: 2024-01-19

## 2023-12-17 ENCOUNTER — HEALTH MAINTENANCE LETTER (OUTPATIENT)
Age: 68
End: 2023-12-17

## 2024-01-09 ENCOUNTER — TRANSFERRED RECORDS (OUTPATIENT)
Dept: HEALTH INFORMATION MANAGEMENT | Facility: CLINIC | Age: 69
End: 2024-01-09
Payer: COMMERCIAL

## 2024-01-16 ASSESSMENT — ENCOUNTER SYMPTOMS
HEADACHES: 0
NERVOUS/ANXIOUS: 0
CONSTIPATION: 0
JOINT SWELLING: 0
ARTHRALGIAS: 0
BREAST MASS: 0
SORE THROAT: 0
DIARRHEA: 0
WEAKNESS: 0
CHILLS: 0
COUGH: 0
HEMATOCHEZIA: 0
NAUSEA: 0
SHORTNESS OF BREATH: 0
HEARTBURN: 1
HEMATURIA: 0
DYSURIA: 0
DIZZINESS: 0
PARESTHESIAS: 0
PALPITATIONS: 0
ABDOMINAL PAIN: 0
FEVER: 0
EYE PAIN: 0
FREQUENCY: 1
MYALGIAS: 0

## 2024-01-16 ASSESSMENT — ACTIVITIES OF DAILY LIVING (ADL): CURRENT_FUNCTION: NO ASSISTANCE NEEDED

## 2024-01-19 ENCOUNTER — OFFICE VISIT (OUTPATIENT)
Dept: INTERNAL MEDICINE | Facility: CLINIC | Age: 69
End: 2024-01-19
Payer: COMMERCIAL

## 2024-01-19 ENCOUNTER — TRANSFERRED RECORDS (OUTPATIENT)
Dept: HEALTH INFORMATION MANAGEMENT | Facility: CLINIC | Age: 69
End: 2024-01-19

## 2024-01-19 VITALS
TEMPERATURE: 98.6 F | BODY MASS INDEX: 27.58 KG/M2 | RESPIRATION RATE: 16 BRPM | DIASTOLIC BLOOD PRESSURE: 70 MMHG | HEIGHT: 61 IN | SYSTOLIC BLOOD PRESSURE: 138 MMHG | WEIGHT: 146.1 LBS | OXYGEN SATURATION: 98 % | HEART RATE: 77 BPM

## 2024-01-19 DIAGNOSIS — F32.1 CURRENT MODERATE EPISODE OF MAJOR DEPRESSIVE DISORDER WITHOUT PRIOR EPISODE (H): ICD-10-CM

## 2024-01-19 DIAGNOSIS — R79.89 ELEVATED LIVER FUNCTION TESTS: ICD-10-CM

## 2024-01-19 DIAGNOSIS — F41.9 ANXIETY: ICD-10-CM

## 2024-01-19 DIAGNOSIS — E78.2 MIXED HYPERLIPIDEMIA: ICD-10-CM

## 2024-01-19 DIAGNOSIS — Z00.00 ENCOUNTER FOR MEDICARE ANNUAL WELLNESS EXAM: ICD-10-CM

## 2024-01-19 DIAGNOSIS — R35.0 URINARY FREQUENCY: ICD-10-CM

## 2024-01-19 DIAGNOSIS — E04.1 THYROID NODULE: ICD-10-CM

## 2024-01-19 DIAGNOSIS — I10 ESSENTIAL HYPERTENSION, BENIGN: ICD-10-CM

## 2024-01-19 DIAGNOSIS — R73.03 PREDIABETES: ICD-10-CM

## 2024-01-19 DIAGNOSIS — Z29.11 NEED FOR VACCINATION AGAINST RESPIRATORY SYNCYTIAL VIRUS: ICD-10-CM

## 2024-01-19 DIAGNOSIS — Z00.00 HEALTH CARE MAINTENANCE: ICD-10-CM

## 2024-01-19 DIAGNOSIS — N39.3 FEMALE STRESS INCONTINENCE: ICD-10-CM

## 2024-01-19 DIAGNOSIS — H91.22 SUDDEN LEFT HEARING LOSS: Primary | ICD-10-CM

## 2024-01-19 DIAGNOSIS — Z23 NEED FOR SHINGLES VACCINE: ICD-10-CM

## 2024-01-19 LAB
ERYTHROCYTE [DISTWIDTH] IN BLOOD BY AUTOMATED COUNT: 13.1 % (ref 10–15)
HBA1C MFR BLD: 6.5 % (ref 0–5.6)
HCT VFR BLD AUTO: 44.4 % (ref 35–47)
HGB BLD-MCNC: 14.4 G/DL (ref 11.7–15.7)
MCH RBC QN AUTO: 28.9 PG (ref 26.5–33)
MCHC RBC AUTO-ENTMCNC: 32.4 G/DL (ref 31.5–36.5)
MCV RBC AUTO: 89 FL (ref 78–100)
PLATELET # BLD AUTO: 347 10E3/UL (ref 150–450)
RBC # BLD AUTO: 4.98 10E6/UL (ref 3.8–5.2)
WBC # BLD AUTO: 10.7 10E3/UL (ref 4–11)

## 2024-01-19 PROCEDURE — 85027 COMPLETE CBC AUTOMATED: CPT | Performed by: INTERNAL MEDICINE

## 2024-01-19 PROCEDURE — 84443 ASSAY THYROID STIM HORMONE: CPT | Performed by: INTERNAL MEDICINE

## 2024-01-19 PROCEDURE — 83516 IMMUNOASSAY NONANTIBODY: CPT | Mod: 90 | Performed by: INTERNAL MEDICINE

## 2024-01-19 PROCEDURE — G0439 PPPS, SUBSEQ VISIT: HCPCS | Performed by: INTERNAL MEDICINE

## 2024-01-19 PROCEDURE — 86039 ANTINUCLEAR ANTIBODIES (ANA): CPT | Performed by: INTERNAL MEDICINE

## 2024-01-19 PROCEDURE — 83540 ASSAY OF IRON: CPT | Performed by: INTERNAL MEDICINE

## 2024-01-19 PROCEDURE — 36415 COLL VENOUS BLD VENIPUNCTURE: CPT | Performed by: INTERNAL MEDICINE

## 2024-01-19 PROCEDURE — 99000 SPECIMEN HANDLING OFFICE-LAB: CPT | Performed by: INTERNAL MEDICINE

## 2024-01-19 PROCEDURE — 99214 OFFICE O/P EST MOD 30 MIN: CPT | Mod: 25 | Performed by: INTERNAL MEDICINE

## 2024-01-19 PROCEDURE — 80053 COMPREHEN METABOLIC PANEL: CPT | Performed by: INTERNAL MEDICINE

## 2024-01-19 PROCEDURE — 83036 HEMOGLOBIN GLYCOSYLATED A1C: CPT | Performed by: INTERNAL MEDICINE

## 2024-01-19 PROCEDURE — 83550 IRON BINDING TEST: CPT | Performed by: INTERNAL MEDICINE

## 2024-01-19 PROCEDURE — 82728 ASSAY OF FERRITIN: CPT | Performed by: INTERNAL MEDICINE

## 2024-01-19 PROCEDURE — 86038 ANTINUCLEAR ANTIBODIES: CPT | Performed by: INTERNAL MEDICINE

## 2024-01-19 PROCEDURE — 80061 LIPID PANEL: CPT | Performed by: INTERNAL MEDICINE

## 2024-01-19 RX ORDER — RESPIRATORY SYNCYTIAL VIRUS VACCINE 120MCG/0.5
0.5 KIT INTRAMUSCULAR ONCE
Qty: 1 EACH | Refills: 0 | Status: SHIPPED | OUTPATIENT
Start: 2024-01-19 | End: 2024-01-19

## 2024-01-19 RX ORDER — ALPRAZOLAM 0.25 MG
TABLET ORAL
Qty: 30 TABLET | Refills: 1 | Status: SHIPPED | OUTPATIENT
Start: 2024-01-19 | End: 2024-08-23

## 2024-01-19 ASSESSMENT — ENCOUNTER SYMPTOMS
FREQUENCY: 1
FEVER: 0
ABDOMINAL PAIN: 0
HEADACHES: 0
CHILLS: 0
JOINT SWELLING: 0
SHORTNESS OF BREATH: 0
DIARRHEA: 0
ARTHRALGIAS: 0
WEAKNESS: 0
PALPITATIONS: 0
MYALGIAS: 0
DYSURIA: 0
EYE PAIN: 0
DIZZINESS: 0
HEMATURIA: 0
NERVOUS/ANXIOUS: 0
SORE THROAT: 0
CONSTIPATION: 0
COUGH: 0
NAUSEA: 0

## 2024-01-19 ASSESSMENT — ACTIVITIES OF DAILY LIVING (ADL): CURRENT_FUNCTION: NO ASSISTANCE NEEDED

## 2024-01-19 ASSESSMENT — PAIN SCALES - GENERAL: PAINLEVEL: NO PAIN (0)

## 2024-01-19 NOTE — PROGRESS NOTES
"Preventive Care Visit  [facility]  Stephanie Allen MD, Internal Medicine  Jan 19, 2024      SUBJECTIVE:   Lakisha is a 68 year old, presenting for the following:  Wellness Visit and Recheck Medication (PT REPORTS THAT SHE IS HERE FOR HER ANNUAL WELLNESS EXAM. PT REPORTS THAT HER HEARING HAS BEEN A CONCERN FOR ABOUT 2 WEEKS.)    SHE IS ON STEROIDS AND INJECTION STEROIDS AND IS GETTING A mri       DOESN'T WANT TO TAKE ANY DEPRESSION MED       1/19/2024    12:43 PM   Additional Questions   Roomed by [facility]   Accompanied by [facility]         1/19/2024    12:43 PM   Patient Reported Additional Medications   Patient reports taking the following new medications NONE     Are you in the first 12 months of your Medicare coverage?  No    Healthy Habits:     In general, how would you rate your overall health?  Good    Frequency of exercise:  None    Do you usually eat at least 4 servings of fruit and vegetables a day, include whole grains    & fiber and avoid regularly eating high fat or \"junk\" foods?  No    Taking medications regularly:  Yes    Medication side effects:  None    Ability to successfully perform activities of daily living:  No assistance needed    Home Safety:  No safety concerns identified    Hearing Impairment:  Difficulty following a conversation in a noisy restaurant or crowded room, feel that people are mumbling or not speaking clearly, difficulty following dialogue in the theater, difficult to understand a speaker at a public meeting or Protestant service, need to ask people to speak up or repeat themselves, difficulty understanding soft or whispered speech and difficulty understanding speech on the telephone    In the past 6 months, have you been bothered by leaking of urine?  No    In general, how would you rate your overall mental or emotional health?  Good    Additional concerns today:  Yes          Have you ever done Advance Care Planning? (For example, a Health Directive, POLST, or a " discussion with a medical provider or your loved ones about your wishes): Yes, advance care planning is on file.       Fall risk  Fallen 2 or more times in the past year?: No  Any fall with injury in the past year?: No    Cognitive Screening she declined         Reviewed and updated as needed this visit by clinical staff   Tobacco  Allergies  Meds              Reviewed and updated as needed this visit by Provider                  Social History     Tobacco Use    Smoking status: Never    Smokeless tobacco: Never   Substance Use Topics    Alcohol use: Not on file             1/16/2024    10:29 AM   Alcohol Use   Prescreen: >3 drinks/day or >7 drinks/week? No     Do you have a current opioid prescription? No  Do you use any other controlled substances or medications that are not prescribed by a provider? None              Current providers sharing in care for this patient include:   Patient Care Team:  Stephanie Allen MD as PCP - General (Internal Medicine)  Stephanie Allen MD as Assigned PCP    The following health maintenance items are reviewed in Epic and correct as of today:  Health Maintenance   Topic Date Due    DEPRESSION ACTION PLAN  Never done    ZOSTER IMMUNIZATION (1 of 2) Never done    RSV VACCINE (Pregnancy & 60+) (1 - 1-dose 60+ series) Never done    Pneumococcal Vaccine: 65+ Years (1 of 1 - PCV) Never done    ANNUAL REVIEW OF HM ORDERS  08/16/2022    PHQ-9  04/26/2023    INFLUENZA VACCINE (1) 09/01/2023    COVID-19 Vaccine (4 - 2023-24 season) 09/01/2023    MEDICARE ANNUAL WELLNESS VISIT  10/26/2023    MAMMO SCREENING  05/18/2024    DTAP/TDAP/TD IMMUNIZATION (2 - Td or Tdap) 01/08/2025    FALL RISK ASSESSMENT  01/19/2025    LIPID  10/26/2027    ADVANCE CARE PLANNING  10/27/2027    COLORECTAL CANCER SCREENING  01/17/2030    DEXA  09/22/2036    HEPATITIS C SCREENING  Completed    IPV IMMUNIZATION  Aged Out    HPV IMMUNIZATION  Aged Out    MENINGITIS IMMUNIZATION  Aged Out    RSV MONOCLONAL ANTIBODY   Aged Out         FHS-7:       5/18/2022     3:01 PM 10/25/2022     3:14 PM 1/16/2024    10:32 AM   Breast CA Risk Assessment (FHS-7)   Did any of your first-degree relatives have breast or ovarian cancer? No No No   Did any of your relatives have bilateral breast cancer? No No No   Did any man in your family have breast cancer? No No No   Did any woman in your family have breast and ovarian cancer? No No No   Did any woman in your family have breast cancer before age 50 y? No No No   Do you have 2 or more relatives with breast and/or ovarian cancer? No No No   Do you have 2 or more relatives with breast and/or bowel cancer? No No No       Current Outpatient Medications   Medication    ALPRAZolam (XANAX) 0.25 MG tablet    atorvastatin (LIPITOR) 20 MG tablet    hydrochlorothiazide (HYDRODIURIL) 12.5 MG tablet    lisinopril (ZESTRIL) 10 MG tablet    omeprazole (PRILOSEC) 40 MG capsule    primidone (MYSOLINE) 50 MG tablet     No current facility-administered medications for this visit.       Pertinent mammograms are reviewed under the imaging tab.  Review of Systems   Constitutional:  Negative for chills and fever.   HENT:  Positive for hearing loss. Negative for congestion, ear pain and sore throat.    Eyes:  Negative for pain and visual disturbance.   Respiratory:  Negative for cough and shortness of breath.    Cardiovascular:  Negative for chest pain and palpitations.   Gastrointestinal:  Negative for abdominal pain, constipation, diarrhea and nausea.   Genitourinary:  Positive for frequency and urgency. Negative for dysuria, genital sores, hematuria, pelvic pain, vaginal bleeding and vaginal discharge.   Musculoskeletal:  Negative for arthralgias, joint swelling and myalgias.   Skin:  Negative for rash.   Neurological:  Negative for dizziness, weakness and headaches.   Psychiatric/Behavioral:  The patient is not nervous/anxious.         OBJECTIVE:   /70 (BP Location: Left arm, Patient Position: Sitting, Cuff  "Size: Adult Regular)   Pulse 77   Temp 98.6  F (37  C) (Tympanic)   Resp 16   Ht 1.537 m (5' 0.5\")   Wt 66.3 kg (146 lb 1.6 oz)   LMP  (LMP Unknown)   SpO2 98%   Breastfeeding No   BMI 28.06 kg/m     Estimated body mass index is 28.06 kg/m  as calculated from the following:    Height as of this encounter: 1.537 m (5' 0.5\").    Weight as of this encounter: 66.3 kg (146 lb 1.6 oz).  Physical Exam  GENERAL: alert and no distress  NECK: no adenopathy, no asymmetry, masses, or scars  RESP: lungs clear to auscultation - no rales, rhonchi or wheezes  CV: regular rate and rhythm, normal S1 S2, no S3 or S4, no murmur, click or rub, no peripheral edema  ABDOMEN: soft, nontender, no hepatosplenomegaly, no masses and bowel sounds normal  MS: no gross musculoskeletal defects noted, no edema        ASSESSMENT / PLAN:   Need for shingles vaccine    - zoster vaccine recombinant adjuvanted (SHINGRIX) injection; Inject 0.5 mLs into the muscle once for 1 dose Pharmacist administered    Need for vaccination against respiratory syncytial virus    - respiratory syncytial virus vaccine, bivalent (ABRYSVO) injection; Inject 0.5 mLs into the muscle once for 1 dose    Sudden left hearing loss  She is extremely distressed she already was hearing impaired in the other area now has lost her hearing in 1 side and cannot hear at all.  Was given appropriately steroids with no improvement.  Was also given an injection I believe.  At any rate is being closely followed by ENT and is scheduled for an MRI scan.    Benign Essential Hypertension  Controlled    Health care maintenance    Edited:Stephanie Allen MD10/26/2022 1:32 PM     Colon 2020 clear next in ten years .  dexa 2021 normal   Immunization shingrix due   Pap ( has had a hysterectomy )  mammo annual        Current moderate episode of major depressive disorder without prior episode (H)  She is currently not on an SSRI but is not interested right now she obviously is distressed " because of what happened.    Anxiety    - ALPRAZolam (XANAX) 0.25 MG tablet; TAKE 1/2 TABLET BY MOUTH EVERY DAY AS NEEDED    Urinary frequency  She has ongoing significant stress incontinence.  She she is concerned that it was not really effective going to the prior urologist.  He was told she had mild cystocele and surgery was not an option.  And pelvic floor therapy did not really help.  Would recommend seeing your gynecologist to see if she is a surgical candidate.  - Adult Urology  Referral; Future    Female stress incontinence    - Adult Urology  Referral; Future    Mixed hyperlipidemia    He is tolerating statin.  Elevated liver function tests  Mild elevation could be due to the statin.  She has had ultrasound liver that showed fatty liver.  At some point she could get a fibrosis scan but will defer this for now.  She has had hepatitis C and B serology done will complete the workup with some autoimmune serology as well.  - Comprehensive metabolic panel; Future  - Lipid panel reflex to direct LDL Fasting; Future  - TSH; Future  - Hemoglobin A1c; Future  - CBC with platelets; Future  - Anti Nuclear Karen IgG by IFA with Reflex; Future  - F Actin EIA with reflex; Future  - Ferritin; Future  - Iron and iron binding capacity; Future  - Comprehensive metabolic panel  - Lipid panel reflex to direct LDL Fasting  - TSH  - Hemoglobin A1c  - CBC with platelets  - Anti Nuclear Karen IgG by IFA with Reflex  - F Actin EIA with reflex  - Ferritin  - Iron and iron binding capacity    Prediabetes    - TSH; Future  - TSH    Encounter for Medicare annual wellness exam      Thyroid nodule  Slightly prominent thyroid we will get an ultrasound done to rule out nodule  - US Thyroid; Future          Counseling  Reviewed preventive health counseling, as reflected in patient instructions      BMI  Estimated body mass index is 28.06 kg/m  as calculated from the following:    Height as of this encounter: 1.537 m (5'  "0.5\").    Weight as of this encounter: 66.3 kg (146 lb 1.6 oz).         She reports that she has never smoked. She has never used smokeless tobacco.      Appropriate preventive services were discussed with this patient, including applicable screening as appropriate for fall prevention, nutrition, physical activity, Tobacco-use cessation, weight loss and cognition.  Checklist reviewing preventive services available has been given to the patient.    Reviewed patients plan of care and provided an AVS. The Basic Care Plan (routine screening as documented in Health Maintenance) for Lakisha meets the Care Plan requirement. This Care Plan has been established and reviewed with the Patient.        Signed Electronically by: Stephanie Allen MD    Identified Health Risks    Answers submitted by the patient for this visit:  Annual Preventive Visit (Submitted on 1/16/2024)  Chief Complaint: Annual Exam:  Blood in stool: No  heartburn: Yes  peripheral edema: No  mood changes: No  Skin sensation changes: No  tenderness: No  breast mass: No  breast discharge: No    She is at risk for lack of exercise and has been provided with information to increase physical activity for the benefit of her well-being.  The patient was counseled and encouraged to consider modifying their diet and eating habits. She was provided with information on recommended healthy diet options.  The patient was provided with written information regarding signs of hearing loss.  "

## 2024-01-19 NOTE — PATIENT INSTRUCTIONS
"We are repeating liver tests , I am doing more liver tests to rule out other causes of liver disease   Thyroid is mildly enlarged so I would like to get an ultrasound ( non urgent )    I have referred you to urology Dr Duran . She is excellent for pelvic floor . It is good to wait till you get an appointment with her           Patient Education   Personalized Prevention Plan  You are due for the preventive services outlined below.  Your care team is available to assist you in scheduling these services.  If you have already completed any of these items, please share that information with your care team to update in your medical record.  Health Maintenance Due   Topic Date Due    Depression Action Plan  Never done    Zoster (Shingles) Vaccine (1 of 2) Never done    RSV VACCINE (Pregnancy & 60+) (1 - 1-dose 60+ series) Never done    Pneumococcal Vaccine (1 of 1 - PCV) Never done    Depression Assessment  04/26/2023    Flu Vaccine (1) 09/01/2023    COVID-19 Vaccine (4 - 2023-24 season) 09/01/2023    Annual Wellness Visit  10/26/2023     Learning About Being Physically Active  What is physical activity?     Being physically active means doing any kind of activity that gets your body moving.  The types of physical activity that can help you get fit and stay healthy include:  Aerobic or \"cardio\" activities. These make your heart beat faster and make you breathe harder, such as brisk walking, riding a bike, or running. They strengthen your heart and lungs and build up your endurance.  Strength training activities. These make your muscles work against, or \"resist,\" something. Examples include lifting weights or doing push-ups. These activities help tone and strengthen your muscles and bones.  Stretches. These let you move your joints and muscles through their full range of motion. Stretching helps you be more flexible.  Reaching a balance between these three types of physical activity is important because each one " "contributes to your overall fitness.  What are the benefits of being active?  Being active is one of the best things you can do for your health. It helps you to:  Feel stronger and have more energy to do all the things you like to do.  Focus better at school or work.  Feel, think, and sleep better.  Reach and stay at a healthy weight.  Lose fat and build lean muscle.  Lower your risk for serious health problems, including diabetes, heart attack, high blood pressure, and some cancers.  Keep your heart, lungs, bones, muscles, and joints strong and healthy.  How can you make being active part of your life?  Start slowly. Make it your long-term goal to get at least 30 minutes of exercise on most days of the week. Walking is a good choice. You also may want to do other activities, such as running, swimming, cycling, or playing tennis or team sports.  Pick activities that you like--ones that make your heart beat faster, your muscles stronger, and your muscles and joints more flexible. If you find more than one thing you like doing, do them all. You don't have to do the same thing every day.  Get your heart pumping every day. Any activity that makes your heart beat faster and keeps it at that rate for a while counts.  Here are some great ways to get your heart beating faster:  Go for a brisk walk, run, or hike.  Go for a swim or bike ride.  Take an online exercise class or dance.  Play a game of touch football, basketball, or soccer.  Play tennis, pickleball, or racquetball.  Climb stairs.  Even some household chores can be aerobic. Just do them at a faster pace. Raking or mowing the lawn, sweeping the garage, and vacuuming and cleaning your home all can help get your heart rate up.  Strengthen your muscles during the week. You don't have to lift heavy weights or grow big, bulky muscles to get stronger. Doing a few simple activities that make your muscles work against, or \"resist,\" something can help you get stronger. Aim " "for at least twice a week.  For example, you can:  Do push-ups or sit-ups, which use your own body weight as resistance.  Lift weights or dumbbells or use stretch bands at home or in a gym or community center.  Stretch your muscles often. Stretching will help you as you become more active. It can help you stay flexible and loosen tight muscles. It can also help improve your balance and posture and can be a great way to relax.  Be sure to stretch the muscles you'll be using when you work out. It's best to warm your muscles slightly before you stretch them. Walk or do some other light aerobic activity for a few minutes. Then start stretching.  When you stretch your muscles:  Do it slowly. Stretching is not about going fast or making sudden movements.  Don't push or bounce during a stretch.  Hold each stretch for at least 15 to 30 seconds, if you can. You should feel a stretch in the muscle, but not pain.  Breathe out as you do the stretch. Then breathe in as you hold the stretch. Don't hold your breath.  If you're worried about how more activity might affect your health, have a checkup before you start. Follow any special advice your doctor gives you for getting a smart start.  Where can you learn more?  Go to https://www.[employer] [facility].net/patiented  Enter W332 in the search box to learn more about \"Learning About Being Physically Active.\"  Current as of: June 6, 2023               Content Version: 13.8    9700-5860 [employer].   Care instructions adapted under license by your healthcare professional. If you have questions about a medical condition or this instruction, always ask your healthcare professional. [employer] disclaims any warranty or liability for your use of this information.      Learning About Dietary Guidelines  What are the Dietary Guidelines for Americans?     Dietary Guidelines for Americans provide tips for eating well and staying healthy. This helps reduce the risk for " long-term (chronic) diseases.  These guidelines recommend that you:  Eat and drink the right amount for you. The U.[place] government's food guide is called MyPlate. It can help you make your own well-balanced eating plan.  Try to balance your eating with your activity. This helps you stay at a healthy weight.  Drink alcohol in moderation, if at all.  Limit foods high in salt, saturated fat, trans fat, and added sugar.  These guidelines are from the U.S. Department of Agriculture and the U.S. Department of Health and Human Services. They are updated every 5 years.  What is MyPlate?  MyPlate is the U.[place] government's food guide. It can help you make your own well-balanced eating plan. A balanced eating plan means that you eat enough, but not too much, and that your food gives you the nutrients you need to stay healthy.  MyPlate focuses on eating plenty of whole grains, fruits, and vegetables, and on limiting fat and sugar. It is available online at www.ChooseMyPlate.gov.  How can you get started?  If you're trying to eat healthier, you can slowly change your eating habits over time. You don't have to make big changes all at once. Start by adding one or two healthy foods to your meals each day.  Grains  Choose whole-grain breads and cereals and whole-wheat pasta and whole-grain crackers.  Vegetables  Eat a variety of vegetables every day. They have lots of nutrients and are part of a heart-healthy diet.  Fruits  Eat a variety of fruits every day. Fruits contain lots of nutrients. Choose fresh fruit instead of fruit juice.  Protein foods  Choose fish and lean poultry more often. Eat red meat and fried meats less often. Dried beans, tofu, and nuts are also good sources of protein.  Dairy  Choose low-fat or fat-free products from this food group. If you have problems digesting milk, try eating cheese or yogurt instead.  Fats and oils  Limit fats and oils if you're trying to cut calories. Choose healthy fats when you cook.  "These include canola oil and olive oil.  Where can you learn more?  Go to https://www.[employer].net/patiented  Enter D676 in the search box to learn more about \"Learning About Dietary Guidelines.\"  Current as of: February 28, 2023               Content Version: 13.8    4470-4634 [employer].   Care instructions adapted under license by your healthcare professional. If you have questions about a medical condition or this instruction, always ask your healthcare professional. [employer] disclaims any warranty or liability for your use of this information.      Hearing Loss: Care Instructions  Overview     Hearing loss is a sudden or slow decrease in how well you hear. It can range from slight to profound. Permanent hearing loss can occur with aging. It also can happen when you are exposed long-term to loud noise. Examples include listening to loud music, riding motorcycles, or being around other loud machines.  Hearing loss can affect your work and home life. It can make you feel lonely or depressed. You may feel that you have lost your independence. But hearing aids and other devices can help you hear better and feel connected to others.  Follow-up care is a key part of your treatment and safety. Be sure to make and go to all appointments, and call your doctor if you are having problems. It's also a good idea to know your test results and keep a list of the medicines you take.  How can you care for yourself at home?  Avoid loud noises whenever possible. This helps keep your hearing from getting worse.  Always wear hearing protection around loud noises.  Wear a hearing aid as directed.  A professional can help you pick a hearing aid that will work best for you.  You can also get hearing aids over the counter for mild to moderate hearing loss.  Have hearing tests as your doctor suggests. They can show whether your hearing has changed. Your hearing aid may need to be adjusted.  Use other " "devices as needed. These may include:  Telephone amplifiers and hearing aids that can connect to a television, stereo, radio, or microphone.  Devices that use lights or vibrations. These alert you to the doorbell, a ringing telephone, or a baby monitor.  Television closed-captioning. This shows the words at the bottom of the screen. Most new TVs can do this.  TTY (text telephone). This lets you type messages back and forth on the telephone instead of talking or listening. These devices are also called TDD. When messages are typed on the keyboard, they are sent over the phone line to a receiving TTY. The message is shown on a monitor.  Use text messaging, social media, and email if it is hard for you to communicate by telephone.  Try to learn a listening technique called speechreading. It is not lipreading. You pay attention to people's gestures, expressions, posture, and tone of voice. These clues can help you understand what a person is saying. Face the person you are talking to, and have them face you. Make sure the lighting is good. You need to see the other person's face clearly.  Think about counseling if you need help to adjust to your hearing loss.  When should you call for help?  Watch closely for changes in your health, and be sure to contact your doctor if:    You think your hearing is getting worse.     You have new symptoms, such as dizziness or nausea.   Where can you learn more?  Go to https://www.[employer].net/patiented  Enter R798 in the search box to learn more about \"Hearing Loss: Care Instructions.\"  Current as of: February 28, 2023               Content Version: 13.8    3019-7233 [employer].   Care instructions adapted under license by your healthcare professional. If you have questions about a medical condition or this instruction, always ask your healthcare professional. [employer] disclaims any warranty or liability for your use of this information.         "

## 2024-01-20 LAB
ALBUMIN SERPL BCG-MCNC: 4.6 G/DL (ref 3.5–5.2)
ALP SERPL-CCNC: 56 U/L (ref 40–150)
ALT SERPL W P-5'-P-CCNC: 63 U/L (ref 0–50)
ANION GAP SERPL CALCULATED.3IONS-SCNC: 14 MMOL/L (ref 7–15)
AST SERPL W P-5'-P-CCNC: 35 U/L (ref 0–45)
BILIRUB SERPL-MCNC: 0.4 MG/DL
BUN SERPL-MCNC: 17.3 MG/DL (ref 8–23)
CALCIUM SERPL-MCNC: 9.8 MG/DL (ref 8.8–10.2)
CHLORIDE SERPL-SCNC: 94 MMOL/L (ref 98–107)
CHOLEST SERPL-MCNC: 212 MG/DL
CREAT SERPL-MCNC: 0.74 MG/DL (ref 0.51–0.95)
DEPRECATED HCO3 PLAS-SCNC: 27 MMOL/L (ref 22–29)
EGFRCR SERPLBLD CKD-EPI 2021: 88 ML/MIN/1.73M2
FASTING STATUS PATIENT QL REPORTED: ABNORMAL
FERRITIN SERPL-MCNC: 155 NG/ML (ref 11–328)
GLUCOSE SERPL-MCNC: 119 MG/DL (ref 70–99)
HDLC SERPL-MCNC: 99 MG/DL
IRON BINDING CAPACITY (ROCHE): 316 UG/DL (ref 240–430)
IRON SATN MFR SERPL: 60 % (ref 15–46)
IRON SERPL-MCNC: 190 UG/DL (ref 37–145)
LDLC SERPL CALC-MCNC: 79 MG/DL
NONHDLC SERPL-MCNC: 113 MG/DL
POTASSIUM SERPL-SCNC: 4.4 MMOL/L (ref 3.4–5.3)
PROT SERPL-MCNC: 7 G/DL (ref 6.4–8.3)
SODIUM SERPL-SCNC: 135 MMOL/L (ref 135–145)
TRIGL SERPL-MCNC: 170 MG/DL
TSH SERPL DL<=0.005 MIU/L-ACNC: 1.21 UIU/ML (ref 0.3–4.2)

## 2024-01-21 LAB — SMA IGG SER-ACNC: 2 UNITS

## 2024-01-22 LAB
ANA PAT SER IF-IMP: ABNORMAL
ANA SER QL IF: ABNORMAL
ANA TITR SER IF: ABNORMAL {TITER}

## 2024-01-26 ENCOUNTER — HOSPITAL ENCOUNTER (OUTPATIENT)
Dept: ULTRASOUND IMAGING | Facility: CLINIC | Age: 69
Discharge: HOME | End: 2024-01-26
Attending: INTERNAL MEDICINE | Admitting: INTERNAL MEDICINE
Payer: COMMERCIAL

## 2024-01-26 DIAGNOSIS — E04.1 THYROID NODULE: ICD-10-CM

## 2024-01-26 PROCEDURE — 76536 US EXAM OF HEAD AND NECK: CPT

## 2024-01-29 DIAGNOSIS — R73.03 BORDERLINE DIABETES: ICD-10-CM

## 2024-01-29 DIAGNOSIS — K75.81 NASH (NONALCOHOLIC STEATOHEPATITIS): Primary | ICD-10-CM

## 2024-02-20 ENCOUNTER — OFFICE VISIT (OUTPATIENT)
Dept: UROLOGY | Facility: CLINIC | Age: 69
End: 2024-02-20
Attending: INTERNAL MEDICINE
Payer: COMMERCIAL

## 2024-02-20 VITALS
DIASTOLIC BLOOD PRESSURE: 96 MMHG | WEIGHT: 144 LBS | SYSTOLIC BLOOD PRESSURE: 136 MMHG | BODY MASS INDEX: 27.66 KG/M2 | HEART RATE: 76 BPM

## 2024-02-20 DIAGNOSIS — N39.46 MIXED INCONTINENCE: ICD-10-CM

## 2024-02-20 DIAGNOSIS — N95.2 VAGINAL ATROPHY: ICD-10-CM

## 2024-02-20 DIAGNOSIS — R35.0 URINARY FREQUENCY: ICD-10-CM

## 2024-02-20 DIAGNOSIS — R39.15 URGENCY OF URINATION: Primary | ICD-10-CM

## 2024-02-20 PROBLEM — E11.9 DIABETES MELLITUS, TYPE 2 (H): Status: ACTIVE | Noted: 2024-02-20

## 2024-02-20 PROCEDURE — G0463 HOSPITAL OUTPT CLINIC VISIT: HCPCS | Performed by: OBSTETRICS & GYNECOLOGY

## 2024-02-20 PROCEDURE — 99204 OFFICE O/P NEW MOD 45 MIN: CPT | Performed by: OBSTETRICS & GYNECOLOGY

## 2024-02-20 RX ORDER — ESTRADIOL 0.1 MG/G
1 CREAM VAGINAL
Qty: 42.5 G | Refills: 3 | Status: SHIPPED | OUTPATIENT
Start: 2024-02-21

## 2024-02-20 ASSESSMENT — PAIN SCALES - GENERAL: PAINLEVEL: NO PAIN (0)

## 2024-02-20 NOTE — LETTER
2024       RE: Lakisha Cooper  [address]  [facility] [address]     Dear Colleague,    Thank you for referring your patient, Lakisha Cooper, to the [facility] [place] [facility]. Please see a copy of my visit note below.    2024    Referring Provider: Stephanie Allen MD  [address] [place] [address]    Primary Care Provider: Stephanie Allen    CC: Urinary urgency, frequency, Urinary incontinence    HPI:  Lakisha Cooper is a 68 year old   with med hx sig for cHTN, Meniers dz, recent hearing loss ( still under investigation) who presents for evaluation of urinary urgency, frequency and occasional urgency and stress incontinence. She has had vaginal hysterectomy in the past for endometriosis and what sounds like incontinence sling many years ago for stress incontinence.     Recently in the last 3 years , she had had Meniers disease which got better with ear gentamicin injections X 2, and more recently a sudden hearing loss (preceded by a loud noise) that did not respond to steroid injections. She is extremely upset about this and in grief ( very tearful today) as it has disrupted her lifelong plan to take a cruise vacation to [place].     Urinary Symptoms/Voiding function  Voids every 2 hours or so during the day and 1-2 times . Drinks about 60 oz of water and 2 cups of coffee per day. No leaks as long as     Pelvic Organ Prolapse Symptoms  Denies vaginal bulge, pressure sensation or protrusion.      Gastrointestinal Symptoms:  Denies chronic diarrhea, constipation. Denies bothersome fecal or flatal incontinence. Denies defecatory difficulties.     Sexual function/Pelvic floor pain/GYN:   Not sexually active due to partner reasons. No hx of pain with intercourse.       Relevant Medical History:    Diabetes? no  High Blood pressure? cHTN     Recurrent UTIs? no  Sleep Apnea? no  Obesity? Body mass  index is 27.66 kg/m .  History of Blood clots? no  Other medical problems:     Surgical History:      Past Surgical History:   Procedure Laterality Date    BIOPSY BREAST Right     benign    BIOPSY BREAST Right     HYSTERECTOMY      IR CERVICAL EPIDURAL STEROID INJECTION  2012    IR CERVICAL EPIDURAL STEROID INJECTION  10/2/2012    MAMMOPLASTY REDUCTION      OOPHORECTOMY      FL EXCIS THYROID DUCT CYST/SINUS      Description: Thyroglossal Duct Cyst Excision    FL OPEN RX DISTAL RADIUS FX, EXTRA-ARTICULAR      Description: Open Treatment Of Fracture Of Distal Radius       OB/Gyn History:  OB History    Para Term  AB Living   2 2 2 0 0 0   SAB IAB Ectopic Multiple Live Births   0 0 0 0 0      # Outcome Date GA Lbr Dimas/2nd Weight Sex Delivery Anes PTL Lv   2 Term            1 Term                Medications/Vitamins/Supplements:   Current Outpatient Medications   Medication    atorvastatin (LIPITOR) 20 MG tablet    cholecalciferol (VITAMIN D3) 25 mcg (1000 units) capsule    [START ON 2024] estradiol (ESTRACE) 0.1 MG/GM vaginal cream    hydrochlorothiazide (HYDRODIURIL) 12.5 MG tablet    lisinopril (ZESTRIL) 10 MG tablet    omeprazole (PRILOSEC) 40 MG capsule    primidone (MYSOLINE) 50 MG tablet    ALPRAZolam (XANAX) 0.25 MG tablet     No current facility-administered medications for this visit.         Medical History:      Past Medical History:   Diagnosis Date    Anxiety and depression     Bilateral sensorineural hearing loss 2017    Gastritis 2015    Left cervical radiculopathy 10/22/2012     ROS  Social History    Social History     Socioeconomic History    Marital status:      Spouse name: Not on file    Number of children: Not on file    Years of education: Not on file    Highest education level: Not on file   Occupational History    Not on file   Tobacco Use    Smoking status: Never    Smokeless tobacco: Never   Substance and Sexual Activity    Alcohol use: Not on file     Drug use: Not on file    Sexual activity: Not on file   Other Topics Concern    Not on file   Social History Narrative    Not on file     Social Determinants of Health     Financial Resource Strain: Low Risk  (1/16/2024)    Financial Resource Strain     Within the past 12 months, have you or your family members you live with been unable to get utilities (heat, electricity) when it was really needed?: No   Food Insecurity: Low Risk  (1/16/2024)    Food Insecurity     Within the past 12 months, did you worry that your food would run out before you got money to buy more?: No     Within the past 12 months, did the food you bought just not last and you didn t have money to get more?: No   Transportation Needs: Low Risk  (1/16/2024)    Transportation Needs     Within the past 12 months, has lack of transportation kept you from medical appointments, getting your medicines, non-medical meetings or appointments, work, or from getting things that you need?: No   Physical Activity: Not on file   Stress: Not on file   Social Connections: Not on file   Interpersonal Safety: Low Risk  (1/19/2024)    Interpersonal Safety     Do you feel physically and emotionally safe where you currently live?: Yes     Within the past 12 months, have you been hit, slapped, kicked or otherwise physically hurt by someone?: No     Within the past 12 months, have you been humiliated or emotionally abused in other ways by your partner or ex-partner?: No   Housing Stability: Low Risk  (1/16/2024)    Housing Stability     Do you have housing? : Yes     Are you worried about losing your housing?: No       Family History  Family History   Problem Relation Age of Onset    Heart Disease Mother     Heart Disease Maternal Grandmother     Heart Disease Maternal Grandfather        Allergy    No Known Allergies    Current Outpatient Medications   Medication    ALPRAZolam (XANAX) 0.25 MG tablet    atorvastatin (LIPITOR) 20 MG tablet    hydrochlorothiazide  "(HYDRODIURIL) 12.5 MG tablet    lisinopril (ZESTRIL) 10 MG tablet    omeprazole (PRILOSEC) 40 MG capsule    primidone (MYSOLINE) 50 MG tablet     No current facility-administered medications for this visit.       Physical Exam: BP (!) 136/96   Pulse 76   Wt 65.3 kg (144 lb)   LMP  (LMP Unknown)   BMI 27.66 kg/m      LMP  (LMP Unknown)  No LMP recorded (lmp unknown). Patient is postmenopausal. There is no height or weight on file to calculate BMI.    Gen:  is alert, comfortable in no acute distress,   Abdomen: Abdomen is soft, non-tender, non-distended,   Lungs: non-labored breathing.     Pelvic Exam:   Normal external female genitalia. The urethra was Normal.    Vagina: mild atrophy, normal support. No abnormal discharge  Uterus: surgically absent  Ovaries: No palpable mass   Vulva: No lesions, no pain   Rectal: Deferred     Pelvic floor strength: 3/5 kegels.    Pelvic floor muscles: no levator myalgia, normal tone. Able to relax with digital biofeedback    POPQ EXAM FOR PROLAPSE SEVERITY  No prolapse    Voiding trial:    VOID 175 ml  PVR 6 mL by Bladder ultrasound  Leak with Cough stress test : No,   Labs:   No results found for: \"COLOR\", \"APPEARANCE\", \"URINEGLC\", \"URINEBILI\", \"URINEKETONE\", \"SG\", \"URINEPH\", \"PROTEIN\", \"UROBILINOGEN\", \"NITRITE\", \"LEUKEST\"  CBC RESULTS:   Recent Labs   Lab Test 01/19/24  1330   WBC 10.7   RBC 4.98   HGB 14.4   HCT 44.4   MCV 89   MCH 28.9   MCHC 32.4   RDW 13.1            A/P: Lakisha Cooper is a 68 year old F with     Lakisha was seen today for consult.    Diagnoses and all orders for this visit:    Urgency of urination    Urinary frequency  -     Adult Urology  Referral    Mixed incontinence    Vaginal atrophy  -     estradiol (ESTRACE) 0.1 MG/GM vaginal cream; Place 1 g vaginally three times a week Ok to apply with your fingers or with the applicator      She is most bothered by her need to void every 2 hours during the day to avoid leakage. When she does " that , she hardly leaks and doesn't need to use pads. 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     Will treat atrophy with estrace cream  Discussed reducing her caffeine intake from 2 to 1 cup of coffee per day  Discussed the importance of managing stress and anxiety ( which she has quiet a bit currently due to her medical issues) as that could aggravate overactive bladder symptoms. She will start working on this.   Follow up with me virtually in 2-3 months. If symptoms don't improve, she is open to trying pelvic PT and possibly medications if needed.        I spent a total of 45 minutes with  Lakisha Cooper  on the date of the encounter in chart review, face to face patient visit, review of tests, documentation and/or discussion with other providers about the issues documented above.     Eva Duran MD, [facility]  , Department of OBGYN  Female Pelvic Medicine and Reconstructive Surgery ( Urogynecology)  CC  Patient Care Team:  Stephanie Allen MD as PCP - General (Internal Medicine)

## 2024-02-20 NOTE — PROGRESS NOTES
2024    Referring Provider: Stephanie Allen MD  [address] [id number]    Primary Care Provider: Stephanie Allen    CC: Urinary urgency, frequency, Urinary incontinence    HPI:  Lakisha Cooper is a 68 year old   with med hx sig for cHTN, Meniers dz, recent hearing loss ( still under investigation) who presents for evaluation of urinary urgency, frequency and occasional urgency and stress incontinence. She has had vaginal hysterectomy in the past for endometriosis and what sounds like incontinence sling many years ago for stress incontinence.     Recently in the last 3 years , she had had Meniers disease which got better with ear gentamicin injections X 2, and more recently a sudden hearing loss (preceded by a loud noise) that did not respond to steroid injections. She is extremely upset about this and in grief ( very tearful today) as it has disrupted her lifelong plan to take a cruise vacation to [place].     Urinary Symptoms/Voiding function  Voids every 2 hours or so during the day and 1-2 times . Drinks about 60 oz of water and 2 cups of coffee per day. No leaks as long as     Pelvic Organ Prolapse Symptoms  Denies vaginal bulge, pressure sensation or protrusion.      Gastrointestinal Symptoms:  Denies chronic diarrhea, constipation. Denies bothersome fecal or flatal incontinence. Denies defecatory difficulties.     Sexual function/Pelvic floor pain/GYN:   Not sexually active due to partner reasons. No hx of pain with intercourse.       Relevant Medical History:    Diabetes? no  High Blood pressure? cHTN     Recurrent UTIs? no  Sleep Apnea? no  Obesity? Body mass index is 27.66 kg/m .  History of Blood clots? no  Other medical problems:     Surgical History:      Past Surgical History:   Procedure Laterality Date    BIOPSY BREAST Right     benign    BIOPSY BREAST Right     HYSTERECTOMY      IR CERVICAL EPIDURAL STEROID INJECTION  2012    IR CERVICAL EPIDURAL STEROID  INJECTION  10/2/2012    MAMMOPLASTY REDUCTION  2012    OOPHORECTOMY      MI EXCIS THYROID DUCT CYST/SINUS      Description: Thyroglossal Duct Cyst Excision    MI OPEN RX DISTAL RADIUS FX, EXTRA-ARTICULAR      Description: Open Treatment Of Fracture Of Distal Radius       OB/Gyn History:  OB History    Para Term  AB Living   2 2 2 0 0 0   SAB IAB Ectopic Multiple Live Births   0 0 0 0 0      # Outcome Date GA Lbr Dimas/2nd Weight Sex Delivery Anes PTL Lv   2 Term            1 Term                Medications/Vitamins/Supplements:   Current Outpatient Medications   Medication    atorvastatin (LIPITOR) 20 MG tablet    cholecalciferol (VITAMIN D3) 25 mcg (1000 units) capsule    [START ON 2024] estradiol (ESTRACE) 0.1 MG/GM vaginal cream    hydrochlorothiazide (HYDRODIURIL) 12.5 MG tablet    lisinopril (ZESTRIL) 10 MG tablet    omeprazole (PRILOSEC) 40 MG capsule    primidone (MYSOLINE) 50 MG tablet    ALPRAZolam (XANAX) 0.25 MG tablet     No current facility-administered medications for this visit.         Medical History:      Past Medical History:   Diagnosis Date    Anxiety and depression     Bilateral sensorineural hearing loss 2017    Gastritis 2015    Left cervical radiculopathy 10/22/2012     ROS  Social History    Social History     Socioeconomic History    Marital status:      Spouse name: Not on file    Number of children: Not on file    Years of education: Not on file    Highest education level: Not on file   Occupational History    Not on file   Tobacco Use    Smoking status: Never    Smokeless tobacco: Never   Substance and Sexual Activity    Alcohol use: Not on file    Drug use: Not on file    Sexual activity: Not on file   Other Topics Concern    Not on file   Social History Narrative    Not on file     Social Determinants of Health     Financial Resource Strain: Low Risk  (2024)    Financial Resource Strain     Within the past 12 months, have you or your family  members you live with been unable to get utilities (heat, electricity) when it was really needed?: No   Food Insecurity: Low Risk  (1/16/2024)    Food Insecurity     Within the past 12 months, did you worry that your food would run out before you got money to buy more?: No     Within the past 12 months, did the food you bought just not last and you didn t have money to get more?: No   Transportation Needs: Low Risk  (1/16/2024)    Transportation Needs     Within the past 12 months, has lack of transportation kept you from medical appointments, getting your medicines, non-medical meetings or appointments, work, or from getting things that you need?: No   Physical Activity: Not on file   Stress: Not on file   Social Connections: Not on file   Interpersonal Safety: Low Risk  (1/19/2024)    Interpersonal Safety     Do you feel physically and emotionally safe where you currently live?: Yes     Within the past 12 months, have you been hit, slapped, kicked or otherwise physically hurt by someone?: No     Within the past 12 months, have you been humiliated or emotionally abused in other ways by your partner or ex-partner?: No   Housing Stability: Low Risk  (1/16/2024)    Housing Stability     Do you have housing? : Yes     Are you worried about losing your housing?: No       Family History  Family History   Problem Relation Age of Onset    Heart Disease Mother     Heart Disease Maternal Grandmother     Heart Disease Maternal Grandfather        Allergy    No Known Allergies    Current Outpatient Medications   Medication    ALPRAZolam (XANAX) 0.25 MG tablet    atorvastatin (LIPITOR) 20 MG tablet    hydrochlorothiazide (HYDRODIURIL) 12.5 MG tablet    lisinopril (ZESTRIL) 10 MG tablet    omeprazole (PRILOSEC) 40 MG capsule    primidone (MYSOLINE) 50 MG tablet     No current facility-administered medications for this visit.       Physical Exam: BP (!) 136/96   Pulse 76   Wt 65.3 kg (144 lb)   LMP  (LMP Unknown)   BMI 27.66  "kg/m      LMP  (LMP Unknown)  No LMP recorded (lmp unknown). Patient is postmenopausal. There is no height or weight on file to calculate BMI.    Gen:  is alert, comfortable in no acute distress,   Abdomen: Abdomen is soft, non-tender, non-distended,   Lungs: non-labored breathing.     Pelvic Exam:   Normal external female genitalia. The urethra was Normal.    Vagina: mild atrophy, normal support. No abnormal discharge  Uterus: surgically absent  Ovaries: No palpable mass   Vulva: No lesions, no pain   Rectal: Deferred     Pelvic floor strength: 3/5 kegels.    Pelvic floor muscles: no levator myalgia, normal tone. Able to relax with digital biofeedback    POPQ EXAM FOR PROLAPSE SEVERITY  No prolapse    Voiding trial:    VOID 175 ml  PVR 6 mL by Bladder ultrasound  Leak with Cough stress test : No,   Labs:   No results found for: \"COLOR\", \"APPEARANCE\", \"URINEGLC\", \"URINEBILI\", \"URINEKETONE\", \"SG\", \"URINEPH\", \"PROTEIN\", \"UROBILINOGEN\", \"NITRITE\", \"LEUKEST\"  CBC RESULTS:   Recent Labs   Lab Test 01/19/24  1330   WBC 10.7   RBC 4.98   HGB 14.4   HCT 44.4   MCV 89   MCH 28.9   MCHC 32.4   RDW 13.1            A/P: Lakisha Cooper is a 68 year old F with     Lakisha was seen today for consult.    Diagnoses and all orders for this visit:    Urgency of urination    Urinary frequency  -     Adult Urology  Referral    Mixed incontinence    Vaginal atrophy  -     estradiol (ESTRACE) 0.1 MG/GM vaginal cream; Place 1 g vaginally three times a week Ok to apply with your fingers or with the applicator      She is most bothered by her need to void every 2 hours during the day to avoid leakage. When she does that , she hardly leaks and doesn't need to use pads. 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     Will treat atrophy with estrace cream  Discussed reducing her caffeine intake from 2 to 1 cup of coffee per day  Discussed the importance of managing stress and anxiety ( which she has quiet a " bit currently due to her medical issues) as that could aggravate overactive bladder symptoms. She will start working on this.   Follow up with me virtually in 2-3 months. If symptoms don't improve, she is open to trying pelvic PT and possibly medications if needed.        I spent a total of 45 minutes with  Lakisha Cooper  on the date of the encounter in chart review, face to face patient visit, review of tests, documentation and/or discussion with other providers about the issues documented above.     Eva Duran MD, [facility]  , Department of OBGYN  Female Pelvic Medicine and Reconstructive Surgery ( Urogynecology)  CC  Patient Care Team:  Stephanie Allen MD as PCP - General (Internal Medicine)  Stephanie Allen MD as Assigned PCP  Stephanie Allen MD as Referring Physician (Internal Medicine)  Cassie Killian PA-C as Physician Assistant (Urology)  STEPHANIE ALLEN

## 2024-04-01 ENCOUNTER — OFFICE VISIT (OUTPATIENT)
Dept: INTERNAL MEDICINE | Facility: CLINIC | Age: 69
End: 2024-04-01
Payer: COMMERCIAL

## 2024-04-01 VITALS
TEMPERATURE: 97.6 F | RESPIRATION RATE: 16 BRPM | DIASTOLIC BLOOD PRESSURE: 62 MMHG | SYSTOLIC BLOOD PRESSURE: 118 MMHG | OXYGEN SATURATION: 99 % | WEIGHT: 143 LBS | HEIGHT: 61 IN | HEART RATE: 67 BPM | BODY MASS INDEX: 27 KG/M2

## 2024-04-01 DIAGNOSIS — K75.81 NASH (NONALCOHOLIC STEATOHEPATITIS): ICD-10-CM

## 2024-04-01 DIAGNOSIS — D17.30 LIPOMA OF SKIN AND SUBCUTANEOUS TISSUE: ICD-10-CM

## 2024-04-01 DIAGNOSIS — E11.9 TYPE 2 DIABETES MELLITUS WITHOUT COMPLICATION, WITHOUT LONG-TERM CURRENT USE OF INSULIN (H): Primary | ICD-10-CM

## 2024-04-01 DIAGNOSIS — H90.3 BILATERAL SENSORINEURAL HEARING LOSS: ICD-10-CM

## 2024-04-01 DIAGNOSIS — D36.7 BENIGN NEOPLASM OF OTHER SPECIFIED SITES: ICD-10-CM

## 2024-04-01 LAB — HBA1C MFR BLD: 6.1 % (ref 0–5.6)

## 2024-04-01 PROCEDURE — 83036 HEMOGLOBIN GLYCOSYLATED A1C: CPT | Performed by: INTERNAL MEDICINE

## 2024-04-01 PROCEDURE — 99214 OFFICE O/P EST MOD 30 MIN: CPT | Performed by: INTERNAL MEDICINE

## 2024-04-01 PROCEDURE — 36415 COLL VENOUS BLD VENIPUNCTURE: CPT | Performed by: INTERNAL MEDICINE

## 2024-04-01 PROCEDURE — 90677 PCV20 VACCINE IM: CPT | Performed by: INTERNAL MEDICINE

## 2024-04-01 PROCEDURE — G0009 ADMIN PNEUMOCOCCAL VACCINE: HCPCS | Performed by: INTERNAL MEDICINE

## 2024-04-01 RX ORDER — RESPIRATORY SYNCYTIAL VIRUS VACCINE 120MCG/0.5
0.5 KIT INTRAMUSCULAR ONCE
Qty: 1 EACH | Refills: 0 | Status: CANCELLED | OUTPATIENT
Start: 2024-04-01 | End: 2024-04-01

## 2024-04-01 ASSESSMENT — PATIENT HEALTH QUESTIONNAIRE - PHQ9
SUM OF ALL RESPONSES TO PHQ QUESTIONS 1-9: 1
10. IF YOU CHECKED OFF ANY PROBLEMS, HOW DIFFICULT HAVE THESE PROBLEMS MADE IT FOR YOU TO DO YOUR WORK, TAKE CARE OF THINGS AT HOME, OR GET ALONG WITH OTHER PEOPLE: NOT DIFFICULT AT ALL
SUM OF ALL RESPONSES TO PHQ QUESTIONS 1-9: 1

## 2024-04-01 NOTE — PROGRESS NOTES
"  Assessment & Plan     Type 2 diabetes mellitus without complication, without long-term current use of insulin (H)  Diagnosed based on A1c but of note she was on steroids.  Reassured her that she will not need medication we will recheck now the A1c and she can continue diet control  - Hemoglobin A1c; Future  - Hemoglobin A1c    Bilateral sensorineural hearing loss  Going through evaluation for cochlear implants.    REYES (nonalcoholic steatohepatitis)  Is a dental finding mild elevation of liver tests.  Has had an ultrasound in the past hep C hep B autoimmune workup has been negative.  Will check ultrasound elastography with her ultrasound this year.  - US Elastography with Abdomen Complete; Future    Lipoma of skin and subcutaneous tissue  It is a subcutaneous nodule that that is not palpable clearly in the supine position.  It is not a hernia and it is not in the location for lymph node it is also not tender.  She also has a history of lipomas in other parts of the body so we will get an abdominal wall ultrasound to confirm the diagnosis but reassured  - US Elastography with Abdomen Complete; Future  - US Soft Tissue Abdominal Wall or Lower Back; Future    Benign neoplasm of other specified sites    - US Soft Tissue Abdominal Wall or Lower Back; Future            BMI  Estimated body mass index is 27.47 kg/m  as calculated from the following:    Height as of this encounter: 1.537 m (5' 0.5\").    Weight as of this encounter: 64.9 kg (143 lb).             Helen Banuelos is a 68 year old, presenting for the following health issues:  Mass (Left lower quadrant follow up ) and Diabetes (Recheck on labs )      4/1/2024    12:58 PM   Additional Questions   Roomed by KARIE Crane   Accompanied by klaus Keller    History of Present Illness       Reason for visit:  Recheck blood sugars, small mass in left lower quadrant of abdomen    She eats 2-3 servings of fruits and vegetables daily.She consumes 0 sweetened " "beverage(s) daily.She exercises with enough effort to increase her heart rate 60 or more minutes per day.  She exercises with enough effort to increase her heart rate 6 days per week.   She is taking medications regularly.           Current Outpatient Medications   Medication Sig Dispense Refill    atorvastatin (LIPITOR) 20 MG tablet TAKE 1 TABLET BY MOUTH EVERY DAY 90 tablet 2    cholecalciferol (VITAMIN D3) 25 mcg (1000 units) capsule Take 1 capsule by mouth daily      estradiol (ESTRACE) 0.1 MG/GM vaginal cream Place 1 g vaginally three times a week Ok to apply with your fingers or with the applicator 42.5 g 3    hydrochlorothiazide (HYDRODIURIL) 12.5 MG tablet Take 1 tablet (12.5 mg total) by mouth daily. 90 tablet 3    lisinopril (ZESTRIL) 10 MG tablet Take 2 tablets (20 mg total) by mouth daily. 180 tablet 3    omeprazole (PRILOSEC) 40 MG capsule [OMEPRAZOLE (PRILOSEC) 40 MG CAPSULE] Take 40 mg by mouth 2 (two) times a day before meals.      primidone (MYSOLINE) 50 MG tablet Take 1 tablet (50 mg) by mouth At Bedtime 90 tablet 3    ALPRAZolam (XANAX) 0.25 MG tablet TAKE 1/2 TABLET BY MOUTH EVERY DAY AS NEEDED (Patient not taking: Reported on 4/1/2024) 30 tablet 1     No current facility-administered medications for this visit.               Objective    /62 (BP Location: Left arm, Patient Position: Sitting, Cuff Size: Adult Regular)   Pulse 67   Temp 97.6  F (36.4  C) (Tympanic)   Resp 16   Ht 1.537 m (5' 0.5\")   Wt 64.9 kg (143 lb)   LMP  (LMP Unknown)   SpO2 99%   BMI 27.47 kg/m    Body mass index is 27.47 kg/m .  Physical Exam   GENERAL: alert and no distress  NECK: no adenopathy, no asymmetry, masses, or scars  RESP: lungs clear to auscultation - no rales, rhonchi or wheezes  CV: regular rate and rhythm, normal S1 S2, no S3 or S4, no murmur, click or rub, no peripheral edema  ABDOMEN: soft, nontender, no hepatosplenomegaly, no masses and bowel sounds normal  MS: no gross musculoskeletal " defects noted, no edema  Eft lower quadrant abdominal wall superficial soft  spongiform mass of about 3 cm only felt in the standing position not felt in the supine position.          Signed Electronically by: Stephanie Allen MD

## 2024-04-09 NOTE — TELEPHONE ENCOUNTER
Refill Approved    Rx renewed per Medication Renewal Policy. Medication was last renewed on 12/12/19.    Lucrecia Dhillon, [facility] Triage/Med Refill 11/23/2020     Requested Prescriptions   Pending Prescriptions Disp Refills     lisinopriL (PRINIVIL,ZESTRIL) 10 MG tablet [Pharmacy Med Name: lisinopril 10 mg tablet] 180 tablet 3     Sig: Take 2 tablets (20 mg total) by mouth daily.       [facility]ce Inhibitors Refill Protocol Passed - 11/23/2020  8:00 AM        Passed - PCP or prescribing provider visit in past 12 months       Last office visit with prescriber/PCP: 12/12/2019 Comfort Zeng MD OR same dept: 12/12/2019 Comfort Zeng MD OR same specialty: 12/12/2019 Comfort Zeng MD  Last physical: 10/2/2019 Last MTM visit: Visit date not found   Next visit within 3 mo: Visit date not found  Next physical within 3 mo: Visit date not found  Prescriber OR PCP: Comfort Zeng MD  Last diagnosis associated with med order: 1. Benign Essential Hypertension  - lisinopriL (PRINIVIL,ZESTRIL) 10 MG tablet [Pharmacy Med Name: lisinopril 10 mg tablet]; Take 2 tablets (20 mg total) by mouth daily.  Dispense: 180 tablet; Refill: 3    If protocol passes may refill for 12 months if within 3 months of last provider visit (or a total of 15 months).             Passed - Serum Potassium in past 12 months     Lab Results   Component Value Date    Potassium 4.8 09/03/2020             Passed - Blood pressure filed in past 12 months     BP Readings from Last 1 Encounters:   09/03/20 132/80             Passed - Serum Creatinine in past 12 months     Creatinine   Date Value Ref Range Status   09/03/2020 0.72 0.60 - 1.10 mg/dL Final                             chest pain

## 2024-04-18 ENCOUNTER — PATIENT OUTREACH (OUTPATIENT)
Dept: CARE COORDINATION | Facility: CLINIC | Age: 69
End: 2024-04-18
Payer: COMMERCIAL

## 2024-04-22 ENCOUNTER — ANCILLARY PROCEDURE (OUTPATIENT)
Dept: ULTRASOUND IMAGING | Facility: CLINIC | Age: 69
End: 2024-04-22
Attending: INTERNAL MEDICINE
Payer: COMMERCIAL

## 2024-04-22 DIAGNOSIS — E78.2 MIXED HYPERLIPIDEMIA: ICD-10-CM

## 2024-04-22 DIAGNOSIS — K75.81 NASH (NONALCOHOLIC STEATOHEPATITIS): ICD-10-CM

## 2024-04-22 DIAGNOSIS — D17.30 LIPOMA OF SKIN AND SUBCUTANEOUS TISSUE: ICD-10-CM

## 2024-04-22 DIAGNOSIS — D36.7 BENIGN NEOPLASM OF OTHER SPECIFIED SITES: ICD-10-CM

## 2024-04-22 PROCEDURE — 76981 USE PARENCHYMA: CPT | Mod: GC | Performed by: STUDENT IN AN ORGANIZED HEALTH CARE EDUCATION/TRAINING PROGRAM

## 2024-04-22 PROCEDURE — 76705 ECHO EXAM OF ABDOMEN: CPT | Mod: GC | Performed by: STUDENT IN AN ORGANIZED HEALTH CARE EDUCATION/TRAINING PROGRAM

## 2024-04-23 RX ORDER — ATORVASTATIN CALCIUM 20 MG/1
TABLET, FILM COATED ORAL
Qty: 90 TABLET | Refills: 0 | Status: SHIPPED | OUTPATIENT
Start: 2024-04-23 | End: 2024-07-26

## 2024-04-29 ENCOUNTER — ANCILLARY PROCEDURE (OUTPATIENT)
Dept: MAMMOGRAPHY | Facility: CLINIC | Age: 69
End: 2024-04-29
Attending: INTERNAL MEDICINE
Payer: COMMERCIAL

## 2024-04-29 DIAGNOSIS — K43.9 ABDOMINAL WALL HERNIA: Primary | ICD-10-CM

## 2024-04-29 DIAGNOSIS — Z12.31 VISIT FOR SCREENING MAMMOGRAM: ICD-10-CM

## 2024-04-29 PROCEDURE — 77067 SCR MAMMO BI INCL CAD: CPT | Mod: TC | Performed by: STUDENT IN AN ORGANIZED HEALTH CARE EDUCATION/TRAINING PROGRAM

## 2024-04-29 PROCEDURE — 77063 BREAST TOMOSYNTHESIS BI: CPT | Mod: TC | Performed by: STUDENT IN AN ORGANIZED HEALTH CARE EDUCATION/TRAINING PROGRAM

## 2024-04-30 ENCOUNTER — MYC MEDICAL ADVICE (OUTPATIENT)
Dept: SURGERY | Facility: CLINIC | Age: 69
End: 2024-04-30

## 2024-04-30 ENCOUNTER — OFFICE VISIT (OUTPATIENT)
Dept: SURGERY | Facility: CLINIC | Age: 69
End: 2024-04-30
Attending: INTERNAL MEDICINE
Payer: COMMERCIAL

## 2024-04-30 ENCOUNTER — TELEPHONE (OUTPATIENT)
Dept: SURGERY | Facility: CLINIC | Age: 69
End: 2024-04-30

## 2024-04-30 VITALS
HEIGHT: 60 IN | DIASTOLIC BLOOD PRESSURE: 78 MMHG | SYSTOLIC BLOOD PRESSURE: 120 MMHG | WEIGHT: 141.4 LBS | BODY MASS INDEX: 27.76 KG/M2

## 2024-04-30 DIAGNOSIS — K43.9 ABDOMINAL WALL HERNIA: ICD-10-CM

## 2024-04-30 DIAGNOSIS — Z01.818 PREOP EXAM FOR INTERNAL MEDICINE: Primary | ICD-10-CM

## 2024-04-30 DIAGNOSIS — K43.9 SPIGELIAN HERNIA: Primary | ICD-10-CM

## 2024-04-30 PROCEDURE — 99203 OFFICE O/P NEW LOW 30 MIN: CPT | Performed by: SURGERY

## 2024-04-30 RX ORDER — CEFAZOLIN SODIUM/WATER 2 G/20 ML
2 SYRINGE (ML) INTRAVENOUS
Status: CANCELLED | OUTPATIENT
Start: 2024-05-09

## 2024-04-30 RX ORDER — CEFAZOLIN SODIUM/WATER 2 G/20 ML
2 SYRINGE (ML) INTRAVENOUS SEE ADMIN INSTRUCTIONS
Status: CANCELLED | OUTPATIENT
Start: 2024-05-09

## 2024-04-30 RX ORDER — PREDNISONE 10 MG/1
TABLET ORAL
COMMUNITY
Start: 2024-04-22 | End: 2024-05-03

## 2024-04-30 NOTE — PROGRESS NOTES
General Surgery H&P  Lakisha Cooper MRN# [id number]   Age/Sex: 68 year old female YOB: 1955     Reason for visit: Left sided spigelian hernia       Referring physician: Dr. Norman                   Assessment and Plan:   Assessment:  1.  Left-sided spigelian hernia  2.  History of abdominal adhesions with history of lysis of adhesions  3.  History of   4.  History of hard of hearing  5.  History of abdominal plasty    68-year-old female presenting with a left-sided spigelian hernia.  It is reducible.  Patient has no clinical signs of obstruction.  I have explained to the patient that we will proceed with a robotic repair of the left-sided spit Romel hernia.  Patient has a history of lysis of adhesion, as result it is possible that we could encounter lysis of adhesions as well during this procedure.    Plan:  -Schedule the patient for OR for robotic assisted repair of left-sided spigelian hernia with mesh    -I discussed in detail with the patient regarding mesh placement.  I explained the different options of repairing the hernia.  If the hernia was repaired primarily without mesh, there is a higher chance of recurrence.  We discussed in detail using synthetic versus biologic mesh.  I explained to the different scenarios in which each mesh would be more appropriate.  Patient was allowed time to ask questions and concerns regarding mesh placement.  The patient is accepting of the use of mesh with the hernia repair.    - The risks and benefits of the procedure were explained detail to the patient. The risks include infection, bleeding, damage to the surrounding structures. Patient verbalized understanding provided consent to undergo the procedure above.    -Patient will require medical clearance prior to surgery.          Chief Complaint:     Chief Complaint   Patient presents with    Hernia     Abdominal wall hernia- US  @ . Noticed 2 months ago. No pain or discomfort.          History is obtained from the patient    HPI:   Lakisha Cooper is a 68 year old female who presents to the general surgery team today for evaluation of a swelling over the abdomen 2 months ago.  Patient states that it is very minimally discomforting.  Patient has no other complaints.  Patient had an ultrasound showing that she had a hernia.  On evaluation, patient states that she has no nausea no vomiting.  Passing flatus having bowel movements.  Patient has a history of lysis of adhesion in the past.  Patient has history of .  She has no further complaints at this time.          Past Medical History:     Past Medical History:   Diagnosis Date    Anxiety and depression     Bilateral sensorineural hearing loss 2017    Gastritis 2015    Left cervical radiculopathy 10/22/2012              Past Surgical History:     Past Surgical History:   Procedure Laterality Date    BIOPSY BREAST Right     benign    BIOPSY BREAST Right      SECTION      X 2    HERNIA REPAIR      HYSTERECTOMY      IR CERVICAL EPIDURAL STEROID INJECTION  2012    IR CERVICAL EPIDURAL STEROID INJECTION  10/02/2012    MAMMOPLASTY REDUCTION  2012    OOPHORECTOMY      NC EXCIS THYROID DUCT CYST/SINUS      Description: Thyroglossal Duct Cyst Excision    NC OPEN RX DISTAL RADIUS FX, EXTRA-ARTICULAR      Description: Open Treatment Of Fracture Of Distal Radius             Social History:    reports that she has never smoked. She has never used smokeless tobacco. She reports current alcohol use. She reports that she does not use drugs.           Family History:     Family History   Problem Relation Age of Onset    Heart Disease Mother     Colon Cancer Mother     Heart Disease Maternal Grandmother     Heart Disease Maternal Grandfather     Arthritis Father     Hearing Loss Father     Hypertension Sister     Hypertension Brother     Prostate Cancer Brother               Allergies:   No Known Allergies            "Medications:     Prior to Admission medications    Medication Sig Start Date End Date Taking? Authorizing Provider   ALPRAZolam (XANAX) 0.25 MG tablet TAKE 1/2 TABLET BY MOUTH EVERY DAY AS NEEDED 1/19/24  Yes Stephanie Allen MD   atorvastatin (LIPITOR) 20 MG tablet TAKE 1 TABLET BY MOUTH EVERY DAY 4/23/24  Yes Stephanie Allen MD   cholecalciferol (VITAMIN D3) 25 mcg (1000 units) capsule Take 1 capsule by mouth daily 3/1/22  Yes Reported, Patient   estradiol (ESTRACE) 0.1 MG/GM vaginal cream Place 1 g vaginally three times a week Ok to apply with your fingers or with the applicator 2/21/24  Yes Eva Duran MD   hydrochlorothiazide (HYDRODIURIL) 12.5 MG tablet Take 1 tablet (12.5 mg total) by mouth daily. 11/3/23  Yes Stephanie Allen MD   lisinopril (ZESTRIL) 10 MG tablet Take 2 tablets (20 mg total) by mouth daily. 11/3/23  Yes Stephanie Allen MD   omeprazole (PRILOSEC) 40 MG capsule [OMEPRAZOLE (PRILOSEC) 40 MG CAPSULE] Take 40 mg by mouth 2 (two) times a day before meals. 9/3/20  Yes Provider, Historical   predniSONE (DELTASONE) 10 MG tablet Take 6 tabs by mouth daily for 5 days, 4 tabs for 2 days, 2 tabs for 2 days, and one tab for 2 days. 4/22/24  Yes Reported, Patient   primidone (MYSOLINE) 50 MG tablet Take 1 tablet (50 mg) by mouth At Bedtime 8/29/23  Yes Stephanie Allen MD              Review of Systems:   A 12 point Review of Systems is negative other than noted in the HPI            Physical Exam:   Patient Vitals for the past 24 hrs:   BP Height Weight   04/30/24 1121 120/78 1.525 m (5' 0.05\") 64.1 kg (141 lb 6.4 oz)        [unfilled]   Constitutional:   awake, alert, cooperative, no apparent distress, and appears stated age       Eyes:   PERRL, conjunctiva/corneas clear, EOM's intact; no scleral edema or icterus noted        ENT:   Normocephalic, without obvious abnormality, atraumatic, Lips, mucosa, and tongue normal        Hematologic / Lymphatic:   No lymphadenopathy       Lungs:   " Normal respiratory effort, no accessory muscle use       Cardiovascular:   Regular rate and rhythm       Abdomen:   Soft, nondistended, nontender to palpation.  Left-sided spigelian hernia that is reducible.  Approximately 3 cm in size       Musculoskeletal:   No obvious swelling, bruising or deformity       Skin:   Skin color and texture normal for patient, no rashes or lesions              Data:         All imaging studies reviewed by me. I reviewed the images, and I agree with left-sided spigelian hernia      DO Sky Cormier DO  General Surgeon  [facility]  Surgery [address] [address]?  Office: [phone]  Employed by - [facility]  Pager: [phone]

## 2024-04-30 NOTE — LETTER
2024         RE: Lakisha Cooper  [address]  [facility] [address]        Dear Colleague,    Thank you for referring your patient, Lakisha Cooper, to the [facility] [place]. Please see a copy of my visit note below.    General Surgery H&P  Lakisha Cooper MRN# [id number]   Age/Sex: 68 year old female YOB: 1955     Reason for visit: Left sided spigelian hernia       Referring physician: Dr. Norman                   Assessment and Plan:   Assessment:  1.  Left-sided spigelian hernia  2.  History of abdominal adhesions with history of lysis of adhesions  3.  History of   4.  History of hard of hearing  5.  History of abdominal plasty    68-year-old female presenting with a left-sided spigelian hernia.  It is reducible.  Patient has no clinical signs of obstruction.  I have explained to the patient that we will proceed with a robotic repair of the left-sided spit Romel hernia.  Patient has a history of lysis of adhesion, as result it is possible that we could encounter lysis of adhesions as well during this procedure.    Plan:  -Schedule the patient for OR for robotic assisted repair of left-sided spigelian hernia with mesh    -I discussed in detail with the patient regarding mesh placement.  I explained the different options of repairing the hernia.  If the hernia was repaired primarily without mesh, there is a higher chance of recurrence.  We discussed in detail using synthetic versus biologic mesh.  I explained to the different scenarios in which each mesh would be more appropriate.  Patient was allowed time to ask questions and concerns regarding mesh placement.  The patient is accepting of the use of mesh with the hernia repair.    - The risks and benefits of the procedure were explained detail to the patient. The risks include infection, bleeding, damage to the surrounding structures. Patient verbalized understanding provided  consent to undergo the procedure above.    -Patient will require medical clearance prior to surgery.          Chief Complaint:     Chief Complaint   Patient presents with     Hernia     Abdominal wall hernia- US  @ [facility]. Noticed 2 months ago. No pain or discomfort.         History is obtained from the patient    HPI:   Lakisha Cooper is a 68 year old female who presents to the general surgery team today for evaluation of a swelling over the abdomen 2 months ago.  Patient states that it is very minimally discomforting.  Patient has no other complaints.  Patient had an ultrasound showing that she had a hernia.  On evaluation, patient states that she has no nausea no vomiting.  Passing flatus having bowel movements.  Patient has a history of lysis of adhesion in the past.  Patient has history of .  She has no further complaints at this time.          Past Medical History:     Past Medical History:   Diagnosis Date     Anxiety and depression      Bilateral sensorineural hearing loss 2017     Gastritis 2015     Left cervical radiculopathy 10/22/2012              Past Surgical History:     Past Surgical History:   Procedure Laterality Date     BIOPSY BREAST Right     benign     BIOPSY BREAST Right       SECTION      X 2     HERNIA REPAIR       HYSTERECTOMY       IR CERVICAL EPIDURAL STEROID INJECTION  2012     IR CERVICAL EPIDURAL STEROID INJECTION  10/02/2012     MAMMOPLASTY REDUCTION  2012     OOPHORECTOMY       RI EXCIS THYROID DUCT CYST/SINUS      Description: Thyroglossal Duct Cyst Excision     RI OPEN RX DISTAL RADIUS FX, EXTRA-ARTICULAR      Description: Open Treatment Of Fracture Of Distal Radius             Social History:    reports that she has never smoked. She has never used smokeless tobacco. She reports current alcohol use. She reports that she does not use drugs.           Family History:     Family History   Problem Relation Age of Onset     Heart Disease Mother  "     Colon Cancer Mother      Heart Disease Maternal Grandmother      Heart Disease Maternal Grandfather      Arthritis Father      Hearing Loss Father      Hypertension Sister      Hypertension Brother      Prostate Cancer Brother               Allergies:   No Known Allergies           Medications:     Prior to Admission medications    Medication Sig Start Date End Date Taking? Authorizing Provider   ALPRAZolam (XANAX) 0.25 MG tablet TAKE 1/2 TABLET BY MOUTH EVERY DAY AS NEEDED 1/19/24  Yes Stephanie Allen MD   atorvastatin (LIPITOR) 20 MG tablet TAKE 1 TABLET BY MOUTH EVERY DAY 4/23/24  Yes Stephanie Allen MD   cholecalciferol (VITAMIN D3) 25 mcg (1000 units) capsule Take 1 capsule by mouth daily 3/1/22  Yes Reported, Patient   estradiol (ESTRACE) 0.1 MG/GM vaginal cream Place 1 g vaginally three times a week Ok to apply with your fingers or with the applicator 2/21/24  Yes Eva Duran MD   hydrochlorothiazide (HYDRODIURIL) 12.5 MG tablet Take 1 tablet (12.5 mg total) by mouth daily. 11/3/23  Yes Stephanie Allen MD   lisinopril (ZESTRIL) 10 MG tablet Take 2 tablets (20 mg total) by mouth daily. 11/3/23  Yes Stephanie Allen MD   omeprazole (PRILOSEC) 40 MG capsule [OMEPRAZOLE (PRILOSEC) 40 MG CAPSULE] Take 40 mg by mouth 2 (two) times a day before meals. 9/3/20  Yes Provider, Historical   predniSONE (DELTASONE) 10 MG tablet Take 6 tabs by mouth daily for 5 days, 4 tabs for 2 days, 2 tabs for 2 days, and one tab for 2 days. 4/22/24  Yes Reported, Patient   primidone (MYSOLINE) 50 MG tablet Take 1 tablet (50 mg) by mouth At Bedtime 8/29/23  Yes Stephanie Allen MD              Review of Systems:   A 12 point Review of Systems is negative other than noted in the HPI            Physical Exam:   Patient Vitals for the past 24 hrs:   BP Height Weight   04/30/24 1121 120/78 1.525 m (5' 0.05\") 64.1 kg (141 lb 6.4 oz)        [unfilled]   Constitutional:   awake, alert, cooperative, no apparent distress, and " appears stated age       Eyes:   PERRL, conjunctiva/corneas clear, EOM's intact; no scleral edema or icterus noted        ENT:   Normocephalic, without obvious abnormality, atraumatic, Lips, mucosa, and tongue normal        Hematologic / Lymphatic:   No lymphadenopathy       Lungs:   Normal respiratory effort, no accessory muscle use       Cardiovascular:   Regular rate and rhythm       Abdomen:   Soft, nondistended, nontender to palpation.  Left-sided spigelian hernia that is reducible.  Approximately 3 cm in size       Musculoskeletal:   No obvious swelling, bruising or deformity       Skin:   Skin color and texture normal for patient, no rashes or lesions              Data:         All imaging studies reviewed by me. I reviewed the images, and I agree with left-sided spigelian hernia      DO Sky Cormier DO  General Surgeon  [facility]  Surgery [address] [address]?  Office: [phone]  Employed by - [facility]  Pager: [phone]         Again, thank you for allowing me to participate in the care of your patient.        Sincerely,        Sky Guevara DO

## 2024-05-03 ENCOUNTER — OFFICE VISIT (OUTPATIENT)
Dept: FAMILY MEDICINE | Facility: CLINIC | Age: 69
End: 2024-05-03
Payer: COMMERCIAL

## 2024-05-03 VITALS
HEIGHT: 60 IN | OXYGEN SATURATION: 96 % | WEIGHT: 140 LBS | RESPIRATION RATE: 13 BRPM | DIASTOLIC BLOOD PRESSURE: 69 MMHG | BODY MASS INDEX: 27.48 KG/M2 | HEART RATE: 68 BPM | TEMPERATURE: 96.9 F | SYSTOLIC BLOOD PRESSURE: 119 MMHG

## 2024-05-03 DIAGNOSIS — Z01.818 PREOP GENERAL PHYSICAL EXAM: Primary | ICD-10-CM

## 2024-05-03 DIAGNOSIS — K43.9 SPIGELIAN HERNIA: ICD-10-CM

## 2024-05-03 PROBLEM — H81.03 MENIERE'S DISEASE, BILATERAL: Status: ACTIVE | Noted: 2024-05-03

## 2024-05-03 PROBLEM — H81.13 BENIGN PAROXYSMAL POSITIONAL VERTIGO DUE TO BILATERAL VESTIBULAR DISORDER: Status: ACTIVE | Noted: 2024-05-03

## 2024-05-03 PROBLEM — R73.03 PRE-DIABETES: Status: ACTIVE | Noted: 2024-02-20

## 2024-05-03 LAB — HGB BLD-MCNC: 13.5 G/DL (ref 11.7–15.7)

## 2024-05-03 PROCEDURE — 99214 OFFICE O/P EST MOD 30 MIN: CPT | Performed by: FAMILY MEDICINE

## 2024-05-03 PROCEDURE — 36415 COLL VENOUS BLD VENIPUNCTURE: CPT | Performed by: FAMILY MEDICINE

## 2024-05-03 PROCEDURE — 85018 HEMOGLOBIN: CPT | Performed by: FAMILY MEDICINE

## 2024-05-03 RX ORDER — RESPIRATORY SYNCYTIAL VIRUS VACCINE 120MCG/0.5
0.5 KIT INTRAMUSCULAR ONCE
Qty: 1 EACH | Refills: 0 | Status: CANCELLED | OUTPATIENT
Start: 2024-05-03 | End: 2024-05-03

## 2024-05-03 NOTE — PROGRESS NOTES
Preoperative Evaluation  [facility]  [address] [place] [address]  Phone: [phone]  Fax: [phone]  Primary Provider: Stephanie Allen  Pre-op Performing Provider: FELICIA BREEN  May 3, 2024       Lakisha is a 68 year old, presenting for the following:  Pre-Op Exam (Hernia surgery on 5/9/2024 at [facility]) and Recheck Medication            5/3/2024     9:46 AM   Additional Questions   Roomed by law bhakta   Accompanied by alone         5/3/2024     9:46 AM   Patient Reported Additional Medications   Patient reports taking the following new medications no     Surgical Information  Surgery/Procedure: HERNIORRHAPHY LEFT SPIGELIAN -Left  Surgery Location: [facility]  Surgeon: Dr. Guevara  Surgery Date: 5/9/2024  Time of Surgery: 12:30pm  Where patient plans to recover: At home with family  Fax number for surgical facility: Note does not need to be faxed, will be available electronically in Epic.    Assessment & Plan     The proposed surgical procedure is considered INTERMEDIATE risk.    Preop general physical exam  - Hemoglobin    Spigelian hernia  Reason for surgery    Antiplatelet or Anticoagulation Medication Instructions   - Patient is on no antiplatelet or anticoagulation medications.    Additional Medication Instructions  Patient is to take all scheduled medications on the day of surgery    Recommendation  APPROVAL GIVEN to proceed with proposed procedure, without further diagnostic evaluation.      Subjective     HPI related to upcoming procedure:     Sees Dr. Allen   - blood sugars off - working out to improve glucose control   - then got a bulge in a abdomen - referred for surgery - diagnoses with hernia        5/1/2024     8:56 PM   Preop Questions   1. Have you ever had a heart attack or stroke? No   2. Have you ever had surgery on your heart or blood vessels, such as a stent placement, a coronary artery bypass, or surgery on an artery in your head, neck,  "heart, or legs? No   3. Do you have chest pain with activity? No   4. Do you have a history of  heart failure? No   5. Do you currently have a cold, bronchitis or symptoms of other infection? No   6. Do you have a cough, shortness of breath, or wheezing? No   7. Do you or anyone in your family have previous history of blood clots? No   8. Do you or does anyone in your family have a serious bleeding problem such as prolonged bleeding following surgeries or cuts? No   9. Have you ever had problems with anemia or been told to take iron pills? No   10. Have you had any abnormal blood loss such as black, tarry or bloody stools, or abnormal vaginal bleeding? No   11. Have you ever had a blood transfusion? No   12. Are you willing to have a blood transfusion if it is medically needed before, during, or after your surgery? Yes   13. Have you or any of your relatives ever had problems with anesthesia? No   14. Do you have sleep apnea, excessive snoring or daytime drowsiness? No   15. Do you have any artifical heart valves or other implanted medical devices like a pacemaker, defibrillator, or continuous glucose monitor? No   16. Do you have artificial joints? No   17. Are you allergic to latex? No       Health Care Directive  Patient does not have a Health Care Directive or Living Will: Patient states has Advance Directive and will bring in a copy to clinic.    Preoperative Review of    reviewed - no record of controlled substances prescribed.      Status of Chronic Conditions:    Lost hearing January 6th left ear,  Lost ap in right ear already 3 years ago with vertigo, (Meniere's disease  They don't know why she lost her hearing - understands from ENT sometimes it \"Just happens.\"    Hypertension- Well controlled on: current medications    BP Readings from Last 6 Encounters:   05/03/24 119/69   04/30/24 120/78   04/01/24 118/62   02/20/24 (!) 136/96   01/19/24 138/70   10/26/22 (!) 121/91     Hyperlipidemia - " "controlled on atorvastatin    LDL Cholesterol Calculated   Date Value Ref Range Status   01/19/2024 79 <=100 mg/dL Final   10/30/2018 76 <=129 mg/dL Final      Anxiety - uses alprazolam as needed    Benign Familial tremor -0 managed with primidone    Vertigo   - she has diagnoses of both BPPV (she note \"crystals\" in ear and has these adjusted by OT  ) and Meniere's disease .  She sees Dr. Hutchins for the latter - she has had injections , most recently with gentamicin    Social History: 8 siblings - youngest 57, oldest 77 she is in the middle    Patient Active Problem List    Diagnosis Date Noted    Benign paroxysmal positional vertigo due to bilateral vestibular disorder 05/03/2024     Priority: Medium    Meniere's disease, bilateral 05/03/2024     Priority: Medium    Pre-diabetes 02/20/2024     Priority: Medium    Urgency of urination 02/20/2024     Priority: Medium    Urinary frequency 02/20/2024     Priority: Medium    Mixed incontinence 02/20/2024     Priority: Medium    Health care maintenance 09/03/2020     Priority: Medium     Colon 2020 clear next in ten years .  dexa 2021 normal   Immunization shingrix due   Pap ( has had a hysterectomy )  mammo annual         Anxiety 02/08/2019     Priority: Medium    Benign familial tremor      Priority: Medium     Created by Conversion        Current moderate episode of major depressive disorder without prior episode (H)      Priority: Medium     Created by Conversion        Hyperlipidemia      Priority: Medium     Created by Conversion        Sensorineural hearing loss, bilateral 05/11/2017     Priority: Medium    Osteoarthritis Of The Knee      Priority: Medium     Created by Conversion  Replacement Utility updated for latest IMO load    Replacing diagnoses that were inactivated after the 10/1/2021 regulatory import.      Benign Essential Hypertension      Priority: Medium     Created by Conversion          Past Medical History:   Diagnosis Date    Anxiety and " depression     Bilateral sensorineural hearing loss 2017    Cervical Radiculopathy     Created by Conversion  Replacement Utility updated for latest IMO load         Gastritis 2015    Left cervical radiculopathy 10/22/2012    Left cervical radiculopathy 10/22/2012     Past Surgical History:   Procedure Laterality Date    ABDOMINOPLASTY  2012    BIOPSY BREAST Right 2010    benign    BIOPSY BREAST Right      SECTION  1974    and one in 1979    HYSTERECTOMY      IR CERVICAL EPIDURAL STEROID INJECTION  2012    IR CERVICAL EPIDURAL STEROID INJECTION  10/02/2012    MAMMOPLASTY REDUCTION  2012    OOPHORECTOMY      IN EXCIS THYROID DUCT CYST/SINUS      Description: Thyroglossal Duct Cyst Excision    IN OPEN RX DISTAL RADIUS FX, EXTRA-ARTICULAR      Description: Open Treatment Of Fracture Of Distal Radius     Current Outpatient Medications   Medication Sig Dispense Refill    ALPRAZolam (XANAX) 0.25 MG tablet TAKE 1/2 TABLET BY MOUTH EVERY DAY AS NEEDED 30 tablet 1    atorvastatin (LIPITOR) 20 MG tablet TAKE 1 TABLET BY MOUTH EVERY DAY 90 tablet 0    cholecalciferol (VITAMIN D3) 25 mcg (1000 units) capsule Take 1 capsule by mouth daily      estradiol (ESTRACE) 0.1 MG/GM vaginal cream Place 1 g vaginally three times a week Ok to apply with your fingers or with the applicator 42.5 g 3    hydrochlorothiazide (HYDRODIURIL) 12.5 MG tablet Take 1 tablet (12.5 mg total) by mouth daily. 90 tablet 3    lisinopril (ZESTRIL) 10 MG tablet Take 2 tablets (20 mg total) by mouth daily. 180 tablet 3    omeprazole (PRILOSEC) 40 MG capsule [OMEPRAZOLE (PRILOSEC) 40 MG CAPSULE] Take 40 mg by mouth 2 (two) times a day before meals.      primidone (MYSOLINE) 50 MG tablet Take 1 tablet (50 mg) by mouth At Bedtime 90 tablet 3       No Known Allergies     Social History     Tobacco Use    Smoking status: Never    Smokeless tobacco: Never   Substance Use Topics    Alcohol use: Yes     Family History   Problem  Relation Age of Onset    Heart Disease Mother     Colon Cancer Mother     Arthritis Father     Hearing Loss Father     Hypertension Sister     Hypertension Brother     Prostate Cancer Brother     Heart Disease Maternal Grandmother     Heart Disease Maternal Grandfather      History   Drug Use Unknown         Review of Systems    Review of Systems  Constitutional, HEENT, cardiovascular, pulmonary, gi and gu systems are negative, except as otherwise noted.    Objective    /69 (BP Location: Left arm, Patient Position: Sitting, Cuff Size: Adult Regular)   Pulse 68   Temp 96.9  F (36.1  C) (Oral)   Resp 13   Ht 1.524 m (5')   Wt 63.5 kg (140 lb)   LMP  (LMP Unknown)   SpO2 96%   BMI 27.34 kg/m     Estimated body mass index is 27.34 kg/m  as calculated from the following:    Height as of this encounter: 1.524 m (5').    Weight as of this encounter: 63.5 kg (140 lb).  Physical Exam  General appearance - alert, well appearing, and in no distress  Mental status - normal mood, behavior, speech, dress, motor activity, and thought processes  Ears - bilateral TM's and external ear canals normal  Mouth - mucous membranes moist, pharynx normal without lesions  Neck - supple, no significant adenopathy, carotids upstroke normal bilaterally, no bruits, thyroid exam: thyroid is normal in size without nodules or tenderness  Chest - clear to auscultation, no wheezes, rales or rhonchi, symmetric air entry  Heart - normal rate, regular rhythm, normal S1, S2, no murmurs, rubs, clicks or gallops  Abdomen - soft, nontender, nondistended, no masses or organomegaly  Neurological - alert, oriented, normal speech, no focal findings or movement disorder noted, DTR's normal and symmetric  Extremities - peripheral pulses normal, no pedal edema, no clubbing or cyanosis  Skin - no rashes or worrisome lesions      Recent Labs   Lab Test 04/01/24  1358 01/19/24  1330 10/26/22  1407   HGB  --  14.4 14.1   PLT  --  347 296   NA  --  135 137    POTASSIUM  --  4.4 3.9   CR  --  0.74 0.63   A1C 6.1* 6.5*  --         Diagnostics  Recent Results (from the past 24 hour(s))   Hemoglobin    Collection Time: 05/03/24 10:43 AM   Result Value Ref Range    Hemoglobin 13.5 11.7 - 15.7 g/dL      No EKG required for low risk surgery (cataract, skin procedure, breast biopsy, etc).    Revised Cardiac Risk Index (RCRI)  The patient has the following serious cardiovascular risks for perioperative complications:   - No serious cardiac risks = 0 points     RCRI Interpretation: 0 points: Class I (very low risk - 0.4% complication rate)         Signed Electronically by: Ivette Knight MD  Copy of this evaluation report is provided to requesting physician.

## 2024-05-07 NOTE — OR NURSING
Patient is deaf and not able to hear our phone call or messsage. I was able to reach the patient's  and made him aware of the surgery time change. They will arrive at 0530 for her procedure on Thursday.

## 2024-05-08 ENCOUNTER — ANESTHESIA EVENT (OUTPATIENT)
Dept: SURGERY | Facility: HOSPITAL | Age: 69
End: 2024-05-08
Payer: COMMERCIAL

## 2024-05-09 ENCOUNTER — HOSPITAL ENCOUNTER (OUTPATIENT)
Facility: HOSPITAL | Age: 69
Discharge: HOME | End: 2024-05-09
Attending: SURGERY | Admitting: SURGERY
Payer: COMMERCIAL

## 2024-05-09 ENCOUNTER — ANESTHESIA (OUTPATIENT)
Dept: SURGERY | Facility: HOSPITAL | Age: 69
End: 2024-05-09
Payer: COMMERCIAL

## 2024-05-09 VITALS
BODY MASS INDEX: 27.88 KG/M2 | OXYGEN SATURATION: 97 % | WEIGHT: 142 LBS | TEMPERATURE: 97 F | DIASTOLIC BLOOD PRESSURE: 58 MMHG | HEART RATE: 60 BPM | HEIGHT: 60 IN | SYSTOLIC BLOOD PRESSURE: 99 MMHG | RESPIRATION RATE: 16 BRPM

## 2024-05-09 DIAGNOSIS — K43.9 SPIGELIAN HERNIA: Primary | ICD-10-CM

## 2024-05-09 PROCEDURE — 250N000013 HC RX MED GY IP 250 OP 250 PS 637: Performed by: ANESTHESIOLOGY

## 2024-05-09 PROCEDURE — 258N000003 HC RX IP 258 OP 636: Performed by: ANESTHESIOLOGY

## 2024-05-09 PROCEDURE — 250N000011 HC RX IP 250 OP 636: Performed by: ANESTHESIOLOGY

## 2024-05-09 PROCEDURE — 250N000011 HC RX IP 250 OP 636: Performed by: NURSE ANESTHETIST, CERTIFIED REGISTERED

## 2024-05-09 PROCEDURE — 250N000009 HC RX 250: Performed by: ANESTHESIOLOGY

## 2024-05-09 PROCEDURE — 250N000025 HC SEVOFLURANE, PER MIN: Performed by: SURGERY

## 2024-05-09 PROCEDURE — 272N000001 HC OR GENERAL SUPPLY STERILE: Performed by: SURGERY

## 2024-05-09 PROCEDURE — 710N000009 HC RECOVERY PHASE 1, LEVEL 1, PER MIN: Performed by: SURGERY

## 2024-05-09 PROCEDURE — 710N000012 HC RECOVERY PHASE 2, PER MINUTE: Performed by: SURGERY

## 2024-05-09 PROCEDURE — 999N000141 HC STATISTIC PRE-PROCEDURE NURSING ASSESSMENT: Performed by: SURGERY

## 2024-05-09 PROCEDURE — S2900 ROBOTIC SURGICAL SYSTEM: HCPCS | Performed by: SURGERY

## 2024-05-09 PROCEDURE — 250N000009 HC RX 250: Performed by: SURGERY

## 2024-05-09 PROCEDURE — 250N000011 HC RX IP 250 OP 636: Performed by: SURGERY

## 2024-05-09 PROCEDURE — 360N000080 HC SURGERY LEVEL 7, PER MIN: Performed by: SURGERY

## 2024-05-09 PROCEDURE — C1781 MESH (IMPLANTABLE): HCPCS | Performed by: SURGERY

## 2024-05-09 PROCEDURE — 49593 RPR AA HRN 1ST 3-10 RDC: CPT | Performed by: SURGERY

## 2024-05-09 PROCEDURE — 370N000017 HC ANESTHESIA TECHNICAL FEE, PER MIN: Performed by: SURGERY

## 2024-05-09 DEVICE — LAPAROSCOPIC SELF-FIXATING MESH POLYESTER WITH POLYLACTIC ACID GRIPS AND COLLAGEN FILM
Type: IMPLANTABLE DEVICE | Site: ABDOMEN | Status: FUNCTIONAL
Brand: PROGRIP

## 2024-05-09 RX ORDER — FENTANYL CITRATE 50 UG/ML
50 INJECTION, SOLUTION INTRAMUSCULAR; INTRAVENOUS EVERY 5 MIN PRN
Status: DISCONTINUED | OUTPATIENT
Start: 2024-05-09 | End: 2024-05-09 | Stop reason: HOSPADM

## 2024-05-09 RX ORDER — ACETAMINOPHEN 325 MG/1
975 TABLET ORAL ONCE
Status: COMPLETED | OUTPATIENT
Start: 2024-05-09 | End: 2024-05-09

## 2024-05-09 RX ORDER — LIDOCAINE HYDROCHLORIDE 10 MG/ML
INJECTION, SOLUTION INFILTRATION; PERINEURAL PRN
Status: DISCONTINUED | OUTPATIENT
Start: 2024-05-09 | End: 2024-05-09

## 2024-05-09 RX ORDER — ONDANSETRON 2 MG/ML
4 INJECTION INTRAMUSCULAR; INTRAVENOUS EVERY 30 MIN PRN
Status: DISCONTINUED | OUTPATIENT
Start: 2024-05-09 | End: 2024-05-09 | Stop reason: HOSPADM

## 2024-05-09 RX ORDER — PROPOFOL 10 MG/ML
INJECTION, EMULSION INTRAVENOUS CONTINUOUS PRN
Status: DISCONTINUED | OUTPATIENT
Start: 2024-05-09 | End: 2024-05-09

## 2024-05-09 RX ORDER — SODIUM CHLORIDE, SODIUM LACTATE, POTASSIUM CHLORIDE, CALCIUM CHLORIDE 600; 310; 30; 20 MG/100ML; MG/100ML; MG/100ML; MG/100ML
INJECTION, SOLUTION INTRAVENOUS CONTINUOUS
Status: DISCONTINUED | OUTPATIENT
Start: 2024-05-09 | End: 2024-05-09 | Stop reason: HOSPADM

## 2024-05-09 RX ORDER — ONDANSETRON 4 MG/1
4 TABLET, ORALLY DISINTEGRATING ORAL EVERY 30 MIN PRN
Status: DISCONTINUED | OUTPATIENT
Start: 2024-05-09 | End: 2024-05-09 | Stop reason: HOSPADM

## 2024-05-09 RX ORDER — HYDROCODONE BITARTRATE AND ACETAMINOPHEN 5; 325 MG/1; MG/1
1 TABLET ORAL EVERY 6 HOURS PRN
Qty: 10 TABLET | Refills: 0 | Status: SHIPPED | OUTPATIENT
Start: 2024-05-09 | End: 2024-05-12

## 2024-05-09 RX ORDER — CEFAZOLIN SODIUM/WATER 2 G/20 ML
2 SYRINGE (ML) INTRAVENOUS
Status: COMPLETED | OUTPATIENT
Start: 2024-05-09 | End: 2024-05-09

## 2024-05-09 RX ORDER — OXYCODONE HYDROCHLORIDE 5 MG/1
5 TABLET ORAL
Status: DISCONTINUED | OUTPATIENT
Start: 2024-05-09 | End: 2024-05-09 | Stop reason: HOSPADM

## 2024-05-09 RX ORDER — NALOXONE HYDROCHLORIDE 0.4 MG/ML
0.1 INJECTION, SOLUTION INTRAMUSCULAR; INTRAVENOUS; SUBCUTANEOUS
Status: DISCONTINUED | OUTPATIENT
Start: 2024-05-09 | End: 2024-05-09 | Stop reason: HOSPADM

## 2024-05-09 RX ORDER — MEPERIDINE HYDROCHLORIDE 25 MG/ML
12.5 INJECTION INTRAMUSCULAR; INTRAVENOUS; SUBCUTANEOUS EVERY 5 MIN PRN
Status: DISCONTINUED | OUTPATIENT
Start: 2024-05-09 | End: 2024-05-09 | Stop reason: HOSPADM

## 2024-05-09 RX ORDER — PROPOFOL 10 MG/ML
INJECTION, EMULSION INTRAVENOUS PRN
Status: DISCONTINUED | OUTPATIENT
Start: 2024-05-09 | End: 2024-05-09

## 2024-05-09 RX ORDER — DOCUSATE SODIUM 100 MG/1
100 CAPSULE, LIQUID FILLED ORAL 2 TIMES DAILY
Qty: 30 CAPSULE | Refills: 0 | Status: SHIPPED | OUTPATIENT
Start: 2024-05-09 | End: 2024-06-26

## 2024-05-09 RX ORDER — FENTANYL CITRATE 50 UG/ML
25 INJECTION, SOLUTION INTRAMUSCULAR; INTRAVENOUS EVERY 5 MIN PRN
Status: DISCONTINUED | OUTPATIENT
Start: 2024-05-09 | End: 2024-05-09 | Stop reason: HOSPADM

## 2024-05-09 RX ORDER — DEXAMETHASONE SODIUM PHOSPHATE 10 MG/ML
4 INJECTION, SOLUTION INTRAMUSCULAR; INTRAVENOUS
Status: DISCONTINUED | OUTPATIENT
Start: 2024-05-09 | End: 2024-05-09 | Stop reason: HOSPADM

## 2024-05-09 RX ORDER — OXYCODONE HYDROCHLORIDE 5 MG/1
10 TABLET ORAL
Status: DISCONTINUED | OUTPATIENT
Start: 2024-05-09 | End: 2024-05-09 | Stop reason: HOSPADM

## 2024-05-09 RX ORDER — HYDROMORPHONE HCL IN WATER/PF 6 MG/30 ML
0.2 PATIENT CONTROLLED ANALGESIA SYRINGE INTRAVENOUS EVERY 5 MIN PRN
Status: DISCONTINUED | OUTPATIENT
Start: 2024-05-09 | End: 2024-05-09 | Stop reason: HOSPADM

## 2024-05-09 RX ORDER — FENTANYL CITRATE 50 UG/ML
25 INJECTION, SOLUTION INTRAMUSCULAR; INTRAVENOUS
Status: DISCONTINUED | OUTPATIENT
Start: 2024-05-09 | End: 2024-05-09 | Stop reason: HOSPADM

## 2024-05-09 RX ORDER — HYDROMORPHONE HCL IN WATER/PF 6 MG/30 ML
0.4 PATIENT CONTROLLED ANALGESIA SYRINGE INTRAVENOUS EVERY 5 MIN PRN
Status: DISCONTINUED | OUTPATIENT
Start: 2024-05-09 | End: 2024-05-09 | Stop reason: HOSPADM

## 2024-05-09 RX ORDER — CEFAZOLIN SODIUM/WATER 2 G/20 ML
2 SYRINGE (ML) INTRAVENOUS SEE ADMIN INSTRUCTIONS
Status: DISCONTINUED | OUTPATIENT
Start: 2024-05-09 | End: 2024-05-09 | Stop reason: HOSPADM

## 2024-05-09 RX ORDER — MAGNESIUM SULFATE 4 G/50ML
4 INJECTION INTRAVENOUS ONCE
Status: COMPLETED | OUTPATIENT
Start: 2024-05-09 | End: 2024-05-09

## 2024-05-09 RX ORDER — ONDANSETRON 2 MG/ML
INJECTION INTRAMUSCULAR; INTRAVENOUS PRN
Status: DISCONTINUED | OUTPATIENT
Start: 2024-05-09 | End: 2024-05-09

## 2024-05-09 RX ORDER — LIDOCAINE HYDROCHLORIDE AND EPINEPHRINE 10; 10 MG/ML; UG/ML
INJECTION, SOLUTION INFILTRATION; PERINEURAL PRN
Status: DISCONTINUED | OUTPATIENT
Start: 2024-05-09 | End: 2024-05-09 | Stop reason: HOSPADM

## 2024-05-09 RX ORDER — LIDOCAINE 40 MG/G
CREAM TOPICAL
Status: DISCONTINUED | OUTPATIENT
Start: 2024-05-09 | End: 2024-05-09 | Stop reason: HOSPADM

## 2024-05-09 RX ORDER — FENTANYL CITRATE 50 UG/ML
INJECTION, SOLUTION INTRAMUSCULAR; INTRAVENOUS PRN
Status: DISCONTINUED | OUTPATIENT
Start: 2024-05-09 | End: 2024-05-09

## 2024-05-09 RX ADMIN — PROPOFOL 50 MCG/KG/MIN: 10 INJECTION, EMULSION INTRAVENOUS at 07:37

## 2024-05-09 RX ADMIN — ROCURONIUM BROMIDE 50 MG: 50 INJECTION, SOLUTION INTRAVENOUS at 07:30

## 2024-05-09 RX ADMIN — PHENYLEPHRINE HYDROCHLORIDE 100 MCG: 10 INJECTION INTRAVENOUS at 07:45

## 2024-05-09 RX ADMIN — MAGNESIUM SULFATE HEPTAHYDRATE 4 G: 80 INJECTION, SOLUTION INTRAVENOUS at 06:21

## 2024-05-09 RX ADMIN — PROPOFOL 50 MG: 10 INJECTION, EMULSION INTRAVENOUS at 07:50

## 2024-05-09 RX ADMIN — PHENYLEPHRINE HYDROCHLORIDE 100 MCG: 10 INJECTION INTRAVENOUS at 07:42

## 2024-05-09 RX ADMIN — Medication 2 G: at 07:28

## 2024-05-09 RX ADMIN — MIDAZOLAM 2 MG: 1 INJECTION INTRAMUSCULAR; INTRAVENOUS at 07:24

## 2024-05-09 RX ADMIN — PROPOFOL 150 MG: 10 INJECTION, EMULSION INTRAVENOUS at 07:30

## 2024-05-09 RX ADMIN — SODIUM CHLORIDE, POTASSIUM CHLORIDE, SODIUM LACTATE AND CALCIUM CHLORIDE: 600; 310; 30; 20 INJECTION, SOLUTION INTRAVENOUS at 06:17

## 2024-05-09 RX ADMIN — HYDROMORPHONE HYDROCHLORIDE 1 MG: 1 INJECTION, SOLUTION INTRAMUSCULAR; INTRAVENOUS; SUBCUTANEOUS at 08:24

## 2024-05-09 RX ADMIN — SUGAMMADEX 150 MG: 100 INJECTION, SOLUTION INTRAVENOUS at 08:57

## 2024-05-09 RX ADMIN — ACETAMINOPHEN 975 MG: 325 TABLET ORAL at 06:20

## 2024-05-09 RX ADMIN — PHENYLEPHRINE HYDROCHLORIDE 100 MCG: 10 INJECTION INTRAVENOUS at 08:40

## 2024-05-09 RX ADMIN — SODIUM CHLORIDE, POTASSIUM CHLORIDE, SODIUM LACTATE AND CALCIUM CHLORIDE: 600; 310; 30; 20 INJECTION, SOLUTION INTRAVENOUS at 08:45

## 2024-05-09 RX ADMIN — LIDOCAINE HYDROCHLORIDE 50 MG: 10 INJECTION, SOLUTION INFILTRATION; PERINEURAL at 07:30

## 2024-05-09 RX ADMIN — ONDANSETRON 4 MG: 2 INJECTION INTRAMUSCULAR; INTRAVENOUS at 08:48

## 2024-05-09 RX ADMIN — PHENYLEPHRINE HYDROCHLORIDE 150 MCG: 10 INJECTION INTRAVENOUS at 08:47

## 2024-05-09 RX ADMIN — FENTANYL CITRATE 100 MCG: 50 INJECTION INTRAMUSCULAR; INTRAVENOUS at 07:30

## 2024-05-09 ASSESSMENT — ACTIVITIES OF DAILY LIVING (ADL)
ADLS_ACUITY_SCORE: 22

## 2024-05-09 NOTE — H&P
General Surgery H&P  Lakisha Cooper MRN# [id number]   Age/Sex: 68 year old female YOB: 1955     Reason for visit: Left sided spigelian hernia        Referring physician: Dr. Allen                    Assessment and Plan:   Assessment:   Left sided spigelian hernia     Plan:  -  To the OR for robotic repair of the left sided spigelian hernia with mesh  - The risks and benefits of the procedure were explained detail to the patient. The risks include infection, bleeding, damage to the surrounding structures. Patient verbalized understanding provided consent to undergo the procedure above.            Chief Complaint:   Here for surgery     History is obtained from the patient    HPI:   Lakisha Cooper is a 68 year old female who presents for left spigelian hernia repair.  She has no acute changes to her medical hx since the last time we met.  No new complaints.           Past Medical History:     Past Medical History:   Diagnosis Date    Anxiety     Anxiety and depression     Arthritis     Benign familial tremor     Bilateral sensorineural hearing loss 2017    Bilateral sensorineural hearing loss     Cervical Radiculopathy     Created by Conversion  Replacement Utility updated for latest IMO load         Gastritis 2015    HLD (hyperlipidemia)     Hypertension     Left cervical radiculopathy 10/22/2012    Left cervical radiculopathy 10/22/2012    Meniere's disease, bilateral     Spigelian hernia     Vertigo               Past Surgical History:     Past Surgical History:   Procedure Laterality Date    ABDOMINOPLASTY  2012    BIOPSY BREAST Right 2010    benign    BIOPSY BREAST Right      SECTION  1974    and one in     HYSTERECTOMY      IR CERVICAL EPIDURAL STEROID INJECTION  2012    IR CERVICAL EPIDURAL STEROID INJECTION  10/02/2012    MAMMOPLASTY REDUCTION  2012    OOPHORECTOMY      [place] EXCIS THYROID DUCT CYST/SINUS      Description: Thyroglossal Duct Cyst  Excision    [place] OPEN RX DISTAL RADIUS FX, EXTRA-ARTICULAR      Description: Open Treatment Of Fracture Of Distal Radius             Social History:    reports that she has never smoked. She has never used smokeless tobacco. She reports current alcohol use. She reports that she does not use drugs.           Family History:     Family History   Problem Relation Age of Onset    Heart Disease Mother     Colon Cancer Mother     Arthritis Father     Hearing Loss Father     Hypertension Sister     Hypertension Brother     Prostate Cancer Brother     Heart Disease Maternal Grandmother     Heart Disease Maternal Grandfather               Allergies:   No Known Allergies           Medications:     Prior to Admission medications    Medication Sig Start Date End Date Taking? Authorizing Provider   ALPRAZolam (XANAX) 0.25 MG tablet TAKE 1/2 TABLET BY MOUTH EVERY DAY AS NEEDED 1/19/24   Stephanie Allen MD   atorvastatin (LIPITOR) 20 MG tablet TAKE 1 TABLET BY MOUTH EVERY DAY 4/23/24   Stephanie Allen MD   cholecalciferol (VITAMIN D3) 25 mcg (1000 units) capsule Take 1 capsule by mouth daily 3/1/22   Reported, Patient   estradiol (ESTRACE) 0.1 MG/GM vaginal cream Place 1 g vaginally three times a week Ok to apply with your fingers or with the applicator 2/21/24   Eva Duran MD   hydrochlorothiazide (HYDRODIURIL) 12.5 MG tablet Take 1 tablet (12.5 mg total) by mouth daily. 11/3/23   Stephanie Allen MD   lisinopril (ZESTRIL) 10 MG tablet Take 2 tablets (20 mg total) by mouth daily. 11/3/23   Stephanie Allen MD   omeprazole (PRILOSEC) 40 MG capsule [OMEPRAZOLE (PRILOSEC) 40 MG CAPSULE] Take 40 mg by mouth 2 (two) times a day before meals. 9/3/20   Provider, Historical   primidone (MYSOLINE) 50 MG tablet Take 1 tablet (50 mg) by mouth At Bedtime 8/29/23   Stephanie Allen MD              Review of Systems:   A 12 point Review of Systems is negative other than noted in the HPI            Physical Exam:   Patient Vitals for the  past 24 hrs:   BP Temp Temp src Pulse Resp SpO2 Height Weight   05/09/24 0550 125/68 97.8  F (36.6  C) Oral 58 16 96 % -- --   05/09/24 0549 -- -- -- 70 -- -- -- --   05/09/24 0538 -- -- -- -- -- -- 1.524 m (5') 64.4 kg (142 lb)        No intake or output data in the 24 hours ending 05/09/24 0710   Constitutional:   awake, alert, cooperative, no apparent distress, and appears stated age       Eyes:   PERRL, conjunctiva/corneas clear, EOM's intact; no scleral edema or icterus noted        ENT:   Normocephalic, without obvious abnormality, atraumatic, Lips, mucosa, and tongue normal        Hematologic / Lymphatic:   No lymphadenopathy       Lungs:   Normal respiratory effort, no accessory muscle use       Cardiovascular:   Regular rate and rhythm       Abdomen:   Soft, nondistended, nontender to palpation.  Left sided spigelian hernia.        Musculoskeletal:   No obvious swelling, bruising or deformity       Skin:   Skin color and texture normal for patient, no rashes or lesions              Data:            Sky Guevara DO  General Surgeon  [facility]  Surgery [facility] - [address]  [address]  [place] [address]?  Office: [phone]  Employed by - [facility]  Pager: [phone]

## 2024-05-09 NOTE — ANESTHESIA PREPROCEDURE EVALUATION
Anesthesia Pre-Procedure Evaluation    Patient: Laiksha Cooper   MRN: [id number] : 1955        Procedure : Procedure(s):  HERNIORRHAPHY LEFT SPIGELIAN HERNIA WITH MESH ROBOT-ASSISTED, LAPAROSCOPIC, USING DA FROY XI          Past Medical History:   Diagnosis Date    Anxiety     Anxiety and depression     Arthritis     Benign familial tremor     Bilateral sensorineural hearing loss 2017    Bilateral sensorineural hearing loss     Cervical Radiculopathy     Created by Conversion  Replacement Utility updated for latest IMO load         Gastritis 2015    HLD (hyperlipidemia)     Hypertension     Left cervical radiculopathy 10/22/2012    Left cervical radiculopathy 10/22/2012    Meniere's disease, bilateral     Spigelian hernia     Vertigo       Past Surgical History:   Procedure Laterality Date    ABDOMINOPLASTY  2012    BIOPSY BREAST Right 2010    benign    BIOPSY BREAST Right      SECTION  1974    and one in     HYSTERECTOMY      IR CERVICAL EPIDURAL STEROID INJECTION  2012    IR CERVICAL EPIDURAL STEROID INJECTION  10/02/2012    MAMMOPLASTY REDUCTION  2012    OOPHORECTOMY      [place] EXCIS THYROID DUCT CYST/SINUS      Description: Thyroglossal Duct Cyst Excision    [place] OPEN RX DISTAL RADIUS FX, EXTRA-ARTICULAR      Description: Open Treatment Of Fracture Of Distal Radius      No Known Allergies   Social History     Tobacco Use    Smoking status: Never    Smokeless tobacco: Never   Substance Use Topics    Alcohol use: Yes      Wt Readings from Last 1 Encounters:   24 64.4 kg (142 lb)        Anesthesia Evaluation   Pt has had prior anesthetic.     No history of anesthetic complications       ROS/MED HX  ENT/Pulmonary:  - neg pulmonary ROS     Neurologic:    (-) no CVA   Cardiovascular:     (+) Dyslipidemia hypertension- -   -  - -                                      METS/Exercise Tolerance:     Hematologic:  - neg hematologic  ROS     Musculoskeletal:   (+)   "arthritis,             GI/Hepatic:     (+) GERD, Asymptomatic on medication,                  Renal/Genitourinary:  - neg Renal ROS     Endo:  - neg endo ROS     Psychiatric/Substance Use:     (+) psychiatric history anxiety and depression       Infectious Disease:  - neg infectious disease ROS     Malignancy:  - neg malignancy ROS     Other:  - neg other ROS          Physical Exam    Airway  airway exam normal      Mallampati: III   TM distance: > 3 FB   Neck ROM: full   Mouth opening: > 3 cm    Respiratory Devices and Support         Dental  no notable dental history     (+) Minor Abnormalities - some fillings, tiny chips      Cardiovascular   cardiovascular exam normal       Rhythm and rate: regular and normal     Pulmonary   pulmonary exam normal        breath sounds clear to auscultation           OUTSIDE LABS:  CBC:   Lab Results   Component Value Date    WBC 10.7 01/19/2024    WBC 6.5 10/26/2022    HGB 13.5 05/03/2024    HGB 14.4 01/19/2024    HCT 44.4 01/19/2024    HCT 43.1 10/26/2022     01/19/2024     10/26/2022     BMP:   Lab Results   Component Value Date     01/19/2024     10/26/2022    POTASSIUM 4.4 01/19/2024    POTASSIUM 3.9 10/26/2022    CHLORIDE 94 (L) 01/19/2024    CHLORIDE 98 10/26/2022    CO2 27 01/19/2024    CO2 25 10/26/2022    BUN 17.3 01/19/2024    BUN 21.3 10/26/2022    CR 0.74 01/19/2024    CR 0.63 10/26/2022     (H) 01/19/2024     (H) 10/26/2022     COAGS: No results found for: \"PTT\", \"INR\", \"FIBR\"  POC: No results found for: \"BGM\", \"HCG\", \"HCGS\"  HEPATIC:   Lab Results   Component Value Date    ALBUMIN 4.6 01/19/2024    PROTTOTAL 7.0 01/19/2024    ALT 63 (H) 01/19/2024    AST 35 01/19/2024    ALKPHOS 56 01/19/2024    BILITOTAL 0.4 01/19/2024     OTHER:   Lab Results   Component Value Date    A1C 6.1 (H) 04/01/2024    ALFREDITO 9.8 01/19/2024    TSH 1.21 01/19/2024    SED 6 10/30/2018       Anesthesia Plan    ASA Status:  2    NPO Status:  NPO Appropriate "    Anesthesia Type: General.     - Airway: ETT   Induction: Intravenous, Propofol.   Maintenance: Balanced.        Consents    Anesthesia Plan(s) and associated risks, benefits, and realistic alternatives discussed. Questions answered and patient/representative(s) expressed understanding.     - Discussed:     - Discussed with:  Patient            Postoperative Care    Pain management: IV analgesics, Oral pain medications, Multi-modal analgesia.   PONV prophylaxis: Ondansetron (or other 5HT-3), Dexamethasone or Solumedrol, Droperidol or Haldol     Comments:               Enrrique Toledo MD    I have reviewed the pertinent notes and labs in the chart from the past 30 days and (re)examined the patient.  Any updates or changes from those notes are reflected in this note.              # Overweight: Estimated body mass index is 27.73 kg/m  as calculated from the following:    Height as of this encounter: 1.524 m (5').    Weight as of this encounter: 64.4 kg (142 lb).

## 2024-05-09 NOTE — ANESTHESIA POSTPROCEDURE EVALUATION
Patient: Lakisha Cooper    Procedure: Procedure(s):  HERNIORRHAPHY LEFT SPIGELIAN HERNIA WITH MESH ROBOT-ASSISTED, LAPAROSCOPIC, USING DA FROY XI       Anesthesia Type:  General    Note:  Anesthesia Post Evaluation    Last vitals:  Vitals Value Taken Time   /51 05/09/24 0930   Temp 36.3  C (97.4  F) 05/09/24 0938   Pulse 80 05/09/24 0949   Resp 25 05/09/24 0939   SpO2 97 % 05/09/24 1045   Vitals shown include unfiled device data.    Electronically Signed By: Enrrique Toledo MD  May 9, 2024  11:26 AM

## 2024-05-09 NOTE — OP NOTE
"[facility]    Operative Note    Pre-operative diagnosis: Spigelian hernia [K43.9]  Post-operative diagnosis Same as pre-operative diagnosis    Procedure: HERNIORRHAPHY LEFT SPIGELIAN HERNIA WITH MESH ROBOT-ASSISTED, LAPAROSCOPIC, USING DA FROY XI, Left - Abdomen    Surgeon: Surgeons and Role:     * Sky Guevara, DO - Primary  Anesthesia: General   Estimated Blood Loss: 5 mL     Drains: None  Specimens: * No specimens in log *  Findings:     - 3 x 1 cm left-sided spigelian hernia.    Complications: None.  Implants:   Implant Name Type Inv. Item Serial No.  Lot No. LRB No. Used Action   MESH PROGRIP LAPAROSCOPIC 5.9X3.9\" PARIETEX SELF-FIX [id number] - SNA Mesh MESH PROGRIP LAPAROSCOPIC 5.9X3.9\" PARIETEX SELF-FIX [id number] NA COVIDIEN [id number] Left 1 Implanted     Indication: 68-year-old female presenting with abdominal pain and bulge over the left side of the abdomen.  Patient was evaluated found to have a left-sided Spigelian hernia.  After evaluation, offered patient procedure of robotic repair of left-sided spigelian hernia.  The risks and benefits of the procedure were explained detail to the patient. The risks include infection, bleeding, damage to the surrounding structures. Patient verbalized understanding provided consent to undergo the procedure above. I discussed in detail with the patient regarding mesh placement.  I explained the different options of repairing the hernia.  If the hernia was repaired primarily without mesh, there is a higher chance of recurrence.  We discussed in detail using synthetic versus biologic mesh.  I explained to the different scenarios in which each mesh would be more appropriate.  Patient was allowed time to ask questions and concerns regarding mesh placement.  The patient is accepting of the use of mesh with the hernia repair.      Procedure: Patient was brought to the operating room placed on operating table in the supine position.  Patient's " bilateral upper extremities were padded and tucked in the usual fashion.  Patient underwent sedation and intubation by the anesthesia team.  Patient's abdomen was prepped and draped in usual sterile fashion.  Prior to initiating procedure, a timeout was completed.  All present were in agreement.      1% lidocaine with epinephrine was localized over Cee's point.  An 11 blade was used to make a pinpoint skin incision to allow the Veress needle for abdominal access.  Once the Veress needle was intra-abdominal insufflation was initiated.  Once sufficient insufflation was obtained, x3 8 mm ports were placed at the same level supraumbilical, right upper quadrant, left upper quadrant.  Ports were placed under direct visualization.  The robot was then docked.    On general survey, I could identify that the patient had a left-sided spigelian hernia.  No injuries to the abdominal contents upon entry into the abdomen.    The peritoneal flap was then created using a combination of electrocautery as well as sharp dissection with the scissors.  The flap was taken from a left lateral side of the abdomen and extended medially towards the linea alba.  Once the flap was created, the hernia sac was identified and reduced using a combination of gentle traction and also sharp dissection with the scissors as well as electrocautery.  Once the hernia sac was reduced, the hernia defect was measured to be 3 x 1 cm.  The hernia defect was then repaired using a 1-0 permanent V-Loc stitch in a running fashion.    A 10 x 15 cm ProGrip mesh was then inserted and placed over the primary repair.  A 3-0 PDS was then used to anchor the mesh into position.  Once the mesh was in proper position, a 2-0 V lock stitch was then used to close the peritoneal flap.  The trochars were then removed under direct visualization.  There is no identifiable bleeding from any of the trocar sites.  The trocar sites were then closed using a 3-0 vicryl stitch in a  subcuticular fashion.  The surgical incisions were reinforced using surgical glue.      At the end of the procedure a final count was completed.  I was told that all sharps, sponges, instruments were accounted for. The patient had the sedation reversed and was extubated and brought back to the PACU in stable condition.                  Sky Guevara DO  General Surgeon  [facility]  Surgery [address] [address]?  Office: [phone]  Employed by - [facility]  Pager: [phone]

## 2024-05-09 NOTE — DISCHARGE INSTRUCTIONS
Follow up: Please call us at [phone] to schedule an appointment at your convenience.      If you would prefer to follow up with us by phone please let us know so that we may contact you 2-3 weeks following your procedure.         Diet: Regular diet.  Foods high infiber are recommended with 8 to 10 glasses of fluids each day.     Patients can have difficulty with constipation following surgery, due in part to the administration of narcotic medications.  If you are suffering with constipation, you should avoid foods such as hard cheeses or red meat.      Activity: You should continue to be active at home, including ambulating frequently.  If possible try to limit the amount of time spent in bed.     Restrictions: no lifting of more than 10 lbs for the next two weeks.      Wound / drain care: Your incisions are closed using absorbale sutures.  The skin is sealed with a surgical glue.  Do not peal the glue off.  Please allow the glue to peal off on its own.      It is normal to have a small rim of red present around the incisions. This should not, however, extend beyond 1/4 inch from the incision.  If your incisions become increasingly tender, red, or draining, please contact us.        Bathing: You may shower after 24 hours from surgery.  It is ok to get your incisions wet, but avoid rubbing them.  Avoid soaking in bath tubs, or swimming in lakes, pools, or streams for 4 weeks following surgery.

## 2024-05-09 NOTE — ANESTHESIA CARE TRANSFER NOTE
Patient: Lakisha Cooper    Procedure: Procedure(s):  HERNIORRHAPHY LEFT SPIGELIAN HERNIA WITH MESH ROBOT-ASSISTED, LAPAROSCOPIC, USING DA FROY XI       Diagnosis: Spigelian hernia [K43.9]  Diagnosis Additional Information: No value filed.    Anesthesia Type:   General     Note:      Level of Consciousness: awake  Oxygen Supplementation: face mask  Level of Supplemental Oxygen (L/min / FiO2): 6  Independent Airway: airway patency satisfactory and stable    Vital Signs Stable: post-procedure vital signs reviewed and stable  Report to RN Given: handoff report given  Patient transferred to: PACU    Handoff Report: Identifed the Patient, Identified the Reponsible Provider, Reviewed the pertinent medical history, Discussed the surgical course, Reviewed Intra-OP anesthesia mangement and issues during anesthesia, Set expectations for post-procedure period and Allowed opportunity for questions and acknowledgement of understanding      Vitals:  Vitals Value Taken Time   /60 05/09/24 0904   Temp     Pulse 86 05/09/24 0906   Resp 11 05/09/24 0906   SpO2 94 % 05/09/24 0906   Vitals shown include unfiled device data.    Electronically Signed By: DERRICK Clemente CRNA  May 9, 2024  9:08 AM

## 2024-05-09 NOTE — ANESTHESIA PROCEDURE NOTES
Airway       Patient location during procedure: OR       Procedure Start/Stop Times: 5/9/2024 7:33 AM  Staff -        CRNA: Ivette Cm APRN CRNA       Performed By: CRNAIndications and Patient Condition       Indications for airway management: adrian-procedural       Induction type:intravenous       Mask difficulty assessment: 1 - vent by mask    Final Airway Details       Final airway type: endotracheal airway       Successful airway: ETT - single  Endotracheal Airway Details        ETT size (mm): 7.5       Cuffed: yes       Successful intubation technique: direct laryngoscopy       DL Blade Type: MAC 3       Grade View of Cords: 3       Adjucts: stylet       Position: Right       Bite block used: Oral Airway    Post intubation assessment        Placement verified by: capnometry, equal breath sounds and chest rise        Number of attempts at approach: 1       Number of other approaches attempted: 0       Secured with: tape       Ease of procedure: easy       Dentition: Intact       Dental guard used and removed. Dental Guard Type: Standard White.    Medication(s) Administered   Medication Administration Time: 5/9/2024 7:33 AM

## 2024-05-21 ENCOUNTER — OFFICE VISIT (OUTPATIENT)
Dept: SURGERY | Facility: CLINIC | Age: 69
End: 2024-05-21
Payer: COMMERCIAL

## 2024-05-21 VITALS — HEIGHT: 60 IN | BODY MASS INDEX: 27.09 KG/M2 | WEIGHT: 138 LBS

## 2024-05-21 DIAGNOSIS — K43.9 SPIGELIAN HERNIA: Primary | ICD-10-CM

## 2024-05-21 PROCEDURE — 99212 OFFICE O/P EST SF 10 MIN: CPT | Performed by: SURGERY

## 2024-05-21 NOTE — PROGRESS NOTES
General Surgery Clinic - Postop follow up  Lakisha Cooper MRN# [id number]   Age/Sex: 69 year old female YOB: 1955     Reason for visit: Post op visit                           Assessment and Plan:   Assessment:  1.  Spigelian hernia  2.  Hematoma    69-year-old female status post left-sided spigelian hernia repair with mesh on 5/9/2024.  Patient is doing well.  It appears that the patient may have developed a small hematoma over where the actual repair site was    Plan:  -No further acute surgical intervention from my standpoint.  I have explained to the patient to continue monitor that site of bruising.  If there is any acute changes she should contact me immediately.  -Otherwise, the patient will be coming up on 2 weeks postoperatively this Thursday.  She can start to lift weights as tolerated.          Chief Complaint:     Chief Complaint   Patient presents with    Post-Op - General Surgery     Left spigelian hernia repair 5/9        History is obtained from the patient    HPI:   Lakisha Cooper is a 69 year old female who presents to the general surgery team today for evaluation regarding a left-sided spigelian hernia repair done on 5/9/2024.  Patient states that she did great postoperatively.  Minimal pain.  Eating well and good return of bowel function.  Patient states that she felt a twinge at the surgical site.  She saw a little bruising.  But otherwise no further complaints.          Past Medical History:     Past Medical History:   Diagnosis Date    Anxiety     Anxiety and depression     Arthritis     Benign familial tremor     Bilateral sensorineural hearing loss 05/11/2017    Bilateral sensorineural hearing loss     Cervical Radiculopathy     Created by Conversion  Replacement Utility updated for latest IMO load         Gastritis 06/22/2015    HLD (hyperlipidemia)     Hypertension     Left cervical radiculopathy 10/22/2012    Left cervical radiculopathy 10/22/2012    Meniere's  disease, bilateral     Spigelian hernia     Vertigo               Past Surgical History:     Past Surgical History:   Procedure Laterality Date    ABDOMINOPLASTY  2012    BIOPSY BREAST Right 2010    benign    BIOPSY BREAST Right      SECTION  1974    and one in     HERNIORRHAPHY, INCISIONAL, ROBOT-ASSISTED, LAPAROSCOPIC, USING DA FROY XI Left 2024    Procedure: HERNIORRHAPHY LEFT SPIGELIAN HERNIA WITH MESH ROBOT-ASSISTED, LAPAROSCOPIC, USING DA FROY XI;  Surgeon: Sky Guevara DO;  Location: [facility] OR    HYSTERECTOMY      IR CERVICAL EPIDURAL STEROID INJECTION  2012    IR CERVICAL EPIDURAL STEROID INJECTION  10/02/2012    MAMMOPLASTY REDUCTION  2012    OOPHORECTOMY      RI EXCIS THYROID DUCT CYST/SINUS      Description: Thyroglossal Duct Cyst Excision    RI OPEN RX DISTAL RADIUS FX, EXTRA-ARTICULAR      Description: Open Treatment Of Fracture Of Distal Radius             Social History:    reports that she has never smoked. She has never used smokeless tobacco. She reports current alcohol use. She reports that she does not use drugs.           Family History:     Family History   Problem Relation Age of Onset    Heart Disease Mother     Colon Cancer Mother     Arthritis Father     Hearing Loss Father     Hypertension Sister     Hypertension Brother     Prostate Cancer Brother     Heart Disease Maternal Grandmother     Heart Disease Maternal Grandfather               Allergies:   No Known Allergies           Medications:     Prior to Admission medications    Medication Sig Start Date End Date Taking? Authorizing Provider   ALPRAZolam (XANAX) 0.25 MG tablet TAKE 1/2 TABLET BY MOUTH EVERY DAY AS NEEDED 24  Yes Stephanie Allen MD   atorvastatin (LIPITOR) 20 MG tablet TAKE 1 TABLET BY MOUTH EVERY DAY 24  Yes Stephanie Allen MD   cholecalciferol (VITAMIN D3) 25 mcg (1000 units) capsule Take 1 capsule by mouth daily 3/1/22  Yes Reported, Patient   docusate sodium  (COLACE) 100 MG capsule Take 1 capsule (100 mg) by mouth 2 times daily 5/9/24  Yes Sky Guevara DO   estradiol (ESTRACE) 0.1 MG/GM vaginal cream Place 1 g vaginally three times a week Ok to apply with your fingers or with the applicator 2/21/24  Yes Eva Duran MD   hydrochlorothiazide (HYDRODIURIL) 12.5 MG tablet Take 1 tablet (12.5 mg total) by mouth daily. 11/3/23  Yes Stephanie Allen MD   lisinopril (ZESTRIL) 10 MG tablet Take 2 tablets (20 mg total) by mouth daily. 11/3/23  Yes Stephanie Allen MD   omeprazole (PRILOSEC) 40 MG capsule [OMEPRAZOLE (PRILOSEC) 40 MG CAPSULE] Take 40 mg by mouth 2 (two) times a day before meals. 9/3/20  Yes Provider, Melvi   primidone (MYSOLINE) 50 MG tablet Take 1 tablet (50 mg) by mouth At Bedtime 8/29/23  Yes Stephanie Allen MD              Review of Systems:   A 12 point Review of Systems is negative other than noted in the HPI            Physical Exam:   Patient Vitals for the past 24 hrs:   Height Weight   05/21/24 0844 1.524 m (5') 62.6 kg (138 lb)        [unfilled]   Constitutional:   awake, alert, cooperative, no apparent distress, and appears stated age       Eyes:   PERRL, conjunctiva/corneas clear, EOM's intact; no scleral edema or icterus noted        ENT:   Normocephalic, without obvious abnormality, atraumatic, Lips, mucosa, and tongue normal        Hematologic / Lymphatic:   No lymphadenopathy       Lungs:   Normal respiratory effort, no accessory muscle use       Cardiovascular:   Regular rate and rhythm       Abdomen:   Soft, nondistended, nontender to palpation.  Abdomen soft.  No appreciable recurrence of the wound.  I do appreciate a small swelling over the site of old bruising measuring approximately 1 x 1 cm.       Musculoskeletal:   No obvious swelling, bruising or deformity       Skin:   Skin color and texture normal for patient, no rashes or lesions              Data:            DO Sky Cormier DO  General Surgeon  [facility]  [facility]  Surgery [address]  [place] [address]?  Office: [phone]  Employed by - [employer]  Pager: [phone]

## 2024-05-21 NOTE — LETTER
5/21/2024         RE: Lakisha Cooper  [address]  [facility] [address]        Dear Colleague,    Thank you for referring your patient, Lakisha Cooper, to the [facility] [place]. Please see a copy of my visit note below.    General Surgery Clinic - Postop follow up  Lakisha Cooper MRN# [id number]   Age/Sex: 69 year old female YOB: 1955     Reason for visit: Post op visit                           Assessment and Plan:   Assessment:  1.  Spigelian hernia  2.  Hematoma    69-year-old female status post left-sided spigelian hernia repair with mesh on 5/9/2024.  Patient is doing well.  It appears that the patient may have developed a small hematoma over where the actual repair site was    Plan:  -No further acute surgical intervention from my standpoint.  I have explained to the patient to continue monitor that site of bruising.  If there is any acute changes she should contact me immediately.  -Otherwise, the patient will be coming up on 2 weeks postoperatively this Thursday.  She can start to lift weights as tolerated.          Chief Complaint:     Chief Complaint   Patient presents with     Post-Op - General Surgery     Left spigelian hernia repair 5/9        History is obtained from the patient    HPI:   Lakisha Cooper is a 69 year old female who presents to the general surgery team today for evaluation regarding a left-sided spigelian hernia repair done on 5/9/2024.  Patient states that she did great postoperatively.  Minimal pain.  Eating well and good return of bowel function.  Patient states that she felt a twinge at the surgical site.  She saw a little bruising.  But otherwise no further complaints.          Past Medical History:     Past Medical History:   Diagnosis Date     Anxiety      Anxiety and depression      Arthritis      Benign familial tremor      Bilateral sensorineural hearing loss 05/11/2017     Bilateral sensorineural  hearing loss      Cervical Radiculopathy     Created by Conversion  Replacement Utility updated for latest IMO load          Gastritis 2015     HLD (hyperlipidemia)      Hypertension      Left cervical radiculopathy 10/22/2012     Left cervical radiculopathy 10/22/2012     Meniere's disease, bilateral      Spigelian hernia      Vertigo               Past Surgical History:     Past Surgical History:   Procedure Laterality Date     ABDOMINOPLASTY  2012     BIOPSY BREAST Right 2010    benign     BIOPSY BREAST Right       SECTION  1974    and one in      HERNIORRHAPHY, INCISIONAL, ROBOT-ASSISTED, LAPAROSCOPIC, USING DA FROY XI Left 2024    Procedure: HERNIORRHAPHY LEFT SPIGELIAN HERNIA WITH MESH ROBOT-ASSISTED, LAPAROSCOPIC, USING DA FROY XI;  Surgeon: Sky Guevara DO;  Location: [facility] OR     HYSTERECTOMY       IR CERVICAL EPIDURAL STEROID INJECTION  2012     IR CERVICAL EPIDURAL STEROID INJECTION  10/02/2012     MAMMOPLASTY REDUCTION  2012     OOPHORECTOMY       DC EXCIS THYROID DUCT CYST/SINUS      Description: Thyroglossal Duct Cyst Excision     DC OPEN RX DISTAL RADIUS FX, EXTRA-ARTICULAR      Description: Open Treatment Of Fracture Of Distal Radius             Social History:    reports that she has never smoked. She has never used smokeless tobacco. She reports current alcohol use. She reports that she does not use drugs.           Family History:     Family History   Problem Relation Age of Onset     Heart Disease Mother      Colon Cancer Mother      Arthritis Father      Hearing Loss Father      Hypertension Sister      Hypertension Brother      Prostate Cancer Brother      Heart Disease Maternal Grandmother      Heart Disease Maternal Grandfather               Allergies:   No Known Allergies           Medications:     Prior to Admission medications    Medication Sig Start Date End Date Taking? Authorizing Provider   ALPRAZolam (XANAX) 0.25 MG tablet TAKE 1/2  TABLET BY MOUTH EVERY DAY AS NEEDED 1/19/24  Yes Stephanie Allen MD   atorvastatin (LIPITOR) 20 MG tablet TAKE 1 TABLET BY MOUTH EVERY DAY 4/23/24  Yes Stephanie Allen MD   cholecalciferol (VITAMIN D3) 25 mcg (1000 units) capsule Take 1 capsule by mouth daily 3/1/22  Yes Reported, Patient   docusate sodium (COLACE) 100 MG capsule Take 1 capsule (100 mg) by mouth 2 times daily 5/9/24  Yes Sky Guevara,    estradiol (ESTRACE) 0.1 MG/GM vaginal cream Place 1 g vaginally three times a week Ok to apply with your fingers or with the applicator 2/21/24  Yes Eva Duran MD   hydrochlorothiazide (HYDRODIURIL) 12.5 MG tablet Take 1 tablet (12.5 mg total) by mouth daily. 11/3/23  Yes Stephanie Allen MD   lisinopril (ZESTRIL) 10 MG tablet Take 2 tablets (20 mg total) by mouth daily. 11/3/23  Yes Stephanie Allen MD   omeprazole (PRILOSEC) 40 MG capsule [OMEPRAZOLE (PRILOSEC) 40 MG CAPSULE] Take 40 mg by mouth 2 (two) times a day before meals. 9/3/20  Yes Provider, Historical   primidone (MYSOLINE) 50 MG tablet Take 1 tablet (50 mg) by mouth At Bedtime 8/29/23  Yes Stephanie Allen MD              Review of Systems:   A 12 point Review of Systems is negative other than noted in the HPI            Physical Exam:   Patient Vitals for the past 24 hrs:   Height Weight   05/21/24 0844 1.524 m (5') 62.6 kg (138 lb)        [unfilled]   Constitutional:   awake, alert, cooperative, no apparent distress, and appears stated age       Eyes:   PERRL, conjunctiva/corneas clear, EOM's intact; no scleral edema or icterus noted        ENT:   Normocephalic, without obvious abnormality, atraumatic, Lips, mucosa, and tongue normal        Hematologic / Lymphatic:   No lymphadenopathy       Lungs:   Normal respiratory effort, no accessory muscle use       Cardiovascular:   Regular rate and rhythm       Abdomen:   Soft, nondistended, nontender to palpation.  Abdomen soft.  No appreciable recurrence of the wound.  I do appreciate a  small swelling over the site of old bruising measuring approximately 1 x 1 cm.       Musculoskeletal:   No obvious swelling, bruising or deformity       Skin:   Skin color and texture normal for patient, no rashes or lesions              Data:            DO Sky Cormier DO  General Surgeon  [facility]  Surgery [facility] - [address]  [place] [address]?  Office: [phone]  Employed by - [facility] [employer]  Pager: [phone]         Again, thank you for allowing me to participate in the care of your patient.        Sincerely,        Sky Guevara DO

## 2024-06-26 ENCOUNTER — OFFICE VISIT (OUTPATIENT)
Dept: INTERNAL MEDICINE | Facility: CLINIC | Age: 69
End: 2024-06-26
Payer: COMMERCIAL

## 2024-06-26 VITALS
SYSTOLIC BLOOD PRESSURE: 110 MMHG | DIASTOLIC BLOOD PRESSURE: 61 MMHG | HEIGHT: 60 IN | WEIGHT: 135 LBS | HEART RATE: 84 BPM | BODY MASS INDEX: 26.5 KG/M2 | OXYGEN SATURATION: 98 % | TEMPERATURE: 98.1 F | RESPIRATION RATE: 13 BRPM

## 2024-06-26 DIAGNOSIS — Z01.818 PRE-OPERATIVE GENERAL PHYSICAL EXAMINATION: ICD-10-CM

## 2024-06-26 DIAGNOSIS — Z23 NEED FOR SHINGLES VACCINE: ICD-10-CM

## 2024-06-26 DIAGNOSIS — K75.81 METABOLIC DYSFUNCTION-ASSOCIATED STEATOHEPATITIS (MASH): ICD-10-CM

## 2024-06-26 DIAGNOSIS — R42 VERTIGO: ICD-10-CM

## 2024-06-26 DIAGNOSIS — Z29.11 NEED FOR VACCINATION AGAINST RESPIRATORY SYNCYTIAL VIRUS: ICD-10-CM

## 2024-06-26 DIAGNOSIS — E11.9 TYPE 2 DIABETES MELLITUS WITHOUT COMPLICATION, WITHOUT LONG-TERM CURRENT USE OF INSULIN (H): Primary | ICD-10-CM

## 2024-06-26 DIAGNOSIS — R73.03 PRE-DIABETES: ICD-10-CM

## 2024-06-26 DIAGNOSIS — H90.5 SENSORINEURAL HEARING LOSS (SNHL) OF RIGHT EAR, UNSPECIFIED HEARING STATUS ON CONTRALATERAL SIDE: ICD-10-CM

## 2024-06-26 LAB
ERYTHROCYTE [DISTWIDTH] IN BLOOD BY AUTOMATED COUNT: 13.2 % (ref 10–15)
HBA1C MFR BLD: 6.3 % (ref 0–5.6)
HCT VFR BLD AUTO: 40.2 % (ref 35–47)
HGB BLD-MCNC: 13 G/DL (ref 11.7–15.7)
MCH RBC QN AUTO: 28.5 PG (ref 26.5–33)
MCHC RBC AUTO-ENTMCNC: 32.3 G/DL (ref 31.5–36.5)
MCV RBC AUTO: 88 FL (ref 78–100)
PLATELET # BLD AUTO: 332 10E3/UL (ref 150–450)
RBC # BLD AUTO: 4.56 10E6/UL (ref 3.8–5.2)
WBC # BLD AUTO: 5.8 10E3/UL (ref 4–11)

## 2024-06-26 PROCEDURE — 80053 COMPREHEN METABOLIC PANEL: CPT | Performed by: INTERNAL MEDICINE

## 2024-06-26 PROCEDURE — G2211 COMPLEX E/M VISIT ADD ON: HCPCS | Performed by: INTERNAL MEDICINE

## 2024-06-26 PROCEDURE — 83036 HEMOGLOBIN GLYCOSYLATED A1C: CPT | Performed by: INTERNAL MEDICINE

## 2024-06-26 PROCEDURE — 99214 OFFICE O/P EST MOD 30 MIN: CPT | Performed by: INTERNAL MEDICINE

## 2024-06-26 PROCEDURE — 36415 COLL VENOUS BLD VENIPUNCTURE: CPT | Performed by: INTERNAL MEDICINE

## 2024-06-26 PROCEDURE — 85027 COMPLETE CBC AUTOMATED: CPT | Performed by: INTERNAL MEDICINE

## 2024-06-26 RX ORDER — RESPIRATORY SYNCYTIAL VIRUS VACCINE 120MCG/0.5
0.5 KIT INTRAMUSCULAR ONCE
Qty: 1 EACH | Refills: 0 | Status: SHIPPED | OUTPATIENT
Start: 2024-06-26 | End: 2024-06-26

## 2024-06-26 NOTE — PATIENT INSTRUCTIONS
You can take meds before your surgery but it has to be 2 hrs before surgery with sips of water    OR take them after surgery when you are allowed to drink    No fatty meal 8 hours before surgery

## 2024-06-26 NOTE — PROGRESS NOTES
Preoperative Evaluation  [facility]  [address] [place] [address]  Phone: [phone]  Fax: [phone]  Primary Provider: Stephanie Allen MD  Pre-op Performing Provider: Stephanie Allen MD  Jun 26, 2024 6/26/2024   Surgical Information   What procedure is being done? Labyrinthectomy Right ear cochlear implant    Facility or Hospital where procedure/surgery will be performed: [facility]   Who is doing the procedure / surgery? Dr Gordon Service   Date of surgery / procedure: July 11, 2024   Time of surgery / procedure: unknown at this time   Where do you plan to recover after surgery? at home with family        Fax number for surgical facility: Note does not need to be faxed, will be available electronically in Epic.    Assessment & Plan     The proposed surgical procedure is considered INTERMEDIATE risk.    Need for shingles vaccine    - zoster vaccine recombinant adjuvanted (SHINGRIX) injection; Inject 0.5 mLs into the muscle once for 1 dose Pharmacist administered    Need for vaccination against respiratory syncytial virus    - respiratory syncytial virus vaccine, bivalent (ABRYSVO) injection; Inject 0.5 mLs into the muscle once for 1 dose    Type 2 diabetes mellitus without complication, without long-term current use of insulin (H)  Lab Results   Component Value Date    A1C 6.3 06/26/2024    A1C 6.1 04/01/2024    A1C 6.5 01/19/2024     She is on no medication is essentially prediabetic right now.  - CBC with platelets; Future  - Comprehensive metabolic panel; Future  - CBC with platelets  - Comprehensive metabolic panel    Pre-diabetes    - HEMOGLOBIN A1C; Future  - HEMOGLOBIN A1C    Sensorineural hearing loss (SNHL) of right ear, unspecified hearing status on contralateral side  Getting a cochlear implant on the right side she also has Ménière's disease so is going to get the vestibular apparatus also removed.  She has  failed gentamicin injections.    Vertigo      Pre-operative general physical examination              - No identified additional risk factors other than previously addressed         Recommendation  Approval given to proceed with proposed procedure, without further diagnostic evaluation.    Helen Banuelos is a 69 year old, presenting for the following:  Pre-Op Exam (Right Cochlear implant on 7/11/24 with Dr. Evan Hutchins at [facility]  )          6/26/2024     1:21 PM   Additional Questions   Roomed by KARIE Crane   Accompanied by alone         6/26/2024     1:21 PM   Patient Reported Additional Medications   Patient reports taking the following new medications none     HPI related to upcoming procedure: Severe sensory hearing neural loss requiring cochlear implant and Ménière's disease.        6/26/2024   Pre-Op Questionnaire   Have you ever had a heart attack or stroke? No   Have you ever had surgery on your heart or blood vessels, such as a stent placement, a coronary artery bypass, or surgery on an artery in your head, neck, heart, or legs? No   Do you have chest pain with activity? No   Do you have a history of heart failure? No   Do you currently have a cold, bronchitis or symptoms of other infection? No   Do you have a cough, shortness of breath, or wheezing? No   Do you or anyone in your family have previous history of blood clots? No   Do you or does anyone in your family have a serious bleeding problem such as prolonged bleeding following surgeries or cuts? No   Have you ever had problems with anemia or been told to take iron pills? No   Have you had any abnormal blood loss such as black, tarry or bloody stools, or abnormal vaginal bleeding? No   Have you ever had a blood transfusion? (!) YES   Have you ever had a transfusion reaction? No   Are you willing to have a blood transfusion if it is medically needed before, during, or after your surgery? Yes   Have you or any of your relatives ever  had problems with anesthesia? No   Do you have sleep apnea, excessive snoring or daytime drowsiness? No   Do you have any artifical heart valves or other implanted medical devices like a pacemaker, defibrillator, or continuous glucose monitor? No   Do you have artificial joints? No   Are you allergic to latex? No        Health Care Directive  Discussed advance care planning with patient; information given to patient to review.    Preoperative Review of             Patient Active Problem List    Diagnosis Date Noted    Benign paroxysmal positional vertigo due to bilateral vestibular disorder 05/03/2024     Priority: Medium    Meniere's disease, bilateral 05/03/2024     Priority: Medium    Pre-diabetes 02/20/2024     Priority: Medium    Urgency of urination 02/20/2024     Priority: Medium    Urinary frequency 02/20/2024     Priority: Medium    Mixed incontinence 02/20/2024     Priority: Medium    Health care maintenance 09/03/2020     Priority: Medium     Colon 2020 clear next in ten years .  dexa 2021 normal   Immunization shingrix due   Pap ( has had a hysterectomy )  mammo annual         Anxiety 02/08/2019     Priority: Medium    Benign familial tremor      Priority: Medium     Created by Conversion        Current moderate episode of major depressive disorder without prior episode (H)      Priority: Medium     Created by Conversion        Hyperlipidemia      Priority: Medium     Created by Conversion        Sensorineural hearing loss, bilateral 05/11/2017     Priority: Medium    Osteoarthritis Of The Knee      Priority: Medium     Created by Conversion  Replacement Utility updated for latest IMO load    Replacing diagnoses that were inactivated after the 10/1/2021 regulatory import.      Benign Essential Hypertension      Priority: Medium     Created by Conversion          Past Medical History:   Diagnosis Date    Anxiety     Anxiety and depression     Arthritis     Benign familial tremor     Bilateral  sensorineural hearing loss 2017    Bilateral sensorineural hearing loss     Cervical Radiculopathy     Created by Conversion  Replacement Utility updated for latest IMO load         Gastritis 2015    HLD (hyperlipidemia)     Hypertension     Left cervical radiculopathy 10/22/2012    Left cervical radiculopathy 10/22/2012    Meniere's disease, bilateral     Spigelian hernia     Vertigo      Past Surgical History:   Procedure Laterality Date    ABDOMINOPLASTY  2012    BIOPSY BREAST Right 2010    benign    BIOPSY BREAST Right      SECTION  1974    and one in     HERNIORRHAPHY, INCISIONAL, ROBOT-ASSISTED, LAPAROSCOPIC, USING DA FROY XI Left 2024    Procedure: HERNIORRHAPHY LEFT SPIGELIAN HERNIA WITH MESH ROBOT-ASSISTED, LAPAROSCOPIC, USING DA FROY XI;  Surgeon: Sky Guevara DO;  Location: [facility] [place]    HYSTERECTOMY      IR CERVICAL EPIDURAL STEROID INJECTION  2012    IR CERVICAL EPIDURAL STEROID INJECTION  10/02/2012    MAMMOPLASTY REDUCTION  2012    OOPHORECTOMY      UT EXCIS THYROID DUCT CYST/SINUS      Description: Thyroglossal Duct Cyst Excision    UT OPEN RX DISTAL RADIUS FX, EXTRA-ARTICULAR      Description: Open Treatment Of Fracture Of Distal Radius     Current Outpatient Medications   Medication Sig Dispense Refill    ALPRAZolam (XANAX) 0.25 MG tablet TAKE 1/2 TABLET BY MOUTH EVERY DAY AS NEEDED 30 tablet 1    atorvastatin (LIPITOR) 20 MG tablet TAKE 1 TABLET BY MOUTH EVERY DAY 90 tablet 0    cholecalciferol (VITAMIN D3) 25 mcg (1000 units) capsule Take 1 capsule by mouth daily      estradiol (ESTRACE) 0.1 MG/GM vaginal cream Place 1 g vaginally three times a week Ok to apply with your fingers or with the applicator 42.5 g 3    hydrochlorothiazide (HYDRODIURIL) 12.5 MG tablet Take 1 tablet (12.5 mg total) by mouth daily. 90 tablet 3    lisinopril (ZESTRIL) 10 MG tablet Take 2 tablets (20 mg total) by mouth daily. 180 tablet 3    omeprazole (PRILOSEC) 40 MG  capsule [OMEPRAZOLE (PRILOSEC) 40 MG CAPSULE] Take 40 mg by mouth 2 (two) times a day before meals.      primidone (MYSOLINE) 50 MG tablet Take 1 tablet (50 mg) by mouth At Bedtime 90 tablet 3    docusate sodium (COLACE) 100 MG capsule Take 1 capsule (100 mg) by mouth 2 times daily (Patient not taking: Reported on 6/26/2024) 30 capsule 0       No Known Allergies     Social History     Tobacco Use    Smoking status: Never    Smokeless tobacco: Never   Substance Use Topics    Alcohol use: Yes       History   Drug Use Unknown           Current Outpatient Medications   Medication Sig Dispense Refill    ALPRAZolam (XANAX) 0.25 MG tablet TAKE 1/2 TABLET BY MOUTH EVERY DAY AS NEEDED 30 tablet 1    atorvastatin (LIPITOR) 20 MG tablet TAKE 1 TABLET BY MOUTH EVERY DAY 90 tablet 0    cholecalciferol (VITAMIN D3) 25 mcg (1000 units) capsule Take 1 capsule by mouth daily      estradiol (ESTRACE) 0.1 MG/GM vaginal cream Place 1 g vaginally three times a week Ok to apply with your fingers or with the applicator 42.5 g 3    hydrochlorothiazide (HYDRODIURIL) 12.5 MG tablet Take 1 tablet (12.5 mg total) by mouth daily. 90 tablet 3    lisinopril (ZESTRIL) 10 MG tablet Take 2 tablets (20 mg total) by mouth daily. 180 tablet 3    omeprazole (PRILOSEC) 40 MG capsule [OMEPRAZOLE (PRILOSEC) 40 MG CAPSULE] Take 40 mg by mouth 2 (two) times a day before meals.      primidone (MYSOLINE) 50 MG tablet Take 1 tablet (50 mg) by mouth At Bedtime 90 tablet 3    respiratory syncytial virus vaccine, bivalent (ABRYSVO) injection Inject 0.5 mLs into the muscle once for 1 dose 1 each 0    zoster vaccine recombinant adjuvanted (SHINGRIX) injection Inject 0.5 mLs into the muscle once for 1 dose Pharmacist administered 0.5 mL 0     No current facility-administered medications for this visit.         Objective    /61 (BP Location: Left arm, Patient Position: Sitting, Cuff Size: Adult Regular)   Pulse 84   Temp 98.1  F (36.7  C) (Tympanic)   Resp 13    Ht 1.524 m (5')   Wt 61.2 kg (135 lb)   LMP  (LMP Unknown)   SpO2 98%   BMI 26.37 kg/m     Estimated body mass index is 26.37 kg/m  as calculated from the following:    Height as of this encounter: 1.524 m (5').    Weight as of this encounter: 61.2 kg (135 lb).  Physical Exam  GENERAL: alert and no distress  NECK: no adenopathy, no asymmetry, masses, or scars  RESP: lungs clear to auscultation - no rales, rhonchi or wheezes  CV: regular rate and rhythm, normal S1 S2, no S3 or S4, no murmur, click or rub, no peripheral edema  MS: no gross musculoskeletal defects noted, no edema    Recent Labs   Lab Test 05/03/24  1043 04/01/24  1358 01/19/24  1330   HGB 13.5  --  14.4   PLT  --   --  347   NA  --   --  135   POTASSIUM  --   --  4.4   CR  --   --  0.74   A1C  --  6.1* 6.5*        Diagnostics  Recent Results (from the past 720 hour(s))   HEMOGLOBIN A1C    Collection Time: 06/26/24  1:55 PM   Result Value Ref Range    Hemoglobin A1C 6.3 (H) 0.0 - 5.6 %   CBC with platelets    Collection Time: 06/26/24  1:55 PM   Result Value Ref Range    WBC Count 5.8 4.0 - 11.0 10e3/uL    RBC Count 4.56 3.80 - 5.20 10e6/uL    Hemoglobin 13.0 11.7 - 15.7 g/dL    Hematocrit 40.2 35.0 - 47.0 %    MCV 88 78 - 100 fL    MCH 28.5 26.5 - 33.0 pg    MCHC 32.3 31.5 - 36.5 g/dL    RDW 13.2 10.0 - 15.0 %    Platelet Count 332 150 - 450 10e3/uL      No EKG this visit, completed in the last 90 days.    Revised Cardiac Risk Index (RCRI)  The patient has the following serious cardiovascular risks for perioperative complications:   - No serious cardiac risks = 0 points     RCRI Interpretation: 0 points: Class I (very low risk - 0.4% complication rate)         Signed Electronically by: Stephanie Allen MD  Copy of this evaluation report is provided to requesting physician.

## 2024-06-27 LAB
ALBUMIN SERPL BCG-MCNC: 4.6 G/DL (ref 3.5–5.2)
ALP SERPL-CCNC: 60 U/L (ref 40–150)
ALT SERPL W P-5'-P-CCNC: 39 U/L (ref 0–50)
ANION GAP SERPL CALCULATED.3IONS-SCNC: 12 MMOL/L (ref 7–15)
AST SERPL W P-5'-P-CCNC: 38 U/L (ref 0–45)
BILIRUB SERPL-MCNC: 0.5 MG/DL
BUN SERPL-MCNC: 15 MG/DL (ref 8–23)
CALCIUM SERPL-MCNC: 9.8 MG/DL (ref 8.8–10.2)
CHLORIDE SERPL-SCNC: 100 MMOL/L (ref 98–107)
CREAT SERPL-MCNC: 0.75 MG/DL (ref 0.51–0.95)
DEPRECATED HCO3 PLAS-SCNC: 28 MMOL/L (ref 22–29)
EGFRCR SERPLBLD CKD-EPI 2021: 86 ML/MIN/1.73M2
GLUCOSE SERPL-MCNC: 110 MG/DL (ref 70–99)
POTASSIUM SERPL-SCNC: 4.2 MMOL/L (ref 3.4–5.3)
PROT SERPL-MCNC: 6.9 G/DL (ref 6.4–8.3)
SODIUM SERPL-SCNC: 140 MMOL/L (ref 135–145)

## 2024-07-26 DIAGNOSIS — E78.2 MIXED HYPERLIPIDEMIA: ICD-10-CM

## 2024-07-26 RX ORDER — ATORVASTATIN CALCIUM 20 MG/1
TABLET, FILM COATED ORAL
Qty: 30 TABLET | Refills: 2 | Status: SHIPPED | OUTPATIENT
Start: 2024-07-26

## 2024-08-23 DIAGNOSIS — F41.9 ANXIETY: ICD-10-CM

## 2024-08-23 RX ORDER — ALPRAZOLAM 0.25 MG
TABLET ORAL
Qty: 30 TABLET | Refills: 1 | Status: SHIPPED | OUTPATIENT
Start: 2024-08-23

## 2024-08-24 DIAGNOSIS — G25.0 BENIGN FAMILIAL TREMOR: ICD-10-CM

## 2024-08-24 DIAGNOSIS — Z76.0 ENCOUNTER FOR MEDICATION REFILL: ICD-10-CM

## 2024-08-26 RX ORDER — PRIMIDONE 50 MG/1
50 TABLET ORAL AT BEDTIME
Qty: 90 TABLET | Refills: 3 | Status: SHIPPED | OUTPATIENT
Start: 2024-08-26

## 2024-10-23 DIAGNOSIS — E78.2 MIXED HYPERLIPIDEMIA: ICD-10-CM

## 2024-10-23 DIAGNOSIS — I10 ESSENTIAL HYPERTENSION: ICD-10-CM

## 2024-10-23 DIAGNOSIS — Z76.0 ENCOUNTER FOR MEDICATION REFILL: ICD-10-CM

## 2024-10-23 RX ORDER — ATORVASTATIN CALCIUM 20 MG/1
TABLET, FILM COATED ORAL
Qty: 30 TABLET | Refills: 2 | Status: SHIPPED | OUTPATIENT
Start: 2024-10-23

## 2024-10-23 RX ORDER — HYDROCHLOROTHIAZIDE 12.5 MG/1
TABLET ORAL
Qty: 90 TABLET | Refills: 3 | Status: SHIPPED | OUTPATIENT
Start: 2024-10-23

## 2024-10-29 ENCOUNTER — OFFICE VISIT (OUTPATIENT)
Dept: INTERNAL MEDICINE | Facility: CLINIC | Age: 69
End: 2024-10-29
Payer: COMMERCIAL

## 2024-10-29 VITALS
HEART RATE: 66 BPM | WEIGHT: 130 LBS | DIASTOLIC BLOOD PRESSURE: 69 MMHG | BODY MASS INDEX: 25.52 KG/M2 | HEIGHT: 60 IN | SYSTOLIC BLOOD PRESSURE: 127 MMHG | TEMPERATURE: 98.1 F | OXYGEN SATURATION: 97 % | RESPIRATION RATE: 12 BRPM

## 2024-10-29 DIAGNOSIS — E11.9 TYPE 2 DIABETES MELLITUS WITHOUT COMPLICATION, WITHOUT LONG-TERM CURRENT USE OF INSULIN (H): Primary | ICD-10-CM

## 2024-10-29 DIAGNOSIS — R73.03 PRE-DIABETES: ICD-10-CM

## 2024-10-29 DIAGNOSIS — H90.3 BILATERAL SENSORINEURAL HEARING LOSS: ICD-10-CM

## 2024-10-29 LAB
EST. AVERAGE GLUCOSE BLD GHB EST-MCNC: 134 MG/DL
HBA1C MFR BLD: 6.3 % (ref 0–5.6)

## 2024-10-29 PROCEDURE — 99213 OFFICE O/P EST LOW 20 MIN: CPT | Performed by: INTERNAL MEDICINE

## 2024-10-29 PROCEDURE — 82043 UR ALBUMIN QUANTITATIVE: CPT | Performed by: INTERNAL MEDICINE

## 2024-10-29 PROCEDURE — 80053 COMPREHEN METABOLIC PANEL: CPT | Performed by: INTERNAL MEDICINE

## 2024-10-29 PROCEDURE — 83036 HEMOGLOBIN GLYCOSYLATED A1C: CPT | Performed by: INTERNAL MEDICINE

## 2024-10-29 PROCEDURE — 36415 COLL VENOUS BLD VENIPUNCTURE: CPT | Performed by: INTERNAL MEDICINE

## 2024-10-29 PROCEDURE — 82570 ASSAY OF URINE CREATININE: CPT | Performed by: INTERNAL MEDICINE

## 2024-10-29 ASSESSMENT — PATIENT HEALTH QUESTIONNAIRE - PHQ9
SUM OF ALL RESPONSES TO PHQ QUESTIONS 1-9: 0
10. IF YOU CHECKED OFF ANY PROBLEMS, HOW DIFFICULT HAVE THESE PROBLEMS MADE IT FOR YOU TO DO YOUR WORK, TAKE CARE OF THINGS AT HOME, OR GET ALONG WITH OTHER PEOPLE: NOT DIFFICULT AT ALL
SUM OF ALL RESPONSES TO PHQ QUESTIONS 1-9: 0

## 2024-10-29 ASSESSMENT — PAIN SCALES - GENERAL: PAINLEVEL_OUTOF10: NO PAIN (0)

## 2024-10-29 NOTE — PROGRESS NOTES
"  Assessment & Plan     Type 2 diabetes mellitus without complication, without long-term current use of insulin (H)  Lab Results   Component Value Date    A1C 6.3 10/29/2024    A1C 6.3 06/26/2024    A1C 6.1 04/01/2024    A1C 6.5 01/19/2024     I did reassure her that her diet and lifestyle changes have caused major improvement in her diabetic test and she is now out of the diabetic range  - Albumin Random Urine Quantitative with Creat Ratio; Future  - Comprehensive metabolic panel; Future  - Albumin Random Urine Quantitative with Creat Ratio  - Comprehensive metabolic panel    Pre-diabetes    - HEMOGLOBIN A1C; Future  - HEMOGLOBIN A1C    Bilateral sensorineural hearing loss  She is thinking about a left cochlear implant.  Congratulations on her improvement.  Reviewed her health maintenance she declines vaccinations at this point.          BMI  Estimated body mass index is 25.82 kg/m  as calculated from the following:    Height as of this encounter: 1.511 m (4' 11.5\").    Weight as of this encounter: 59 kg (130 lb).             Helen Banuelos is a 69 year old, presenting for the following health issues:  Right cochlear implant some succcess but is thinking about the other ear   Vertigo has reolved   Lab Results   Component Value Date    A1C 6.3 06/26/2024    A1C 6.1 04/01/2024    A1C 6.5 01/19/2024       RECHECK (Patient reports they are here to recheck blood sugar levels.)      10/29/2024    11:04 AM   Additional Questions   Roomed by Gail   Accompanied by alone         10/29/2024    11:04 AM   Patient Reported Additional Medications   Patient reports taking the following new medications none     History of Present Illness       Diabetes:   She presents for follow up of diabetes.    She is not checking blood glucose.         She has no concerns regarding her diabetes at this time.   She is not experiencing numbness or burning in feet, excessive thirst, blurry vision, weight changes or redness, sores or blisters " "on feet. The patient has not had a diabetic eye exam in the last 12 months.          She eats 0-1 servings of fruits and vegetables daily.She consumes 0 sweetened beverage(s) daily.She exercises with enough effort to increase her heart rate 30 to 60 minutes per day.  She exercises with enough effort to increase her heart rate 6 days per week.   She is taking medications regularly.                     Objective    /69 (BP Location: Left arm, Patient Position: Sitting, Cuff Size: Adult Regular)   Pulse 66   Temp 98.1  F (36.7  C) (Tympanic)   Resp 12   Ht 1.511 m (4' 11.5\")   Wt 59 kg (130 lb)   LMP  (LMP Unknown)   SpO2 97%   Breastfeeding No   BMI 25.82 kg/m    Body mass index is 25.82 kg/m .  Physical Exam   GENERAL: alert and no distress  NECK: no adenopathy, no asymmetry, masses, or scars  RESP: lungs clear to auscultation - no rales, rhonchi or wheezes  CV: regular rate and rhythm, normal S1 S2, no S3 or S4, no murmur, click or rub, no peripheral edema  MS: no gross musculoskeletal defects noted, no edema            Signed Electronically by: Stephanie Allen MD    "

## 2024-10-30 LAB
ALBUMIN SERPL BCG-MCNC: 4.5 G/DL (ref 3.5–5.2)
ALP SERPL-CCNC: 58 U/L (ref 40–150)
ALT SERPL W P-5'-P-CCNC: 29 U/L (ref 0–50)
ANION GAP SERPL CALCULATED.3IONS-SCNC: 11 MMOL/L (ref 7–15)
AST SERPL W P-5'-P-CCNC: 36 U/L (ref 0–45)
BILIRUB SERPL-MCNC: 0.5 MG/DL
BUN SERPL-MCNC: 10.1 MG/DL (ref 8–23)
CALCIUM SERPL-MCNC: 9.7 MG/DL (ref 8.8–10.4)
CHLORIDE SERPL-SCNC: 99 MMOL/L (ref 98–107)
CREAT SERPL-MCNC: 0.69 MG/DL (ref 0.51–0.95)
CREAT UR-MCNC: 43.4 MG/DL
EGFRCR SERPLBLD CKD-EPI 2021: >90 ML/MIN/1.73M2
GLUCOSE SERPL-MCNC: 118 MG/DL (ref 70–99)
HCO3 SERPL-SCNC: 29 MMOL/L (ref 22–29)
MICROALBUMIN UR-MCNC: 12 MG/L
MICROALBUMIN/CREAT UR: 27.65 MG/G CR (ref 0–25)
POTASSIUM SERPL-SCNC: 4.5 MMOL/L (ref 3.4–5.3)
PROT SERPL-MCNC: 7 G/DL (ref 6.4–8.3)
SODIUM SERPL-SCNC: 139 MMOL/L (ref 135–145)

## 2024-12-02 DIAGNOSIS — Z76.0 ENCOUNTER FOR MEDICATION REFILL: ICD-10-CM

## 2024-12-02 DIAGNOSIS — I10 ESSENTIAL HYPERTENSION, BENIGN: ICD-10-CM

## 2024-12-03 RX ORDER — LISINOPRIL 10 MG/1
TABLET ORAL
Qty: 180 TABLET | Refills: 2 | Status: SHIPPED | OUTPATIENT
Start: 2024-12-03

## 2025-02-15 ENCOUNTER — HEALTH MAINTENANCE LETTER (OUTPATIENT)
Age: 70
End: 2025-02-15

## 2025-02-17 ENCOUNTER — OFFICE VISIT (OUTPATIENT)
Dept: INTERNAL MEDICINE | Facility: CLINIC | Age: 70
End: 2025-02-17
Attending: INTERNAL MEDICINE
Payer: COMMERCIAL

## 2025-02-17 VITALS
TEMPERATURE: 97.6 F | HEIGHT: 60 IN | WEIGHT: 135 LBS | OXYGEN SATURATION: 96 % | DIASTOLIC BLOOD PRESSURE: 71 MMHG | BODY MASS INDEX: 26.5 KG/M2 | HEART RATE: 64 BPM | RESPIRATION RATE: 16 BRPM | SYSTOLIC BLOOD PRESSURE: 127 MMHG

## 2025-02-17 DIAGNOSIS — E78.2 MIXED HYPERLIPIDEMIA: ICD-10-CM

## 2025-02-17 DIAGNOSIS — Z01.818 PREOP EXAM FOR INTERNAL MEDICINE: ICD-10-CM

## 2025-02-17 DIAGNOSIS — E11.9 TYPE 2 DIABETES MELLITUS WITHOUT COMPLICATION, WITHOUT LONG-TERM CURRENT USE OF INSULIN (H): Primary | ICD-10-CM

## 2025-02-17 DIAGNOSIS — Z23 NEED FOR TDAP VACCINATION: ICD-10-CM

## 2025-02-17 DIAGNOSIS — Z23 NEED FOR SHINGLES VACCINE: ICD-10-CM

## 2025-02-17 DIAGNOSIS — R79.9 ABNORMAL BLOOD CHEMISTRY: ICD-10-CM

## 2025-02-17 DIAGNOSIS — E78.5 HYPERLIPIDEMIA, UNSPECIFIED HYPERLIPIDEMIA TYPE: ICD-10-CM

## 2025-02-17 DIAGNOSIS — H91.90 HEARING LOSS, UNSPECIFIED HEARING LOSS TYPE, UNSPECIFIED LATERALITY: ICD-10-CM

## 2025-02-17 DIAGNOSIS — R73.03 PRE-DIABETES: ICD-10-CM

## 2025-02-17 DIAGNOSIS — E55.9 VITAMIN D DEFICIENCY: ICD-10-CM

## 2025-02-17 LAB
ALBUMIN SERPL BCG-MCNC: 4.6 G/DL (ref 3.5–5.2)
ALP SERPL-CCNC: 47 U/L (ref 40–150)
ALT SERPL W P-5'-P-CCNC: 26 U/L (ref 0–50)
ANION GAP SERPL CALCULATED.3IONS-SCNC: 15 MMOL/L (ref 7–15)
AST SERPL W P-5'-P-CCNC: 30 U/L (ref 0–45)
BASOPHILS # BLD AUTO: 0 10E3/UL (ref 0–0.2)
BASOPHILS NFR BLD AUTO: 0 %
BILIRUB SERPL-MCNC: 0.3 MG/DL
BUN SERPL-MCNC: 10.4 MG/DL (ref 8–23)
CALCIUM SERPL-MCNC: 10.2 MG/DL (ref 8.8–10.4)
CHLORIDE SERPL-SCNC: 99 MMOL/L (ref 98–107)
CHOLEST SERPL-MCNC: 233 MG/DL
CREAT SERPL-MCNC: 0.66 MG/DL (ref 0.51–0.95)
EGFRCR SERPLBLD CKD-EPI 2021: >90 ML/MIN/1.73M2
EOSINOPHIL # BLD AUTO: 0.2 10E3/UL (ref 0–0.7)
EOSINOPHIL NFR BLD AUTO: 3 %
ERYTHROCYTE [DISTWIDTH] IN BLOOD BY AUTOMATED COUNT: 12.2 % (ref 10–15)
EST. AVERAGE GLUCOSE BLD GHB EST-MCNC: 126 MG/DL
FASTING STATUS PATIENT QL REPORTED: NO
FASTING STATUS PATIENT QL REPORTED: NO
GLUCOSE SERPL-MCNC: 112 MG/DL (ref 70–99)
HBA1C MFR BLD: 6 % (ref 0–5.6)
HCO3 SERPL-SCNC: 26 MMOL/L (ref 22–29)
HCT VFR BLD AUTO: 40.4 % (ref 35–47)
HDLC SERPL-MCNC: 83 MG/DL
HGB BLD-MCNC: 13.7 G/DL (ref 11.7–15.7)
IMM GRANULOCYTES # BLD: 0 10E3/UL
IMM GRANULOCYTES NFR BLD: 0 %
LDLC SERPL CALC-MCNC: 105 MG/DL
LYMPHOCYTES # BLD AUTO: 2.7 10E3/UL (ref 0.8–5.3)
LYMPHOCYTES NFR BLD AUTO: 43 %
MCH RBC QN AUTO: 29.1 PG (ref 26.5–33)
MCHC RBC AUTO-ENTMCNC: 33.9 G/DL (ref 31.5–36.5)
MCV RBC AUTO: 86 FL (ref 78–100)
MONOCYTES # BLD AUTO: 0.4 10E3/UL (ref 0–1.3)
MONOCYTES NFR BLD AUTO: 6 %
NEUTROPHILS # BLD AUTO: 3 10E3/UL (ref 1.6–8.3)
NEUTROPHILS NFR BLD AUTO: 48 %
NONHDLC SERPL-MCNC: 150 MG/DL
PLATELET # BLD AUTO: 326 10E3/UL (ref 150–450)
POTASSIUM SERPL-SCNC: 4.4 MMOL/L (ref 3.4–5.3)
PROT SERPL-MCNC: 7.1 G/DL (ref 6.4–8.3)
RBC # BLD AUTO: 4.71 10E6/UL (ref 3.8–5.2)
SODIUM SERPL-SCNC: 140 MMOL/L (ref 135–145)
TRIGL SERPL-MCNC: 225 MG/DL
TSH SERPL DL<=0.005 MIU/L-ACNC: 1.72 UIU/ML (ref 0.3–4.2)
VIT D+METAB SERPL-MCNC: 53 NG/ML (ref 20–50)
WBC # BLD AUTO: 6.3 10E3/UL (ref 4–11)

## 2025-02-17 PROCEDURE — 80061 LIPID PANEL: CPT | Performed by: INTERNAL MEDICINE

## 2025-02-17 PROCEDURE — 84443 ASSAY THYROID STIM HORMONE: CPT | Performed by: INTERNAL MEDICINE

## 2025-02-17 PROCEDURE — 83036 HEMOGLOBIN GLYCOSYLATED A1C: CPT | Performed by: INTERNAL MEDICINE

## 2025-02-17 PROCEDURE — 85025 COMPLETE CBC W/AUTO DIFF WBC: CPT | Performed by: INTERNAL MEDICINE

## 2025-02-17 PROCEDURE — G2211 COMPLEX E/M VISIT ADD ON: HCPCS | Performed by: INTERNAL MEDICINE

## 2025-02-17 PROCEDURE — 90662 IIV NO PRSV INCREASED AG IM: CPT | Performed by: INTERNAL MEDICINE

## 2025-02-17 PROCEDURE — 82306 VITAMIN D 25 HYDROXY: CPT | Performed by: INTERNAL MEDICINE

## 2025-02-17 PROCEDURE — G0008 ADMIN INFLUENZA VIRUS VAC: HCPCS | Performed by: INTERNAL MEDICINE

## 2025-02-17 PROCEDURE — 99214 OFFICE O/P EST MOD 30 MIN: CPT | Performed by: INTERNAL MEDICINE

## 2025-02-17 PROCEDURE — 36415 COLL VENOUS BLD VENIPUNCTURE: CPT | Performed by: INTERNAL MEDICINE

## 2025-02-17 PROCEDURE — 80053 COMPREHEN METABOLIC PANEL: CPT | Performed by: INTERNAL MEDICINE

## 2025-02-17 SDOH — HEALTH STABILITY: PHYSICAL HEALTH: ON AVERAGE, HOW MANY MINUTES DO YOU ENGAGE IN EXERCISE AT THIS LEVEL?: 60 MIN

## 2025-02-17 SDOH — HEALTH STABILITY: PHYSICAL HEALTH: ON AVERAGE, HOW MANY DAYS PER WEEK DO YOU ENGAGE IN MODERATE TO STRENUOUS EXERCISE (LIKE A BRISK WALK)?: 6 DAYS

## 2025-02-17 ASSESSMENT — PATIENT HEALTH QUESTIONNAIRE - PHQ9
10. IF YOU CHECKED OFF ANY PROBLEMS, HOW DIFFICULT HAVE THESE PROBLEMS MADE IT FOR YOU TO DO YOUR WORK, TAKE CARE OF THINGS AT HOME, OR GET ALONG WITH OTHER PEOPLE: NOT DIFFICULT AT ALL
SUM OF ALL RESPONSES TO PHQ QUESTIONS 1-9: 1
SUM OF ALL RESPONSES TO PHQ QUESTIONS 1-9: 1

## 2025-02-17 ASSESSMENT — SOCIAL DETERMINANTS OF HEALTH (SDOH): HOW OFTEN DO YOU GET TOGETHER WITH FRIENDS OR RELATIVES?: ONCE A WEEK

## 2025-02-17 ASSESSMENT — PAIN SCALES - GENERAL: PAINLEVEL_OUTOF10: NO PAIN (0)

## 2025-02-17 NOTE — PROGRESS NOTES
Pt tolerated injections. Site was cleansed with alcohol prior to injections. No pain, burning, swelling or redness at the site of the injection. Patient instructed to remain in clinic for 15 minutes afterwards, and to report any adverse reactions.

## 2025-02-17 NOTE — PROGRESS NOTES
Preoperative Evaluation  [facility]  [address] [address]  Phone: [phone]  Fax: [phone]  Primary Provider: Stephanie Allen MD  Pre-op Performing Provider: Stephanie Allen MD  Feb 17, 2025 2/17/2025   Surgical Information   What procedure is being done? LEFT EAR IMPLANT   Facility or Hospital where procedure/surgery will be performed: [facility]   Who is doing the procedure / surgery? DR QURSEHI   Date of surgery / procedure: 3/5/25   Time of surgery / procedure: TBD   Where do you plan to recover after surgery? at home alone     Fax number for surgical facility:     Assessment & Plan     The proposed surgical procedure is considered LOW risk.    Need for shingles vaccine      Need for Tdap vaccination      Type 2 diabetes mellitus without complication, without long-term current use of insulin (H)  Lab Results   Component Value Date    A1C 6.0 02/17/2025    A1C 6.3 10/29/2024    A1C 6.3 06/26/2024    A1C 6.1 04/01/2024    A1C 6.5 01/19/2024     Excellent control   On diet alone.  BP controlled,on statin ,  up to date on eye checks and microalbumin .     Hyperlipidemia, unspecified hyperlipidemia type  On statin    Pre-diabetes    - Hemoglobin A1c; Future  - Lipid panel reflex to direct LDL Fasting; Future  - CBC with Platelets & Differential; Future  - Hemoglobin A1c  - Lipid panel reflex to direct LDL Fasting    Hearing loss, unspecified hearing loss type, unspecified laterality  Is sensorineural hearing loss bilateral is getting her second cochlear implant    Mixed hyperlipidemia    - TSH; Future  - Comprehensive metabolic panel; Future  - TSH  - Comprehensive metabolic panel    Abnormal blood chemistry      Vitamin D deficiency    - Vitamin D Deficiency; Future  - Vitamin D Deficiency    Preop exam for internal medicine    - CBC with platelets and differential            - No identified additional risk factors  other than previously addressed         Recommendation  Approval given to proceed with proposed procedure, without further diagnostic evaluation.    Helen Banuelos is a 69 year old, presenting for the following:  Pre-Op Exam and Recheck Medication (PT REPORTS THAT SHE'S HERE TO COMPLETE PRE-OP EXAM FOR LEFT EAR COCHLER IMPLANT ON 3/5/25 AT [facility] WITH DR QURESHI)          2/17/2025    12:09 PM   Additional Questions   Roomed by REED   Accompanied by ALONE         2/17/2025    12:09 PM   Patient Reported Additional Medications   Patient reports taking the following new medications NONE     HPI related to upcoming procedure: Bilateral sensorineural hearing loss requiring cochlear implants        2/17/2025   Pre-Op Questionnaire   Have you ever had a heart attack or stroke? No   Have you ever had surgery on your heart or blood vessels, such as a stent placement, a coronary artery bypass, or surgery on an artery in your head, neck, heart, or legs? No   Do you have chest pain with activity? No   Do you have a history of heart failure? No   Do you currently have a cold, bronchitis or symptoms of other infection? No   Do you have a cough, shortness of breath, or wheezing? No   Do you or anyone in your family have previous history of blood clots? No   Do you or does anyone in your family have a serious bleeding problem such as prolonged bleeding following surgeries or cuts? No   Have you ever had problems with anemia or been told to take iron pills? No   Have you had any abnormal blood loss such as black, tarry or bloody stools, or abnormal vaginal bleeding? No   Have you ever had a blood transfusion? No   Are you willing to have a blood transfusion if it is medically needed before, during, or after your surgery? Yes   Have you or any of your relatives ever had problems with anesthesia? No   Do you have sleep apnea, excessive snoring or daytime drowsiness? No   Do you have any artifical heart valves or other  implanted medical devices like a pacemaker, defibrillator, or continuous glucose monitor? No   Do you have artificial joints? No   Are you allergic to latex? No     Health Care Directive  Patient does not have a Health Care Directive: Discussed advance care planning with patient; however, patient declined at this time.    Preoperative Review of             Patient Active Problem List    Diagnosis Date Noted    Metabolic dysfunction-associated steatohepatitis (MASH) 06/26/2024     Priority: Medium    Benign paroxysmal positional vertigo due to bilateral vestibular disorder 05/03/2024     Priority: Medium    Meniere's disease, bilateral 05/03/2024     Priority: Medium    Pre-diabetes 02/20/2024     Priority: Medium    Urgency of urination 02/20/2024     Priority: Medium    Urinary frequency 02/20/2024     Priority: Medium    Mixed incontinence 02/20/2024     Priority: Medium    Health maintenance examination 09/03/2020     Priority: Medium     Colon 2020 clear next in ten years .  dexa 2021 normal   Immunization shingrix due   Pap ( has had a hysterectomy )  mammo annual         Anxiety 02/08/2019     Priority: Medium    Benign familial tremor      Priority: Medium     Created by Conversion        Current moderate episode of major depressive disorder without prior episode (H)      Priority: Medium     Created by Conversion        Hyperlipidemia      Priority: Medium     Created by Conversion        Bilateral sensorineural hearing loss 05/11/2017     Priority: Medium    Osteoarthritis Of The Knee      Priority: Medium     Created by Conversion  Replacement Utility updated for latest IMO load    Replacing diagnoses that were inactivated after the 10/1/2021 regulatory import.      Benign Essential Hypertension      Priority: Medium     Created by Conversion          Past Medical History:   Diagnosis Date    Anxiety     Anxiety and depression     Arthritis     Benign familial tremor     Bilateral sensorineural hearing  loss 2017    Bilateral sensorineural hearing loss     Cervical Radiculopathy     Created by Conversion  Replacement Utility updated for latest IMO load         Gastritis 2015    HLD (hyperlipidemia)     Hypertension     Left cervical radiculopathy 10/22/2012    Left cervical radiculopathy 10/22/2012    Meniere's disease, bilateral     Spigelian hernia     Vertigo      Past Surgical History:   Procedure Laterality Date    ABDOMINOPLASTY  2012    BIOPSY BREAST Right 2010    benign    BIOPSY BREAST Right      SECTION  1974    and one in     HERNIORRHAPHY, INCISIONAL, ROBOT-ASSISTED, LAPAROSCOPIC, USING DA FROY XI Left 2024    Procedure: HERNIORRHAPHY LEFT SPIGELIAN HERNIA WITH MESH ROBOT-ASSISTED, LAPAROSCOPIC, USING DA FROY XI;  Surgeon: Sky Guevara DO;  Location: [facility] OR    HYSTERECTOMY      IR CERVICAL EPIDURAL STEROID INJECTION  2012    IR CERVICAL EPIDURAL STEROID INJECTION  10/02/2012    MAMMOPLASTY REDUCTION  2012    OOPHORECTOMY      IN EXCIS THYROID DUCT CYST/SINUS      Description: Thyroglossal Duct Cyst Excision    IN OPEN RX DISTAL RADIUS FX, EXTRA-ARTICULAR      Description: Open Treatment Of Fracture Of Distal Radius     Current Outpatient Medications   Medication Sig Dispense Refill    ALPRAZolam (XANAX) 0.25 MG tablet TAKE 1/2 TABLET BY MOUTH EVERY DAY AS NEEDED 30 tablet 1    atorvastatin (LIPITOR) 20 MG tablet TAKE 1 TABLET BY MOUTH EVERY DAY 30 tablet 2    cholecalciferol (VITAMIN D3) 25 mcg (1000 units) capsule Take 1 capsule by mouth daily      hydroCHLOROthiazide 12.5 MG tablet Take 1 tablet (12.5 mg total) by mouth daily. 90 tablet 3    lisinopril (ZESTRIL) 10 MG tablet Take 2 tablets (20 mg total) by mouth daily. 180 tablet 2    omeprazole (PRILOSEC) 40 MG capsule [OMEPRAZOLE (PRILOSEC) 40 MG CAPSULE] Take 40 mg by mouth 2 (two) times a day before meals.      primidone (MYSOLINE) 50 MG tablet Take 1 tablet (50 mg) by mouth At Bedtime 90  "tablet 3       No Known Allergies     Social History     Tobacco Use    Smoking status: Never    Smokeless tobacco: Never   Substance Use Topics    Alcohol use: Yes       History   Drug Use Unknown           Current Outpatient Medications   Medication Sig Dispense Refill    ALPRAZolam (XANAX) 0.25 MG tablet TAKE 1/2 TABLET BY MOUTH EVERY DAY AS NEEDED 30 tablet 1    atorvastatin (LIPITOR) 20 MG tablet TAKE 1 TABLET BY MOUTH EVERY DAY 30 tablet 2    cholecalciferol (VITAMIN D3) 25 mcg (1000 units) capsule Take 1 capsule by mouth daily      hydroCHLOROthiazide 12.5 MG tablet Take 1 tablet (12.5 mg total) by mouth daily. 90 tablet 3    lisinopril (ZESTRIL) 10 MG tablet Take 2 tablets (20 mg total) by mouth daily. 180 tablet 2    omeprazole (PRILOSEC) 40 MG capsule [OMEPRAZOLE (PRILOSEC) 40 MG CAPSULE] Take 40 mg by mouth 2 (two) times a day before meals.      primidone (MYSOLINE) 50 MG tablet Take 1 tablet (50 mg) by mouth At Bedtime 90 tablet 3     No current facility-administered medications for this visit.         Objective    /71 (BP Location: Left arm, Patient Position: Sitting, Cuff Size: Adult Regular)   Pulse 64   Temp 97.6  F (36.4  C) (Tympanic)   Resp 16   Ht 1.511 m (4' 11.5\")   Wt 61.2 kg (135 lb)   LMP  (Approximate)   SpO2 96%   Breastfeeding No   BMI 26.81 kg/m     Estimated body mass index is 26.81 kg/m  as calculated from the following:    Height as of this encounter: 1.511 m (4' 11.5\").    Weight as of this encounter: 61.2 kg (135 lb).  Physical Exam  GENERAL: alert and no distress  NECK: no adenopathy, no asymmetry, masses, or scars  RESP: lungs clear to auscultation - no rales, rhonchi or wheezes  CV: regular rate and rhythm, normal S1 S2, no S3 or S4, no murmur, click or rub, no peripheral edema  ABDOMEN: soft, nontender, no hepatosplenomegaly, no masses and bowel sounds normal  MS: no gross musculoskeletal defects noted, no edema    Recent Labs   Lab Test 10/29/24  1159 " 06/26/24  1355 05/03/24  1043   HGB  --  13.0 13.5   PLT  --  332  --     140  --    POTASSIUM 4.5 4.2  --    CR 0.69 0.75  --    A1C 6.3* 6.3*  --         Diagnostics  No results found for this or any previous visit (from the past 24 hours).   No EKG required, no history of coronary heart disease, significant arrhythmia, peripheral arterial disease or other structural heart disease.    Revised Cardiac Risk Index (RCRI)  The patient has the following serious cardiovascular risks for perioperative complications:   - No serious cardiac risks = 0 points     RCRI Interpretation: 0 points: Class I (very low risk - 0.4% complication rate)         Signed Electronically by: Stephanie Allen MD  A copy of this evaluation report is provided to the requesting physician.        Answers submitted by the patient for this visit:  Patient Health Questionnaire (Submitted on 2/17/2025)  If you checked off any problems, how difficult have these problems made it for you to do your work, take care of things at home, or get along with other people?: Not difficult at all  PHQ9 TOTAL SCORE: 1

## 2025-02-18 ENCOUNTER — TELEPHONE (OUTPATIENT)
Dept: INTERNAL MEDICINE | Facility: CLINIC | Age: 70
End: 2025-02-18
Payer: COMMERCIAL

## 2025-02-18 NOTE — TELEPHONE ENCOUNTER
February 18, 2025    Patient's pre-operative physical documentation and labs have been completed by Dr. Allen.  The pre-operative paperwork was faxed to Service, Evan Escalante MD  Surgery - Otolaryngology  NPI: [id number]  [address] [place] [address]     Phone: [phone]  Fax: [phone]     per instructions from the surgeon.    Pam J. Behr

## 2025-03-15 ENCOUNTER — HEALTH MAINTENANCE LETTER (OUTPATIENT)
Age: 70
End: 2025-03-15

## 2025-04-14 ENCOUNTER — TRANSFERRED RECORDS (OUTPATIENT)
Dept: HEALTH INFORMATION MANAGEMENT | Facility: CLINIC | Age: 70
End: 2025-04-14
Payer: COMMERCIAL

## 2025-04-16 DIAGNOSIS — E78.2 MIXED HYPERLIPIDEMIA: ICD-10-CM

## 2025-04-17 RX ORDER — ATORVASTATIN CALCIUM 20 MG/1
20 TABLET, FILM COATED ORAL
Qty: 90 TABLET | Refills: 0 | Status: SHIPPED | OUTPATIENT
Start: 2025-04-17

## 2025-06-07 ENCOUNTER — HEALTH MAINTENANCE LETTER (OUTPATIENT)
Age: 70
End: 2025-06-07

## 2025-07-09 ENCOUNTER — ANCILLARY PROCEDURE (OUTPATIENT)
Dept: MAMMOGRAPHY | Facility: CLINIC | Age: 70
End: 2025-07-09
Payer: COMMERCIAL

## 2025-07-09 DIAGNOSIS — Z12.31 VISIT FOR SCREENING MAMMOGRAM: ICD-10-CM

## 2025-07-09 PROCEDURE — 77067 SCR MAMMO BI INCL CAD: CPT | Mod: TC | Performed by: RADIOLOGY

## 2025-07-09 PROCEDURE — 77063 BREAST TOMOSYNTHESIS BI: CPT | Mod: TC | Performed by: RADIOLOGY

## 2025-07-10 DIAGNOSIS — F41.9 ANXIETY: ICD-10-CM

## 2025-07-10 RX ORDER — ALPRAZOLAM 0.25 MG
TABLET ORAL
Qty: 30 TABLET | Refills: 1 | Status: SHIPPED | OUTPATIENT
Start: 2025-07-10

## 2025-07-26 DIAGNOSIS — E78.2 MIXED HYPERLIPIDEMIA: ICD-10-CM

## 2025-07-28 RX ORDER — ATORVASTATIN CALCIUM 20 MG/1
20 TABLET, FILM COATED ORAL DAILY
Qty: 90 TABLET | Refills: 1 | Status: SHIPPED | OUTPATIENT
Start: 2025-07-28

## 2025-07-30 ENCOUNTER — OFFICE VISIT (OUTPATIENT)
Dept: INTERNAL MEDICINE | Facility: CLINIC | Age: 70
End: 2025-07-30
Payer: COMMERCIAL

## 2025-07-30 VITALS
HEART RATE: 81 BPM | SYSTOLIC BLOOD PRESSURE: 124 MMHG | WEIGHT: 130.8 LBS | RESPIRATION RATE: 12 BRPM | HEIGHT: 59 IN | DIASTOLIC BLOOD PRESSURE: 68 MMHG | TEMPERATURE: 97.7 F | BODY MASS INDEX: 26.37 KG/M2 | OXYGEN SATURATION: 98 %

## 2025-07-30 DIAGNOSIS — Z00.00 HEALTH MAINTENANCE EXAMINATION: ICD-10-CM

## 2025-07-30 DIAGNOSIS — E78.2 MIXED HYPERLIPIDEMIA: ICD-10-CM

## 2025-07-30 DIAGNOSIS — I10 ESSENTIAL HYPERTENSION, BENIGN: ICD-10-CM

## 2025-07-30 DIAGNOSIS — M19.041 PRIMARY OSTEOARTHRITIS OF BOTH HANDS: ICD-10-CM

## 2025-07-30 DIAGNOSIS — K75.81 METABOLIC DYSFUNCTION-ASSOCIATED STEATOHEPATITIS (MASH): ICD-10-CM

## 2025-07-30 DIAGNOSIS — Z96.21 COCHLEAR IMPLANT IN PLACE: ICD-10-CM

## 2025-07-30 DIAGNOSIS — K25.9 GASTRIC EROSIONS, UNSPECIFIED ULCER CHRONICITY: ICD-10-CM

## 2025-07-30 DIAGNOSIS — F32.1 CURRENT MODERATE EPISODE OF MAJOR DEPRESSIVE DISORDER WITHOUT PRIOR EPISODE (H): ICD-10-CM

## 2025-07-30 DIAGNOSIS — Z23 NEED FOR VACCINATION: ICD-10-CM

## 2025-07-30 DIAGNOSIS — G25.0 ESSENTIAL TREMOR: ICD-10-CM

## 2025-07-30 DIAGNOSIS — M19.042 PRIMARY OSTEOARTHRITIS OF BOTH HANDS: ICD-10-CM

## 2025-07-30 DIAGNOSIS — H90.3 BILATERAL SENSORINEURAL HEARING LOSS: ICD-10-CM

## 2025-07-30 DIAGNOSIS — M81.0 OSTEOPOROSIS, UNSPECIFIED OSTEOPOROSIS TYPE, UNSPECIFIED PATHOLOGICAL FRACTURE PRESENCE: ICD-10-CM

## 2025-07-30 DIAGNOSIS — Z09 S/P SPIGELIAN HERNIA REPAIR, FOLLOW-UP EXAM: ICD-10-CM

## 2025-07-30 DIAGNOSIS — Z76.0 ENCOUNTER FOR MEDICATION REFILL: ICD-10-CM

## 2025-07-30 DIAGNOSIS — R79.9 ABNORMAL BLOOD CHEMISTRY: ICD-10-CM

## 2025-07-30 DIAGNOSIS — R73.03 PRE-DIABETES: Primary | ICD-10-CM

## 2025-07-30 PROBLEM — R39.15 URGENCY OF URINATION: Status: RESOLVED | Noted: 2024-02-20 | Resolved: 2025-07-30

## 2025-07-30 PROBLEM — R35.0 URINARY FREQUENCY: Status: RESOLVED | Noted: 2024-02-20 | Resolved: 2025-07-30

## 2025-07-30 PROBLEM — N39.46 MIXED INCONTINENCE: Status: RESOLVED | Noted: 2024-02-20 | Resolved: 2025-07-30

## 2025-07-30 LAB
EST. AVERAGE GLUCOSE BLD GHB EST-MCNC: 126 MG/DL
HBA1C MFR BLD: 6 % (ref 0–5.6)

## 2025-07-30 PROCEDURE — 3074F SYST BP LT 130 MM HG: CPT | Performed by: INTERNAL MEDICINE

## 2025-07-30 PROCEDURE — 80053 COMPREHEN METABOLIC PANEL: CPT | Performed by: INTERNAL MEDICINE

## 2025-07-30 PROCEDURE — 3078F DIAST BP <80 MM HG: CPT | Performed by: INTERNAL MEDICINE

## 2025-07-30 PROCEDURE — 80061 LIPID PANEL: CPT | Performed by: INTERNAL MEDICINE

## 2025-07-30 PROCEDURE — 1126F AMNT PAIN NOTED NONE PRSNT: CPT | Performed by: INTERNAL MEDICINE

## 2025-07-30 PROCEDURE — 3044F HG A1C LEVEL LT 7.0%: CPT | Performed by: INTERNAL MEDICINE

## 2025-07-30 PROCEDURE — 83036 HEMOGLOBIN GLYCOSYLATED A1C: CPT | Performed by: INTERNAL MEDICINE

## 2025-07-30 PROCEDURE — 36415 COLL VENOUS BLD VENIPUNCTURE: CPT | Performed by: INTERNAL MEDICINE

## 2025-07-30 PROCEDURE — 99214 OFFICE O/P EST MOD 30 MIN: CPT | Mod: 25 | Performed by: INTERNAL MEDICINE

## 2025-07-30 PROCEDURE — 84550 ASSAY OF BLOOD/URIC ACID: CPT | Performed by: INTERNAL MEDICINE

## 2025-07-30 PROCEDURE — G0439 PPPS, SUBSEQ VISIT: HCPCS | Performed by: INTERNAL MEDICINE

## 2025-07-30 RX ORDER — LISINOPRIL 20 MG/1
20 TABLET ORAL DAILY
Qty: 90 TABLET | Refills: 3 | Status: SHIPPED | OUTPATIENT
Start: 2025-07-30

## 2025-07-30 RX ORDER — OMEPRAZOLE 40 MG/1
40 CAPSULE, DELAYED RELEASE ORAL
Qty: 180 CAPSULE | Refills: 3 | Status: SHIPPED | OUTPATIENT
Start: 2025-07-30

## 2025-07-30 RX ORDER — SUCRALFATE 1 G/1
1 TABLET ORAL 2 TIMES DAILY
Qty: 180 TABLET | Refills: 2 | Status: SHIPPED | OUTPATIENT
Start: 2025-07-30

## 2025-07-30 RX ORDER — LISINOPRIL 10 MG/1
TABLET ORAL
Qty: 180 TABLET | Refills: 2 | Status: CANCELLED | OUTPATIENT
Start: 2025-07-30

## 2025-07-30 SDOH — HEALTH STABILITY: PHYSICAL HEALTH: ON AVERAGE, HOW MANY DAYS PER WEEK DO YOU ENGAGE IN MODERATE TO STRENUOUS EXERCISE (LIKE A BRISK WALK)?: 6 DAYS

## 2025-07-30 ASSESSMENT — SOCIAL DETERMINANTS OF HEALTH (SDOH): HOW OFTEN DO YOU GET TOGETHER WITH FRIENDS OR RELATIVES?: TWICE A WEEK

## 2025-07-30 ASSESSMENT — PAIN SCALES - GENERAL: PAINLEVEL_OUTOF10: NO PAIN (0)

## 2025-07-30 ASSESSMENT — ANXIETY QUESTIONNAIRES
5. BEING SO RESTLESS THAT IT IS HARD TO SIT STILL: NOT AT ALL
IF YOU CHECKED OFF ANY PROBLEMS ON THIS QUESTIONNAIRE, HOW DIFFICULT HAVE THESE PROBLEMS MADE IT FOR YOU TO DO YOUR WORK, TAKE CARE OF THINGS AT HOME, OR GET ALONG WITH OTHER PEOPLE: NOT DIFFICULT AT ALL
7. FEELING AFRAID AS IF SOMETHING AWFUL MIGHT HAPPEN: NOT AT ALL
GAD7 TOTAL SCORE: 0
GAD7 TOTAL SCORE: 0
7. FEELING AFRAID AS IF SOMETHING AWFUL MIGHT HAPPEN: NOT AT ALL
8. IF YOU CHECKED OFF ANY PROBLEMS, HOW DIFFICULT HAVE THESE MADE IT FOR YOU TO DO YOUR WORK, TAKE CARE OF THINGS AT HOME, OR GET ALONG WITH OTHER PEOPLE?: NOT DIFFICULT AT ALL
3. WORRYING TOO MUCH ABOUT DIFFERENT THINGS: NOT AT ALL
6. BECOMING EASILY ANNOYED OR IRRITABLE: NOT AT ALL
2. NOT BEING ABLE TO STOP OR CONTROL WORRYING: NOT AT ALL
GAD7 TOTAL SCORE: 0
4. TROUBLE RELAXING: NOT AT ALL
1. FEELING NERVOUS, ANXIOUS, OR ON EDGE: NOT AT ALL

## 2025-07-30 ASSESSMENT — PATIENT HEALTH QUESTIONNAIRE - PHQ9
SUM OF ALL RESPONSES TO PHQ QUESTIONS 1-9: 0
SUM OF ALL RESPONSES TO PHQ QUESTIONS 1-9: 0

## 2025-07-30 NOTE — PROGRESS NOTES
Preventive Care Visit  [facility] [place]  Stephanie Allen MD, Internal Medicine  Jul 30, 2025      Assessment & Plan     Need for vaccination  Discussed   - zoster vaccine recombinant adjuvanted (SHINGRIX) injection; Inject 0.5 mLs into the muscle once for 1 dose. Pharmacist administered  - Tdap, tetanus-diptheria-acell pertussis, (BOOSTRIX) 5-2.5-18.5 LF-MCG/0.5 JAIME injection; Inject 0.5 mLs into the muscle once for 1 dose.    Pre-diabetes  Lab Results   Component Value Date    A1C 6.0 07/30/2025    A1C 6.0 02/17/2025    A1C 6.3 10/29/2024    A1C 6.3 06/26/2024    A1C 6.1 04/01/2024     Stable     Essential hypertension, benign  Well controlled   - lisinopril (ZESTRIL) 20 MG tablet; Take 1 tablet (20 mg) by mouth daily.    Encounter for medication refill      Bilateral sensorineural hearing loss  Bilateral cochlear implants with mixed results     Cochlear implant in place  Has received prevnar 20     Current moderate episode of major depressive disorder without prior episode (H)  Stable on no meds     Health maintenance examination  Colon 2020 clear next in ten years .  dexa 2021 normal   Immunization shingrix due   Pap ( has had a hysterectomy )  mammo annual     Mixed hyperlipidemia  On statin   LDL Cholesterol Calculated   Date Value Ref Range Status   02/17/2025 105 (H) <100 mg/dL Final   10/30/2018 76 <=129 mg/dL Final     Not at optimal levels will review present labs   - Comprehensive metabolic panel; Future  - Lipid panel reflex to direct LDL Fasting; Future  - Comprehensive metabolic panel  - Lipid panel reflex to direct LDL Fasting    Abnormal blood chemistry    - Hemoglobin A1c; Future  - Hemoglobin A1c    Gastric erosions, unspecified ulcer chronicity  Continues to take omeprazole 40mg twice daily   With breakthrough   Reviewed recent endoscopy . No hiatal hernia or esophagitis   But did have erosive gastropathy no identifiable risk factors like NSAIDS   Has had US abdomen and has no  "gallstones or bile duct disease   Recommend trial of carafate   - omeprazole (PRILOSEC) 40 MG DR capsule; Take 1 capsule (40 mg) by mouth 2 times daily (before meals).  - sucralfate (CARAFATE) 1 GM tablet; Take 1 tablet (1 g) by mouth 2 times daily.    Metabolic dysfunction-associated steatohepatitis (MASH)  Needs surveillance elastography counseled on abstaining from alcohol altogether   - US Elastography with Abdomen Limited; Future    S/P spigelian hernia repair, follow-up exam  Doing well     Primary osteoarthritis of both hands  No evidence clinically of RA   Or gout   - Uric acid; Future  - Uric acid    Essential tremor  Well controlled on primidone     Osteoporosis, unspecified osteoporosis type, unspecified pathological fracture presence  Surveillance due   - DX Bone Density; Future    no shortness of breath ,no  chest pain ,no  Falls, no urinary incontinence , no weight changes , sleep and moodhave been good . Independent in ADLS and IADLS   The longitudinal plan of care for the diagnosis(es)/condition(s) as documented were addressed during this visit. Due to the added complexity in care, I will continue to support Lakisha in the subsequent management and with ongoing continuity of care.    BMI  Estimated body mass index is 25.99 kg/m  as calculated from the following:    Height as of this encounter: 1.511 m (4' 11.49\").    Weight as of this encounter: 59.3 kg (130 lb 12.8 oz).       Counseling  Appropriate preventive services were addressed with this patient via screening, questionnaire, or discussion as appropriate for fall prevention, nutrition, physical activity, Tobacco-use cessation, social engagement, weight loss and cognition.  Checklist reviewing preventive services available has been given to the patient.  Reviewed patient's diet, addressing concerns and/or questions.   The patient was provided with written information regarding signs of hearing loss.   Reviewed preventive health counseling, as " reflected in patient instructions        Helen Banuelos is a 70 year old, presenting for the following:  Wellness Visit (Cochlear implant (bilateral) /Pt reports right one was placed last July 2024 and left side placed April 2025. /)  Meclizine       7/30/2025    12:27 PM   Additional Questions   Roomed by Susan CARLTON           Advance Care Planning    Discussed advance care planning with patient; informed AVS has link to [facility].        7/30/2025   General Health   How would you rate your overall physical health? Excellent   Feel stress (tense, anxious, or unable to sleep) Not at all         7/30/2025   Nutrition   Diet: Regular (no restrictions)         7/30/2025   Exercise   Days per week of moderate/strenous exercise 6 days         7/30/2025   Social Factors   Frequency of gathering with friends or relatives Twice a week   Worry food won't last until get money to buy more No   Food not last or not have enough money for food? No   Do you have housing? (Housing is defined as stable permanent housing and does not include staying outside in a car, in a tent, in an abandoned building, in an overnight shelter, or couch-surfing.) Yes   Are you worried about losing your housing? No   Lack of transportation? No   Unable to get utilities (heat,electricity)? No         7/30/2025   Fall Risk   Fallen 2 or more times in the past year? No   Trouble with walking or balance? No          7/30/2025   Activities of Daily Living- Home Safety   Needs help with the following daily activites None of the above   Safety concerns in the home None of the above         7/30/2025   Dental   Dentist two times every year? Yes         7/30/2025   Hearing Screening   Hearing concerns? (!) IT'S HARD TO FOLLOW A CONVERSATION IN A NOISY RESTAURANT OR CROWDED ROOM.    (!) TROUBLE UNDESTANDING A SPEAKER IN A PUBLIC MEETING OR [employer] SERVICE.   Would you like a referral for hearing testing? I already have hearing aids        Multiple values from one day are sorted in reverse-chronological order         7/30/2025   Driving Risk Screening   Patient/family members have concerns about driving No         7/30/2025   General Alertness/Fatigue Screening   Have you been more tired than usual lately? No         7/30/2025   Urinary Incontinence Screening   Bothered by leaking urine in past 6 months No       Today's PHQ-9 Score:       7/30/2025    12:12 PM   PHQ-9 SCORE   PHQ-9 Total Score MyChart 0   PHQ-9 Total Score 0        Patient-reported         7/30/2025   Substance Use   Alcohol more than 3/day or more than 7/wk No   Do you have a current opioid prescription? No   How severe/bad is pain from 1 to 10? 0/10 (No Pain)   Do you use any other substances recreationally? (!) XANAX OR OTHER BENZOS     Social History     Tobacco Use    Smoking status: Never    Smokeless tobacco: Never   Vaping Use    Vaping status: Never Used   Substance Use Topics    Alcohol use: Yes    Drug use: Never           7/9/2025   LAST FHS-7 RESULTS   1st degree relative breast or ovarian cancer No   Any relative bilateral breast cancer No   Any male have breast cancer No   Any ONE woman have BOTH breast AND ovarian cancer No   Any woman with breast cancer before 50yrs No   2 or more relatives with breast AND/OR ovarian cancer No   2 or more relatives with breast AND/OR bowel cancer No              History of abnormal Pap smear:        ASCVD Risk   The 10-year ASCVD risk score (Radha BOURGEOIS, et al., 2019) is: 18.8%    Values used to calculate the score:      Age: 70 years      Sex: Female      Is Non- : No      Diabetic: Yes      Tobacco smoker: No      Systolic Blood Pressure: 124 mmHg      Is BP treated: Yes      HDL Cholesterol: 83 mg/dL      Total Cholesterol: 233 mg/dL            Reviewed and updated as needed this visit by Provider                      Current providers sharing in care for this patient include:  Patient Care  Team:  Stephanie Allen MD as PCP - General (Internal Medicine)  Stephanie Allen MD as Assigned PCP  Stephanie Allen MD as Referring Physician (Internal Medicine)  Cassie Killian PA-C as Physician Assistant (Urology)  Eva Duran MD as Assigned OBGYN Provider  Sky Guevara DO as Assigned Surgical Provider    The following health maintenance items are reviewed in Epic and correct as of today:  Health Maintenance   Topic Date Due    DIABETIC FOOT EXAM  Never done    DEPRESSION ACTION PLAN  Never done    ZOSTER VACCINE (1 of 2) Never done    COVID-19 VACCINE (4 - 2024-25 season) 09/01/2024    DTAP/TDAP/TD VACCINE (2 - Td or Tdap) 01/08/2025    MEDICARE ANNUAL WELLNESS VISIT  01/19/2025    A1C  05/17/2025    INFLUENZA VACCINE (1) 09/01/2025    MICROALBUMIN  10/29/2025    PHQ-9  01/30/2026    BMP  02/17/2026    LIPID  02/17/2026    ANNUAL REVIEW OF HM ORDERS  02/17/2026    EYE EXAM  04/14/2026    FALL RISK ASSESSMENT  07/30/2026    MAMMO SCREENING  07/09/2027    COLORECTAL CANCER SCREENING  01/17/2030    ADVANCE CARE PLANNING  02/17/2030    RSV VACCINE (1 - 1-dose 75+ series) 05/13/2031    DEXA  09/22/2036    HEPATITIS C SCREENING  Completed    PNEUMOCOCCAL VACCINE 50+ YEARS  Completed    HPV VACCINE (No Doses Required) Completed    MENINGITIS VACCINE  Aged Out       Current Outpatient Medications   Medication Sig Dispense Refill    ALPRAZolam (XANAX) 0.25 MG tablet TAKE 1/2 TABLET BY MOUTH EVERY DAY AS NEEDED 30 tablet 1    atorvastatin (LIPITOR) 20 MG tablet TAKE 1 TABLET BY MOUTH EVERY DAY 90 tablet 1    cholecalciferol (VITAMIN D3) 25 mcg (1000 units) capsule Take 1 capsule by mouth daily      hydroCHLOROthiazide 12.5 MG tablet Take 1 tablet (12.5 mg total) by mouth daily. 90 tablet 3    lisinopril (ZESTRIL) 10 MG tablet Take 2 tablets (20 mg total) by mouth daily. 180 tablet 2    lisinopril (ZESTRIL) 20 MG tablet Take 1 tablet (20 mg) by mouth daily. 90 tablet 3    omeprazole (PRILOSEC) 40 MG DR capsule  "Take 1 capsule (40 mg) by mouth 2 times daily (before meals). 180 capsule 3    primidone (MYSOLINE) 50 MG tablet Take 1 tablet (50 mg) by mouth At Bedtime 90 tablet 3    sucralfate (CARAFATE) 1 GM tablet Take 1 tablet (1 g) by mouth 2 times daily. 180 tablet 2    Tdap, tetanus-diptheria-acell pertussis, (BOOSTRIX) 5-2.5-18.5 LF-MCG/0.5 JAIME injection Inject 0.5 mLs into the muscle once for 1 dose. 0.5 mL 0    zoster vaccine recombinant adjuvanted (SHINGRIX) injection Inject 0.5 mLs into the muscle once for 1 dose. Pharmacist administered 0.5 mL 0     No current facility-administered medications for this visit.          Objective    Exam  /68 (BP Location: Left arm, Patient Position: Sitting, Cuff Size: Adult Regular)   Pulse 81   Temp 97.7  F (36.5  C) (Temporal)   Resp 12   Ht 1.511 m (4' 11.49\")   Wt 59.3 kg (130 lb 12.8 oz)   LMP  (LMP Unknown)   SpO2 98%   BMI 25.99 kg/m     Estimated body mass index is 25.99 kg/m  as calculated from the following:    Height as of this encounter: 1.511 m (4' 11.49\").    Weight as of this encounter: 59.3 kg (130 lb 12.8 oz).    Physical Exam  GENERAL: alert and no distress  NECK: no adenopathy, no asymmetry, masses, or scars  RESP: lungs clear to auscultation - no rales, rhonchi or wheezes  CV: regular rate and rhythm, normal S1 S2, no S3 or S4, no murmur, click or rub, no peripheral edema  ABDOMEN: soft, nontender, no hepatosplenomegaly, no masses and bowel sounds normal  MS: no gross musculoskeletal defects noted, no edema         7/30/2025   Mini Cog   Clock Draw Score 2 Normal   3 Item Recall 3 objects recalled   Mini Cog Total Score 5              Signed Electronically by: Stephanie Allen MD    Answers submitted by the patient for this visit:  Patient Health Questionnaire (Submitted on 7/30/2025)  PHQ9 TOTAL SCORE: 0  Patient Health Questionnaire (G7) (Submitted on 7/30/2025)  YI 7 TOTAL SCORE: 0    "

## 2025-07-30 NOTE — PATIENT INSTRUCTIONS
NSAIDS like ibuprofen are contraindicated   Alcohol both will hurt your stomach and liver   You can take topical voltarel  gel , tyelenol ( up to 3000mg in 24 hrs ) for hand pain

## 2025-07-31 LAB
ALBUMIN SERPL BCG-MCNC: 4.8 G/DL (ref 3.5–5.2)
ALP SERPL-CCNC: 52 U/L (ref 40–150)
ALT SERPL W P-5'-P-CCNC: 30 U/L (ref 0–50)
ANION GAP SERPL CALCULATED.3IONS-SCNC: 14 MMOL/L (ref 7–15)
AST SERPL W P-5'-P-CCNC: 33 U/L (ref 0–45)
BILIRUB SERPL-MCNC: 0.5 MG/DL
BUN SERPL-MCNC: 11.4 MG/DL (ref 8–23)
CALCIUM SERPL-MCNC: 10 MG/DL (ref 8.8–10.4)
CHLORIDE SERPL-SCNC: 99 MMOL/L (ref 98–107)
CHOLEST SERPL-MCNC: 215 MG/DL
CREAT SERPL-MCNC: 0.63 MG/DL (ref 0.51–0.95)
EGFRCR SERPLBLD CKD-EPI 2021: >90 ML/MIN/1.73M2
FASTING STATUS PATIENT QL REPORTED: NO
FASTING STATUS PATIENT QL REPORTED: NO
GLUCOSE SERPL-MCNC: 105 MG/DL (ref 70–99)
HCO3 SERPL-SCNC: 26 MMOL/L (ref 22–29)
HDLC SERPL-MCNC: 83 MG/DL
LDLC SERPL CALC-MCNC: 107 MG/DL
NONHDLC SERPL-MCNC: 132 MG/DL
POTASSIUM SERPL-SCNC: 4.6 MMOL/L (ref 3.4–5.3)
PROT SERPL-MCNC: 7.2 G/DL (ref 6.4–8.3)
SODIUM SERPL-SCNC: 139 MMOL/L (ref 135–145)
TRIGL SERPL-MCNC: 124 MG/DL
URATE SERPL-MCNC: 5.6 MG/DL (ref 2.4–5.7)

## 2025-08-05 ENCOUNTER — HOSPITAL ENCOUNTER (OUTPATIENT)
Dept: BONE DENSITY | Facility: HOSPITAL | Age: 70
Discharge: HOME | End: 2025-08-05
Attending: INTERNAL MEDICINE
Payer: COMMERCIAL

## 2025-08-05 DIAGNOSIS — M81.0 OSTEOPOROSIS, UNSPECIFIED OSTEOPOROSIS TYPE, UNSPECIFIED PATHOLOGICAL FRACTURE PRESENCE: ICD-10-CM

## 2025-08-05 PROCEDURE — 77080 DXA BONE DENSITY AXIAL: CPT

## 2025-08-06 ENCOUNTER — ANCILLARY PROCEDURE (OUTPATIENT)
Dept: ULTRASOUND IMAGING | Facility: CLINIC | Age: 70
End: 2025-08-06
Attending: INTERNAL MEDICINE
Payer: COMMERCIAL

## 2025-08-06 DIAGNOSIS — K75.81 METABOLIC DYSFUNCTION-ASSOCIATED STEATOHEPATITIS (MASH): ICD-10-CM

## 2025-08-06 PROCEDURE — 76981 USE PARENCHYMA: CPT | Performed by: RADIOLOGY

## 2025-08-23 DIAGNOSIS — G25.0 BENIGN FAMILIAL TREMOR: ICD-10-CM

## 2025-08-23 DIAGNOSIS — Z76.0 ENCOUNTER FOR MEDICATION REFILL: ICD-10-CM

## 2025-08-24 RX ORDER — PRIMIDONE 50 MG/1
50 TABLET ORAL AT BEDTIME
Qty: 30 TABLET | Refills: 11 | Status: SHIPPED | OUTPATIENT
Start: 2025-08-24

## (undated) DEVICE — SU WND CLOSURE VLOC 180 ABS 2-0 12" GS-21 VLOCL0315

## (undated) DEVICE — DRAPE U SPLIT 74X120" 29440

## (undated) DEVICE — LUBRICANT INST ELECTROLUBE EL101

## (undated) DEVICE — DAVINCI HOT SHEARS TIP COVER  400180

## (undated) DEVICE — SYR 30ML LL W/O NDL 302832

## (undated) DEVICE — DRAPE SHEET TABLE COVER KC 42301*

## (undated) DEVICE — DAVINCI XI REDUCER 8-12MM 470381

## (undated) DEVICE — BLADE KNIFE SURG 11 371111

## (undated) DEVICE — SUTURE VICRYL+ 4-0 UNDYED PS-2 VCP496H

## (undated) DEVICE — SYR 50ML SLIP TIP W/O NDL 309654

## (undated) DEVICE — DAVINCI XI DRAPE ARM 470015

## (undated) DEVICE — CUSTOM PACK LAP CHOLE SBA5BLCHEA

## (undated) DEVICE — DAVINCI XI SEAL UNIVERSAL 5-12MM 470500

## (undated) DEVICE — SU PDS II 3-0 SH 27" Z316H

## (undated) DEVICE — DAVINCI XI OBTURATOR BLADELESS 8MM 470359

## (undated) DEVICE — SU DERMABOND ADVANCED .7ML DNX12

## (undated) DEVICE — GLOVE BIOGEL INDICATOR 7.5 LF 41675

## (undated) DEVICE — PAD POS XL 1X20X40IN PINK PIGAZZI

## (undated) DEVICE — TUBING SMOKE EVAC PNEUMOCLEAR HIGH FLOW 0620050250

## (undated) DEVICE — MARKER SURG SKIN 2 TIP STRL SPP99DT2AA

## (undated) DEVICE — PREP CHLORAPREP 26ML TINTED HI-LITE ORANGE 930815

## (undated) DEVICE — SU VICRYL+ 0 27 UR6 VLT VCP603H

## (undated) DEVICE — GOWN XLG DISP 9545

## (undated) DEVICE — SU VICRYL+ 3-0 27IN SH UND VCP416H

## (undated) DEVICE — SOL WATER IRRIG 1000ML BOTTLE 2F7114

## (undated) DEVICE — SU PDS II 2-0 SH 27" Z317H

## (undated) DEVICE — NDL INSUFFLATION 13GA 120MM C2201

## (undated) DEVICE — DRAPE SHEET REV FOLD 3/4 9349

## (undated) DEVICE — Device

## (undated) DEVICE — DEVICE CLOSURE SYNETURE POLYBUTESTER 12IN VLOCN0317

## (undated) DEVICE — DAVINCI XI DRAPE COLUMN 470341

## (undated) DEVICE — DECANTER VIAL 2006S

## (undated) DEVICE — GLOVE UNDER INDICATOR PI SZ 7.0 LF 41670

## (undated) DEVICE — ANTIFOG SOLUTION SEE SHARP 150M TROCAR SWABS 30978

## (undated) DEVICE — BINDER ABDOMINAL 3PANEL LG 45IN 08140345

## (undated) RX ORDER — ONDANSETRON 2 MG/ML
INJECTION INTRAMUSCULAR; INTRAVENOUS
Status: DISPENSED
Start: 2024-05-09

## (undated) RX ORDER — DEXAMETHASONE SODIUM PHOSPHATE 10 MG/ML
INJECTION, SOLUTION INTRAMUSCULAR; INTRAVENOUS
Status: DISPENSED
Start: 2024-05-09

## (undated) RX ORDER — FENTANYL CITRATE 50 UG/ML
INJECTION, SOLUTION INTRAMUSCULAR; INTRAVENOUS
Status: DISPENSED
Start: 2024-05-09

## (undated) RX ORDER — PROPOFOL 10 MG/ML
INJECTION, EMULSION INTRAVENOUS
Status: DISPENSED
Start: 2024-05-09

## (undated) RX ORDER — LIDOCAINE HYDROCHLORIDE AND EPINEPHRINE 10; 10 MG/ML; UG/ML
INJECTION, SOLUTION INFILTRATION; PERINEURAL
Status: DISPENSED
Start: 2024-05-09

## (undated) RX ORDER — LIDOCAINE HYDROCHLORIDE 10 MG/ML
INJECTION, SOLUTION EPIDURAL; INFILTRATION; INTRACAUDAL; PERINEURAL
Status: DISPENSED
Start: 2024-05-09